# Patient Record
Sex: MALE | Race: WHITE | NOT HISPANIC OR LATINO | Employment: OTHER | URBAN - NONMETROPOLITAN AREA
[De-identification: names, ages, dates, MRNs, and addresses within clinical notes are randomized per-mention and may not be internally consistent; named-entity substitution may affect disease eponyms.]

---

## 2019-09-12 ENCOUNTER — OFFICE VISIT (OUTPATIENT)
Dept: ENDOCRINOLOGY | Facility: CLINIC | Age: 74
End: 2019-09-12

## 2019-09-12 VITALS
HEIGHT: 73 IN | DIASTOLIC BLOOD PRESSURE: 74 MMHG | OXYGEN SATURATION: 95 % | SYSTOLIC BLOOD PRESSURE: 122 MMHG | HEART RATE: 74 BPM | WEIGHT: 244.7 LBS | BODY MASS INDEX: 32.43 KG/M2

## 2019-09-12 DIAGNOSIS — N40.0 PROSTATISM: ICD-10-CM

## 2019-09-12 DIAGNOSIS — G47.33 OBSTRUCTIVE SLEEP APNEA: ICD-10-CM

## 2019-09-12 DIAGNOSIS — E29.1 PRIMARY HYPOGONADISM IN MALE: Primary | ICD-10-CM

## 2019-09-12 DIAGNOSIS — R61 SWEATING PROFUSELY: ICD-10-CM

## 2019-09-12 DIAGNOSIS — E11.9 TYPE 2 DIABETES MELLITUS WITHOUT COMPLICATION, WITHOUT LONG-TERM CURRENT USE OF INSULIN (HCC): ICD-10-CM

## 2019-09-12 DIAGNOSIS — R97.20 ELEVATED PSA: ICD-10-CM

## 2019-09-12 PROBLEM — I10 ESSENTIAL HYPERTENSION: Status: ACTIVE | Noted: 2019-09-12

## 2019-09-12 PROCEDURE — 99204 OFFICE O/P NEW MOD 45 MIN: CPT | Performed by: INTERNAL MEDICINE

## 2019-09-12 RX ORDER — SITAGLIPTIN 100 MG/1
100 TABLET, FILM COATED ORAL DAILY
Refills: 0 | COMMUNITY
Start: 2019-07-23

## 2019-09-12 RX ORDER — GLIPIZIDE 5 MG/1
5 TABLET ORAL DAILY
Refills: 1 | COMMUNITY
Start: 2019-08-06

## 2019-09-12 RX ORDER — RIZATRIPTAN BENZOATE 10 MG/1
TABLET ORAL
Refills: 6 | COMMUNITY
Start: 2019-08-22

## 2019-09-12 RX ORDER — RIVAROXABAN 20 MG/1
20 TABLET, FILM COATED ORAL DAILY
Refills: 0 | COMMUNITY
Start: 2019-06-18

## 2019-09-12 RX ORDER — LISINOPRIL 5 MG/1
5 TABLET ORAL DAILY
Refills: 0 | COMMUNITY
Start: 2019-06-17

## 2019-09-12 RX ORDER — CALCIUM CITRATE/VITAMIN D3 200MG-6.25
1 TABLET ORAL 2 TIMES DAILY
Refills: 3 | COMMUNITY
Start: 2019-08-04

## 2019-09-12 RX ORDER — TRAVOPROST 0.004 %
DROPS OPHTHALMIC (EYE)
Refills: 99 | COMMUNITY
Start: 2019-06-06

## 2019-09-12 RX ORDER — VERAPAMIL HYDROCHLORIDE 240 MG/1
240 CAPSULE, EXTENDED RELEASE ORAL DAILY
Refills: 0 | COMMUNITY
Start: 2019-06-17

## 2019-09-12 RX ORDER — MECLIZINE HYDROCHLORIDE 25 MG/1
25 TABLET ORAL 3 TIMES DAILY PRN
COMMUNITY

## 2019-09-12 RX ORDER — SERTRALINE HYDROCHLORIDE 100 MG/1
100 TABLET, FILM COATED ORAL DAILY
Refills: 0 | COMMUNITY
Start: 2019-06-17

## 2019-09-12 RX ORDER — PANTOPRAZOLE SODIUM 40 MG/1
40 TABLET, DELAYED RELEASE ORAL DAILY
Refills: 0 | COMMUNITY
Start: 2019-07-23

## 2019-09-12 NOTE — PATIENT INSTRUCTIONS
You will be getting vials of testosterone    Each vial hold 200 mg of testosterone in 1 ml     What we suggest is 0.4 ml = 80 mg of testosterone every week.    What I suggest is to draw the testosterone with a 3 ml syringe with 18 G needle out of the vial and pass then content to an insulin syringe    On the insulin syringe you would draw to the 40 unit alcon and give that subcutaneously weekly

## 2019-09-12 NOTE — PROGRESS NOTES
Alex Lobo is a 74 y.o. male who presents for  evaluation of   Chief Complaint   Patient presents with   • low testosterone     checks sugar once daily       Referring provider     Primary Care Provider    Ulisses Junior MD    74-year-old male comes for consultation.    Reason: Male hypogonadism.    Duration, documented on June 18, 2019.    Quality/quantity total testosterone appropriately obtained at 7:12 AM on June 18, 2019 was 79 ng/dL.    Timing, constant, repeat assessment by me is consistent with low testosterone     Aggravating factors, patient has no history of brain trauma/infection.    He recalls that at the time of vasectomy he had testicular swelling that took weeks to resolve     Symptoms, patient had noticed progressive fatigue even persistent upon waking along with reduced libido and erectile dysfunction and depression.    No alleviating factors.    Aggravating factors include obesity and type 2 diabetes with chronic kidney disease stage III.          Past Medical History:   Diagnosis Date   • Essential hypertension 9/12/2019   • JITENDRA (obstructive sleep apnea) 9/12/2019   • Type 2 diabetes mellitus without complication, without long-term current use of insulin (CMS/[facility]) 9/12/2019     Family History   Problem Relation Age of Onset   • Diabetes Mother      Social History     Tobacco Use   • Smoking status: Former Smoker     Types: Pipe   • Smokeless tobacco: Never Used   • Tobacco comment: very seldom previous pipe smoker   Substance Use Topics   • Alcohol use: No     Frequency: Never   • Drug use: No         Current Outpatient Medications:   •  glipiZIDE (GLUCOTROL) 5 MG tablet, Take 5 mg by mouth Daily., Disp: , Rfl: 1  •  JANUVIA 100 MG tablet, Take 100 mg by mouth Daily., Disp: , Rfl: 0  •  lisinopril (PRINIVIL,ZESTRIL) 5 MG tablet, Take 5 mg by mouth Daily., Disp: , Rfl: 0  •  meclizine (ANTIVERT) 25 MG tablet, Take 25 mg by mouth 3 (Three) Times a Day As Needed for dizziness., Disp: , Rfl:  "  •  pantoprazole (PROTONIX) 40 MG EC tablet, Take 40 mg by mouth Daily., Disp: , Rfl: 0  •  rizatriptan (MAXALT) 10 MG tablet, TAKE 1 TABLET BY MOUTH AS NEEDED FOR HEADACHE, Disp: , Rfl: 6  •  sertraline (ZOLOFT) 100 MG tablet, Take 100 mg by mouth Daily., Disp: , Rfl: 0  •  TRAVATAN Z 0.004 % solution ophthalmic solution, INSTILL 1 DROP INTO EACH EYE AT BEDTIME, Disp: , Rfl: 99  •  TRUE METRIX BLOOD GLUCOSE TEST test strip, 1 each by Other route 2 (Two) Times a Day. to check glucose, Disp: , Rfl: 3  •  verapamil ER (VERELAN) 240 MG 24 hr capsule, Take 240 mg by mouth Daily., Disp: , Rfl: 0  •  XARELTO 20 MG tablet, Take 20 mg by mouth Daily., Disp: , Rfl: 0  •  Insulin Syringe 31G X 5/16\" 0.5 ML misc, To use as indicated once weekly, Disp: 50 each, Rfl: 11  •  Needle, Disp, 18G X 1\" misc, To use weekly, Disp: 4 each, Rfl: 11  •  Testosterone Cypionate (DEPOTESTOTERONE CYPIONATE) 200 MG/ML injection, 0.4 ml weekly = 80 mg weekly, Disp: 2 mL, Rfl: 5    Review of Systems    Review of Systems   Constitutional: Positive for fatigue and unexpected weight change. Negative for activity change, appetite change, chills, diaphoresis and fever.   HENT: Negative for congestion, dental problem, drooling, ear discharge, ear pain, facial swelling, mouth sores, postnasal drip, rhinorrhea, sinus pressure, sore throat, tinnitus, trouble swallowing and voice change.    Eyes: Negative for photophobia, pain, discharge, redness, itching and visual disturbance.   Respiratory: Positive for apnea. Negative for cough, choking, chest tightness, shortness of breath, wheezing and stridor.    Cardiovascular: Negative for chest pain, palpitations and leg swelling.   Gastrointestinal: Negative for abdominal distention, abdominal pain, constipation, diarrhea, nausea and vomiting.   Endocrine: Negative for cold intolerance, heat intolerance, polydipsia, polyphagia and polyuria.   Genitourinary: Negative for decreased urine volume, difficulty " "urinating, dysuria, flank pain, frequency, hematuria and urgency.   Musculoskeletal: Positive for arthralgias and myalgias. Negative for back pain, gait problem, joint swelling, neck pain and neck stiffness.   Skin: Negative for color change, pallor, rash and wound.   Allergic/Immunologic: Negative for immunocompromised state.   Neurological: Positive for weakness. Negative for dizziness, tremors, seizures, syncope, facial asymmetry, speech difficulty, light-headedness, numbness and headaches.   Hematological: Negative for adenopathy.   Psychiatric/Behavioral: Negative for agitation, behavioral problems, confusion, decreased concentration, dysphoric mood, hallucinations, self-injury, sleep disturbance and suicidal ideas. The patient is not nervous/anxious and is not hyperactive.         Objective:   /74   Pulse 74   Ht 185.4 cm (73\")   Wt 111 kg (244 lb 11.2 oz)   SpO2 95%   BMI 32.28 kg/m²     Physical Exam   Constitutional: He is oriented to person, place, and time. He appears well-developed and well-nourished. He is cooperative.   HENT:   Head: Normocephalic and atraumatic.   Right Ear: External ear normal.   Left Ear: External ear normal.   Nose: Nose normal.   Mouth/Throat: Oropharynx is clear and moist. No oropharyngeal exudate.   Eyes: Conjunctivae and EOM are normal. Pupils are equal, round, and reactive to light. No scleral icterus. Right eye exhibits normal extraocular motion. Left eye exhibits normal extraocular motion.   Neck: Neck supple. No JVD present. No muscular tenderness present. No tracheal deviation, no edema and no erythema present. No thyromegaly present.   Cardiovascular: Normal rate, regular rhythm, normal heart sounds and intact distal pulses. Exam reveals no gallop and no friction rub.   No murmur heard.  Pulmonary/Chest: Effort normal and breath sounds normal. No stridor. No respiratory distress. He has no decreased breath sounds. He has no wheezes. He has no rhonchi. He has no " rales. He exhibits no tenderness.   Abdominal: Soft. Bowel sounds are normal. He exhibits no distension and no mass. There is no hepatomegaly. There is no tenderness. There is no rebound and no guarding. No hernia.   Genitourinary:   Genitourinary Comments: Testicular size , 10 ml bilaterally    Musculoskeletal: Normal range of motion. He exhibits no edema, tenderness or deformity.   Lymphadenopathy:     He has no cervical adenopathy.   Neurological: He is alert and oriented to person, place, and time. He has normal reflexes. No cranial nerve deficit. He exhibits normal muscle tone. Coordination normal.   Skin: Skin is warm. No rash noted. No erythema. No pallor.   Psychiatric: He has a normal mood and affect. His behavior is normal. Judgment and thought content normal.   Nursing note and vitals reviewed.      Lab Review    Results for orders placed or performed in visit on 09/12/19   Comprehensive Metabolic Panel   Result Value Ref Range    Glucose 122 (H) 65 - 99 mg/dL    BUN 19 8 - 23 mg/dL    Creatinine 1.15 0.76 - 1.27 mg/dL    Sodium 140 136 - 145 mmol/L    Potassium 5.1 3.5 - 5.2 mmol/L    Chloride 100 98 - 107 mmol/L    CO2 28.1 22.0 - 29.0 mmol/L    Calcium 9.6 8.6 - 10.5 mg/dL    Total Protein 7.6 6.0 - 8.5 g/dL    Albumin 5.00 3.50 - 5.20 g/dL    ALT (SGPT) 51 (H) 1 - 41 U/L    AST (SGOT) 42 (H) 1 - 40 U/L    Alkaline Phosphatase 77 39 - 117 U/L    Total Bilirubin 0.4 0.2 - 1.2 mg/dL    eGFR Non African Amer 62 >60 mL/min/1.73    Globulin 2.6 gm/dL    A/G Ratio 1.9 g/dL    BUN/Creatinine Ratio 16.5 7.0 - 25.0    Anion Gap 11.9 5.0 - 15.0 mmol/L   FSH & LH   Result Value Ref Range    FSH 34.10 mIU/mL    LH 23.80 mIU/mL   Iron Profile   Result Value Ref Range    Iron 106 59 - 158 mcg/dL    Iron Saturation 26 20 - 50 %    Transferrin 270 200 - 360 mg/dL    TIBC 402 298 - 536 mcg/dL   Prolactin   Result Value Ref Range    Prolactin 6.85 4.04 - 15.20 ng/mL   PSA Total, Reflex To Free   Result Value Ref Range     PSA 2.0 0.0 - 4.0 ng/mL    Reflex Comment    Sex Horm Binding Globulin   Result Value Ref Range    Sex Hormone Binding Globulin 54.0 19.3 - 76.4 nmol/L   Testosterone   Result Value Ref Range    Testosterone, Total 94.40 (L) 193.00 - 740.00 ng/dL   Insulin-like Growth Factor   Result Value Ref Range    Insulin-Like Growth Factor-1 77 41 - 179 ng/mL   ACTH   Result Value Ref Range    ACTH 21.3 7.2 - 63.3 pg/mL   Cortisol - AM   Result Value Ref Range    Cortisol - AM 8.48 mcg/dL   TSH   Result Value Ref Range    TSH 2.310 0.270 - 4.200 uIU/mL   T4, Free   Result Value Ref Range    Free T4 1.07 0.93 - 1.70 ng/dL   CBC Auto Differential   Result Value Ref Range    WBC 6.28 3.40 - 10.80 10*3/mm3    RBC 4.83 4.14 - 5.80 10*6/mm3    Hemoglobin 15.5 13.0 - 17.7 g/dL    Hematocrit 47.6 37.5 - 51.0 %    MCV 98.6 (H) 79.0 - 97.0 fL    MCH 32.1 26.6 - 33.0 pg    MCHC 32.6 31.5 - 35.7 g/dL    RDW 13.2 12.3 - 15.4 %    RDW-SD 48.6 37.0 - 54.0 fl    MPV 11.2 6.0 - 12.0 fL    Platelets 169 140 - 450 10*3/mm3    Neutrophil % 58.1 42.7 - 76.0 %    Lymphocyte % 29.0 19.6 - 45.3 %    Monocyte % 9.4 5.0 - 12.0 %    Eosinophil % 2.2 0.3 - 6.2 %    Basophil % 0.8 0.0 - 1.5 %    Immature Grans % 0.5 0.0 - 0.5 %    Neutrophils, Absolute 3.65 1.70 - 7.00 10*3/mm3    Lymphocytes, Absolute 1.82 0.70 - 3.10 10*3/mm3    Monocytes, Absolute 0.59 0.10 - 0.90 10*3/mm3    Eosinophils, Absolute 0.14 0.00 - 0.40 10*3/mm3    Basophils, Absolute 0.05 0.00 - 0.20 10*3/mm3    Immature Grans, Absolute 0.03 0.00 - 0.05 10*3/mm3    nRBC 0.0 0.0 - 0.2 /100 WBC         Assessment/Plan       ICD-10-CM ICD-9-CM   1. Primary hypogonadism in male E29.1 257.2   2. Prostatism N40.0 600.90   3. Type 2 diabetes mellitus without complication, without long-term current use of insulin (CMS/[facility]) E11.9 250.00   4. Sweating profusely R61 780.8   5. Obstructive sleep apnea G47.33 327.23         I reviewed and summarized records from Ulisses Junior MD from current year   and I reviewed / ordered labs.   From review of records :    Assessment reveals testicular hypogonadism probably due to trauma at the time of vasectomy     I calculated Free Androgen Index and it is only 6 ( normal is above 30 )      Start  0.4 mL subcutaneously weekly of the 200 mg/mL vial equaling 80 mg/week.    Patient does have obstructive sleep apnea but is not wearing his CPAP at this point.  I will refer back to Dr. Lara who saw him in the past      Orders Placed This Encounter   Procedures   • Comprehensive Metabolic Panel   • FSH & LH   • Iron Profile   • Prolactin   • PSA Total, Reflex To Free   • Sex Horm Binding Globulin   • Testosterone   • Testosterone, Bioavailable (M)   • Insulin-like Growth Factor   • ACTH   • Cortisol - AM   • TSH   • T4, Free   • Metanephrines, Frac. Free, Plasma   • CBC Auto Differential   • Ambulatory Referral to Sleep Medicine     Referral Priority:   Routine     Referral Type:   Consultation     Referral Reason:   Specialty Services Required     Requested Specialty:   Sleep Medicine     Number of Visits Requested:   1   • CBC & Differential     Order Specific Question:   Manual Differential     Answer:   No         A copy of my note was sent to Ulisses Junior MD    Please see my above opinion and suggestions.

## 2019-09-13 ENCOUNTER — APPOINTMENT (OUTPATIENT)
Dept: LAB | Facility: HOSPITAL | Age: 74
End: 2019-09-13

## 2019-09-13 LAB
ALBUMIN SERPL-MCNC: 5 G/DL (ref 3.5–5.2)
ALBUMIN/GLOB SERPL: 1.9 G/DL
ALP SERPL-CCNC: 77 U/L (ref 39–117)
ALT SERPL W P-5'-P-CCNC: 51 U/L (ref 1–41)
ANION GAP SERPL CALCULATED.3IONS-SCNC: 11.9 MMOL/L (ref 5–15)
AST SERPL-CCNC: 42 U/L (ref 1–40)
BASOPHILS # BLD AUTO: 0.05 10*3/MM3 (ref 0–0.2)
BASOPHILS NFR BLD AUTO: 0.8 % (ref 0–1.5)
BILIRUB SERPL-MCNC: 0.4 MG/DL (ref 0.2–1.2)
BUN BLD-MCNC: 19 MG/DL (ref 8–23)
BUN/CREAT SERPL: 16.5 (ref 7–25)
CALCIUM SPEC-SCNC: 9.6 MG/DL (ref 8.6–10.5)
CHLORIDE SERPL-SCNC: 100 MMOL/L (ref 98–107)
CO2 SERPL-SCNC: 28.1 MMOL/L (ref 22–29)
CORTIS AM PEAK SERPL-MCNC: 8.48 MCG/DL
CREAT BLD-MCNC: 1.15 MG/DL (ref 0.76–1.27)
DEPRECATED RDW RBC AUTO: 48.6 FL (ref 37–54)
EOSINOPHIL # BLD AUTO: 0.14 10*3/MM3 (ref 0–0.4)
EOSINOPHIL NFR BLD AUTO: 2.2 % (ref 0.3–6.2)
ERYTHROCYTE [DISTWIDTH] IN BLOOD BY AUTOMATED COUNT: 13.2 % (ref 12.3–15.4)
FSH SERPL-ACNC: 34.1 MIU/ML
GFR SERPL CREATININE-BSD FRML MDRD: 62 ML/MIN/1.73
GLOBULIN UR ELPH-MCNC: 2.6 GM/DL
GLUCOSE BLD-MCNC: 122 MG/DL (ref 65–99)
HCT VFR BLD AUTO: 47.6 % (ref 37.5–51)
HGB BLD-MCNC: 15.5 G/DL (ref 13–17.7)
IMM GRANULOCYTES # BLD AUTO: 0.03 10*3/MM3 (ref 0–0.05)
IMM GRANULOCYTES NFR BLD AUTO: 0.5 % (ref 0–0.5)
IRON 24H UR-MRATE: 106 MCG/DL (ref 59–158)
IRON SATN MFR SERPL: 26 % (ref 20–50)
LH SERPL-ACNC: 23.8 MIU/ML
LYMPHOCYTES # BLD AUTO: 1.82 10*3/MM3 (ref 0.7–3.1)
LYMPHOCYTES NFR BLD AUTO: 29 % (ref 19.6–45.3)
MCH RBC QN AUTO: 32.1 PG (ref 26.6–33)
MCHC RBC AUTO-ENTMCNC: 32.6 G/DL (ref 31.5–35.7)
MCV RBC AUTO: 98.6 FL (ref 79–97)
MONOCYTES # BLD AUTO: 0.59 10*3/MM3 (ref 0.1–0.9)
MONOCYTES NFR BLD AUTO: 9.4 % (ref 5–12)
NEUTROPHILS # BLD AUTO: 3.65 10*3/MM3 (ref 1.7–7)
NEUTROPHILS NFR BLD AUTO: 58.1 % (ref 42.7–76)
NRBC BLD AUTO-RTO: 0 /100 WBC (ref 0–0.2)
PLATELET # BLD AUTO: 169 10*3/MM3 (ref 140–450)
PMV BLD AUTO: 11.2 FL (ref 6–12)
POTASSIUM BLD-SCNC: 5.1 MMOL/L (ref 3.5–5.2)
PROLACTIN SERPL-MCNC: 6.85 NG/ML (ref 4.04–15.2)
PROT SERPL-MCNC: 7.6 G/DL (ref 6–8.5)
RBC # BLD AUTO: 4.83 10*6/MM3 (ref 4.14–5.8)
SODIUM BLD-SCNC: 140 MMOL/L (ref 136–145)
T4 FREE SERPL-MCNC: 1.07 NG/DL (ref 0.93–1.7)
TESTOST SERPL-MCNC: 94.4 NG/DL (ref 193–740)
TIBC SERPL-MCNC: 402 MCG/DL (ref 298–536)
TRANSFERRIN SERPL-MCNC: 270 MG/DL (ref 200–360)
TSH SERPL DL<=0.05 MIU/L-ACNC: 2.31 UIU/ML (ref 0.27–4.2)
WBC NRBC COR # BLD: 6.28 10*3/MM3 (ref 3.4–10.8)

## 2019-09-13 PROCEDURE — 84270 ASSAY OF SEX HORMONE GLOBUL: CPT | Performed by: INTERNAL MEDICINE

## 2019-09-13 PROCEDURE — 84410 TESTOSTERONE BIOAVAILABLE: CPT | Performed by: INTERNAL MEDICINE

## 2019-09-13 PROCEDURE — 83002 ASSAY OF GONADOTROPIN (LH): CPT | Performed by: INTERNAL MEDICINE

## 2019-09-13 PROCEDURE — 80053 COMPREHEN METABOLIC PANEL: CPT | Performed by: INTERNAL MEDICINE

## 2019-09-13 PROCEDURE — 84466 ASSAY OF TRANSFERRIN: CPT | Performed by: INTERNAL MEDICINE

## 2019-09-13 PROCEDURE — 84403 ASSAY OF TOTAL TESTOSTERONE: CPT | Performed by: INTERNAL MEDICINE

## 2019-09-13 PROCEDURE — 84146 ASSAY OF PROLACTIN: CPT | Performed by: INTERNAL MEDICINE

## 2019-09-13 PROCEDURE — 84305 ASSAY OF SOMATOMEDIN: CPT | Performed by: INTERNAL MEDICINE

## 2019-09-13 PROCEDURE — 85025 COMPLETE CBC W/AUTO DIFF WBC: CPT | Performed by: INTERNAL MEDICINE

## 2019-09-13 PROCEDURE — 84439 ASSAY OF FREE THYROXINE: CPT | Performed by: INTERNAL MEDICINE

## 2019-09-13 PROCEDURE — 84443 ASSAY THYROID STIM HORMONE: CPT | Performed by: INTERNAL MEDICINE

## 2019-09-13 PROCEDURE — 82533 TOTAL CORTISOL: CPT | Performed by: INTERNAL MEDICINE

## 2019-09-13 PROCEDURE — 83540 ASSAY OF IRON: CPT | Performed by: INTERNAL MEDICINE

## 2019-09-13 PROCEDURE — 84153 ASSAY OF PSA TOTAL: CPT | Performed by: INTERNAL MEDICINE

## 2019-09-13 PROCEDURE — 83001 ASSAY OF GONADOTROPIN (FSH): CPT | Performed by: INTERNAL MEDICINE

## 2019-09-13 PROCEDURE — 83835 ASSAY OF METANEPHRINES: CPT | Performed by: INTERNAL MEDICINE

## 2019-09-13 PROCEDURE — 82024 ASSAY OF ACTH: CPT | Performed by: INTERNAL MEDICINE

## 2019-09-14 LAB
PSA SERPL-MCNC: 2 NG/ML (ref 0–4)
REFLEX: NORMAL
SHBG SERPL-SCNC: 54 NMOL/L (ref 19.3–76.4)

## 2019-09-16 LAB
ACTH PLAS-MCNC: 21.3 PG/ML (ref 7.2–63.3)
IGF-I SERPL-MCNC: 77 NG/ML (ref 41–179)

## 2019-09-17 RX ORDER — TESTOSTERONE CYPIONATE 200 MG/ML
INJECTION, SOLUTION INTRAMUSCULAR
Qty: 2 ML | Refills: 5 | Status: SHIPPED | OUTPATIENT
Start: 2019-09-17

## 2019-09-17 RX ORDER — NAPROXEN SODIUM 220 MG
TABLET ORAL
Qty: 50 EACH | Refills: 11 | Status: SHIPPED | OUTPATIENT
Start: 2019-09-17

## 2019-09-19 LAB
BIOAVAILABLE TESTOSTERONE, %: 24.5 %
BIOAVAILABLE TESTOSTERONE, S: 32 NG/DL
TESTOST SERPL-MCNC: 129 NG/DL

## 2019-09-22 LAB
METANEPHRINE, PL: NORMAL PG/ML (ref 0–62)
NORMETANEPHRINE, PL: 12 PG/ML (ref 0–145)

## 2019-12-11 ENCOUNTER — APPOINTMENT (OUTPATIENT)
Dept: LAB | Facility: HOSPITAL | Age: 74
End: 2019-12-11

## 2019-12-11 LAB
ALBUMIN SERPL-MCNC: 4.3 G/DL (ref 3.5–5.2)
ALBUMIN/GLOB SERPL: 1.2 G/DL
ALP SERPL-CCNC: 67 U/L (ref 39–117)
ALT SERPL W P-5'-P-CCNC: 34 U/L (ref 1–41)
ANION GAP SERPL CALCULATED.3IONS-SCNC: 13.4 MMOL/L (ref 5–15)
AST SERPL-CCNC: 30 U/L (ref 1–40)
BILIRUB SERPL-MCNC: 0.5 MG/DL (ref 0.2–1.2)
BUN BLD-MCNC: 17 MG/DL (ref 8–23)
BUN/CREAT SERPL: 14.3 (ref 7–25)
CALCIUM SPEC-SCNC: 9.4 MG/DL (ref 8.6–10.5)
CHLORIDE SERPL-SCNC: 102 MMOL/L (ref 98–107)
CO2 SERPL-SCNC: 25.6 MMOL/L (ref 22–29)
CREAT BLD-MCNC: 1.19 MG/DL (ref 0.76–1.27)
GFR SERPL CREATININE-BSD FRML MDRD: 60 ML/MIN/1.73
GLOBULIN UR ELPH-MCNC: 3.7 GM/DL
GLUCOSE BLD-MCNC: 137 MG/DL (ref 65–99)
HCT VFR BLD AUTO: 46.4 % (ref 37.5–51)
POTASSIUM BLD-SCNC: 5 MMOL/L (ref 3.5–5.2)
PROT SERPL-MCNC: 8 G/DL (ref 6–8.5)
SODIUM BLD-SCNC: 141 MMOL/L (ref 136–145)
TESTOST SERPL-MCNC: 492 NG/DL (ref 193–740)

## 2019-12-11 PROCEDURE — 84154 ASSAY OF PSA FREE: CPT | Performed by: INTERNAL MEDICINE

## 2019-12-11 PROCEDURE — 84403 ASSAY OF TOTAL TESTOSTERONE: CPT | Performed by: INTERNAL MEDICINE

## 2019-12-11 PROCEDURE — 84153 ASSAY OF PSA TOTAL: CPT | Performed by: INTERNAL MEDICINE

## 2019-12-11 PROCEDURE — 85014 HEMATOCRIT: CPT | Performed by: INTERNAL MEDICINE

## 2019-12-11 PROCEDURE — 80053 COMPREHEN METABOLIC PANEL: CPT | Performed by: INTERNAL MEDICINE

## 2019-12-12 LAB
PSA FREE MFR SERPL: 18.2 %
PSA FREE SERPL-MCNC: 1 NG/ML
PSA SERPL-MCNC: 5.5 NG/ML (ref 0–4)
REFLEX: ABNORMAL

## 2019-12-12 RX ORDER — NAPROXEN SODIUM 220 MG
TABLET ORAL
Qty: 50 EACH | Refills: 11 | OUTPATIENT
Start: 2019-12-12

## 2019-12-12 RX ORDER — TESTOSTERONE CYPIONATE 200 MG/ML
INJECTION, SOLUTION INTRAMUSCULAR
Qty: 2 ML | Refills: 5 | OUTPATIENT
Start: 2019-12-12

## 2019-12-16 ENCOUNTER — TELEPHONE (OUTPATIENT)
Dept: FAMILY MEDICINE CLINIC | Facility: CLINIC | Age: 74
End: 2019-12-16

## 2019-12-16 NOTE — TELEPHONE ENCOUNTER
Pt spoke with Dr. Brooks on Friday and he understood him to say he did not need his 3 mo follow up on 12/18/2019 with [facility]. They are wanting a call back to see if they need to still come in or not. Thanks!    On 12/13/19 he told them to schedule an appointment 6 weeks from 12/13/19 so January end. Thanks  I snet a [employer] message this morning with this same info.   Thanks, just call them and change the appointment from 12/18/19 to 6 weeks from 12/13/19

## 2019-12-16 NOTE — TELEPHONE ENCOUNTER
Pt spoke with Dr. Brooks on Friday and he understood him to say he did not need his 3 mo follow up on 12/18/2019 with Ilir. They are wanting a call back to see if they need to still come in or not. Thanks!

## 2019-12-18 ENCOUNTER — OFFICE VISIT (OUTPATIENT)
Dept: ENDOCRINOLOGY | Facility: CLINIC | Age: 74
End: 2019-12-18

## 2019-12-18 VITALS
SYSTOLIC BLOOD PRESSURE: 142 MMHG | OXYGEN SATURATION: 98 % | BODY MASS INDEX: 33.11 KG/M2 | WEIGHT: 249.8 LBS | DIASTOLIC BLOOD PRESSURE: 70 MMHG | HEIGHT: 73 IN | HEART RATE: 72 BPM

## 2019-12-18 DIAGNOSIS — R97.20 ELEVATED PSA: ICD-10-CM

## 2019-12-18 DIAGNOSIS — E29.1 MALE HYPOGONADISM: Primary | ICD-10-CM

## 2019-12-18 PROCEDURE — 99214 OFFICE O/P EST MOD 30 MIN: CPT | Performed by: NURSE PRACTITIONER

## 2019-12-18 NOTE — PROGRESS NOTES
Subjective    Alex Lobo is a 74 y.o. male. he is here today for follow-up.    History of Present Illness       Primary Care Provider     Ulisses Junior MD     74-year-old male comes for follow up      Reason: Male hypogonadism.     Duration, documented on June 18, 2019.     Quality/quantity total testosterone appropriately obtained at 7:12 AM on June 18, 2019 was 79 ng/dL.     Timing, constant, repeat assessment by me is consistent with low testosterone      Aggravating factors, patient has no history of brain trauma/infection.     He recalls that at the time of vasectomy he had testicular swelling that took weeks to resolve      Symptoms,now resolved states has energy       alleviating factors.--testosterone      Aggravating factors include obesity and type 2 diabetes with chronic kidney disease stage III.    PSA elevated since starting testosterone               Component      Latest Ref Rng & Units 9/13/2019 9/13/2019           8:12 AM  8:12 AM   Testosterone, Total      193.00 - 740.00 ng/dL 94.40 (L) 129 (L)   Testosterone, Bioavailable      ng/dL  32 (L)   Testosterone, % Bioavailable      %  24.5   PSA      0.0 - 4.0 ng/mL 2.0    Reflex       Comment    Sex Hormone Binding Globulin      19.3 - 76.4 nmol/L 54.0        Component      Latest Ref Rng & Units 12/11/2019   PSA      0.0 - 4.0 ng/mL 5.5 (H)   Reflex       Comment   PSA, Free      N/A ng/mL 1.00   PSA, Free %      % 18.2   Testosterone, Total      193.00 - 740.00 ng/dL 492.00       The following portions of the patient's history were reviewed and updated as appropriate:   Past Medical History:   Diagnosis Date   • Essential hypertension 9/12/2019   • JITENDRA (obstructive sleep apnea) 9/12/2019   • Type 2 diabetes mellitus without complication, without long-term current use of insulin (CMS/[facility]) 9/12/2019     No past surgical history on file.  Family History   Problem Relation Age of Onset   • Diabetes Mother        Current Outpatient Medications  "  Medication Sig Dispense Refill   • glipiZIDE (GLUCOTROL) 5 MG tablet Take 5 mg by mouth Daily.  1   • Insulin Syringe 31G X 5/16\" 0.5 ML misc To use as indicated once weekly 50 each 11   • JANUVIA 100 MG tablet Take 100 mg by mouth Daily.  0   • lisinopril (PRINIVIL,ZESTRIL) 5 MG tablet Take 5 mg by mouth Daily.  0   • meclizine (ANTIVERT) 25 MG tablet Take 25 mg by mouth 3 (Three) Times a Day As Needed for dizziness.     • Needle, Disp, 18G X 1\" misc To use weekly 4 each 11   • pantoprazole (PROTONIX) 40 MG EC tablet Take 40 mg by mouth Daily.  0   • rizatriptan (MAXALT) 10 MG tablet TAKE 1 TABLET BY MOUTH AS NEEDED FOR HEADACHE  6   • sertraline (ZOLOFT) 100 MG tablet Take 100 mg by mouth Daily.  0   • Testosterone Cypionate (DEPOTESTOTERONE CYPIONATE) 200 MG/ML injection 0.4 ml weekly = 80 mg weekly 2 mL 5   • TRAVATAN Z 0.004 % solution ophthalmic solution INSTILL 1 DROP INTO EACH EYE AT BEDTIME  99   • TRUE METRIX BLOOD GLUCOSE TEST test strip 1 each by Other route 2 (Two) Times a Day. to check glucose  3   • verapamil ER (VERELAN) 240 MG 24 hr capsule Take 240 mg by mouth Daily.  0   • XARELTO 20 MG tablet Take 20 mg by mouth Daily.  0     No current facility-administered medications for this visit.      Allergies   Allergen Reactions   • Ticlid [Ticlopidine] Hives     swelling     Social History     Socioeconomic History   • Marital status:      Spouse name: Not on file   • Number of children: Not on file   • Years of education: Not on file   • Highest education level: Not on file   Tobacco Use   • Smoking status: Former Smoker     Types: Pipe   • Smokeless tobacco: Never Used   • Tobacco comment: very seldom previous pipe smoker   Substance and Sexual Activity   • Alcohol use: No     Frequency: Never   • Drug use: No   • Sexual activity: Defer       Review of Systems  Review of Systems   Constitutional: Negative for activity change, appetite change, diaphoresis and fatigue.   HENT: Negative for " "facial swelling, sneezing, sore throat, tinnitus, trouble swallowing and voice change.    Eyes: Negative for photophobia, pain, discharge, redness, itching and visual disturbance.   Respiratory: Negative for apnea, cough, choking, chest tightness and shortness of breath.    Cardiovascular: Negative for chest pain, palpitations and leg swelling.   Gastrointestinal: Negative for abdominal distention, abdominal pain, constipation, diarrhea, nausea and vomiting.   Endocrine: Negative for cold intolerance, heat intolerance, polydipsia, polyphagia and polyuria.   Genitourinary: Negative for difficulty urinating, dysuria, frequency, hematuria and urgency.   Musculoskeletal: Negative for arthralgias, back pain, gait problem, joint swelling, myalgias, neck pain and neck stiffness.   Skin: Negative for color change, pallor, rash and wound.   Neurological: Negative for dizziness, tremors, weakness, light-headedness, numbness and headaches.   Hematological: Negative for adenopathy. Does not bruise/bleed easily.   Psychiatric/Behavioral: Negative for behavioral problems, confusion and sleep disturbance.        Objective    /70   Pulse 72   Ht 185.4 cm (73\")   Wt 113 kg (249 lb 12.8 oz)   SpO2 98%   BMI 32.96 kg/m²   Physical Exam   Constitutional: He is oriented to person, place, and time. He appears well-developed and well-nourished. No distress.   HENT:   Head: Normocephalic and atraumatic.   Right Ear: External ear normal.   Left Ear: External ear normal.   Nose: Nose normal.   Eyes: Pupils are equal, round, and reactive to light. Conjunctivae and EOM are normal.   Neck: Normal range of motion. Neck supple. No tracheal deviation present. No thyromegaly present.   Cardiovascular: Normal rate, regular rhythm and normal heart sounds.   No murmur heard.  Pulmonary/Chest: Effort normal and breath sounds normal. No respiratory distress. He has no wheezes.   Abdominal: Soft. Bowel sounds are normal. There is no " "tenderness. There is no rebound and no guarding.   Musculoskeletal: Normal range of motion. He exhibits no edema, tenderness or deformity.   Neurological: He is alert and oriented to person, place, and time. No cranial nerve deficit.   Skin: Skin is warm and dry. No rash noted.   Psychiatric: He has a normal mood and affect. His behavior is normal. Judgment and thought content normal.       Lab Review  Glucose (mg/dL)   Date Value   12/11/2019 137 (H)   09/13/2019 122 (H)   07/02/2014 115 (H)     Sodium (mmol/L)   Date Value   12/11/2019 141   09/13/2019 140   07/02/2014 143     Potassium (mmol/L)   Date Value   12/11/2019 5.0   09/13/2019 5.1   07/02/2014 4.6     Chloride (mmol/L)   Date Value   12/11/2019 102   09/13/2019 100   07/02/2014 99     CO2 (mmol/L)   Date Value   12/11/2019 25.6   09/13/2019 28.1   07/02/2014 28     BUN (mg/dL)   Date Value   12/11/2019 17   09/13/2019 19   07/02/2014 22 (H)     Creatinine (mg/dL)   Date Value   12/11/2019 1.19   09/13/2019 1.15   07/02/2014 1.08       Assessment/Plan      1. Male hypogonadism    2. Elevated PSA    .    Medications prescribed:  Outpatient Encounter Medications as of 12/18/2019   Medication Sig Dispense Refill   • glipiZIDE (GLUCOTROL) 5 MG tablet Take 5 mg by mouth Daily.  1   • Insulin Syringe 31G X 5/16\" 0.5 ML misc To use as indicated once weekly 50 each 11   • JANUVIA 100 MG tablet Take 100 mg by mouth Daily.  0   • lisinopril (PRINIVIL,ZESTRIL) 5 MG tablet Take 5 mg by mouth Daily.  0   • meclizine (ANTIVERT) 25 MG tablet Take 25 mg by mouth 3 (Three) Times a Day As Needed for dizziness.     • Needle, Disp, 18G X 1\" misc To use weekly 4 each 11   • pantoprazole (PROTONIX) 40 MG EC tablet Take 40 mg by mouth Daily.  0   • rizatriptan (MAXALT) 10 MG tablet TAKE 1 TABLET BY MOUTH AS NEEDED FOR HEADACHE  6   • sertraline (ZOLOFT) 100 MG tablet Take 100 mg by mouth Daily.  0   • Testosterone Cypionate (DEPOTESTOTERONE CYPIONATE) 200 MG/ML injection 0.4 " ml weekly = 80 mg weekly 2 mL 5   • TRAVATAN Z 0.004 % solution ophthalmic solution INSTILL 1 DROP INTO EACH EYE AT BEDTIME  99   • TRUE METRIX BLOOD GLUCOSE TEST test strip 1 each by Other route 2 (Two) Times a Day. to check glucose  3   • verapamil ER (VERELAN) 240 MG 24 hr capsule Take 240 mg by mouth Daily.  0   • XARELTO 20 MG tablet Take 20 mg by mouth Daily.  0     No facility-administered encounter medications on file as of 12/18/2019.        Orders placed during this encounter include:  No orders of the defined types were placed in this encounter.       Assessment reveals testicular hypogonadism probably due to trauma at the time of vasectomy      I calculated Free Androgen Index and it is only 6 ( normal is above 30 )        Start  0.4 mL subcutaneously weekly of the 200 mg/mL vial equaling 80 mg/week.     Patient does have obstructive sleep apnea but is not wearing his CPAP at this point.  I will refer back to Dr. Lara who saw him in the past     Component      Latest Ref Rng & Units 9/13/2019 9/13/2019           8:12 AM  8:12 AM   Testosterone, Total      193.00 - 740.00 ng/dL 94.40 (L) 129 (L)   Testosterone, Bioavailable      ng/dL  32 (L)   Testosterone, % Bioavailable      %  24.5   PSA      0.0 - 4.0 ng/mL 2.0    Reflex       Comment    Sex Hormone Binding Globulin      19.3 - 76.4 nmol/L 54.0                -----------------------------------------------------------------------------------------        Component      Latest Ref Rng & Units 12/11/2019   PSA      0.0 - 4.0 ng/mL 5.5 (H)   Reflex       Comment   Testosterone, Total      193.00 - 740.00 ng/dL 492.00         Patient has an appointment with urology on Friday 12-       with Dr.Franke in [place]       Stop the testosterone until after seeing urologist     4. Follow-up: Return in about 6 weeks (around 1/29/2020) for Recheck.

## 2019-12-20 ENCOUNTER — OFFICE VISIT (OUTPATIENT)
Dept: UROLOGY | Age: 74
End: 2019-12-20
Payer: MEDICARE

## 2019-12-20 VITALS
HEART RATE: 75 BPM | WEIGHT: 249 LBS | HEIGHT: 73 IN | DIASTOLIC BLOOD PRESSURE: 65 MMHG | BODY MASS INDEX: 33 KG/M2 | SYSTOLIC BLOOD PRESSURE: 136 MMHG | TEMPERATURE: 97 F

## 2019-12-20 DIAGNOSIS — R39.89 ABNORMAL PROSTATE EXAM: ICD-10-CM

## 2019-12-20 DIAGNOSIS — R97.20 ELEVATED PSA: Primary | ICD-10-CM

## 2019-12-20 LAB
BACTERIA URINE, POC: NORMAL
BILIRUBIN URINE: 0 MG/DL
BLOOD, URINE: POSITIVE
CASTS URINE, POC: NORMAL
CLARITY: CLEAR
COLOR: YELLOW
CRYSTALS URINE, POC: NORMAL
EPI CELLS URINE, POC: NORMAL
GLUCOSE URINE: NORMAL
KETONES, URINE: NEGATIVE
LEUKOCYTE EST, POC: NORMAL
NITRITE, URINE: NEGATIVE
PH UA: 5.5 (ref 4.5–8)
PROTEIN UA: NEGATIVE
RBC URINE, POC: NORMAL
SPECIFIC GRAVITY UA: 1.03 (ref 1–1.03)
UROBILINOGEN, URINE: NORMAL
WBC URINE, POC: NORMAL
YEAST URINE, POC: NORMAL

## 2019-12-20 PROCEDURE — 3017F COLORECTAL CA SCREEN DOC REV: CPT | Performed by: NURSE PRACTITIONER

## 2019-12-20 PROCEDURE — G8484 FLU IMMUNIZE NO ADMIN: HCPCS | Performed by: NURSE PRACTITIONER

## 2019-12-20 PROCEDURE — G8427 DOCREV CUR MEDS BY ELIG CLIN: HCPCS | Performed by: NURSE PRACTITIONER

## 2019-12-20 PROCEDURE — 1036F TOBACCO NON-USER: CPT | Performed by: NURSE PRACTITIONER

## 2019-12-20 PROCEDURE — 81001 URINALYSIS AUTO W/SCOPE: CPT | Performed by: NURSE PRACTITIONER

## 2019-12-20 PROCEDURE — 4040F PNEUMOC VAC/ADMIN/RCVD: CPT | Performed by: NURSE PRACTITIONER

## 2019-12-20 PROCEDURE — 1123F ACP DISCUSS/DSCN MKR DOCD: CPT | Performed by: NURSE PRACTITIONER

## 2019-12-20 PROCEDURE — 99204 OFFICE O/P NEW MOD 45 MIN: CPT | Performed by: NURSE PRACTITIONER

## 2019-12-20 PROCEDURE — G8417 CALC BMI ABV UP PARAM F/U: HCPCS | Performed by: NURSE PRACTITIONER

## 2019-12-20 RX ORDER — BENZONATATE 100 MG/1
100 CAPSULE ORAL 3 TIMES DAILY PRN
COMMUNITY
End: 2020-02-07

## 2019-12-20 RX ORDER — GLIPIZIDE 5 MG/1
5 TABLET ORAL DAILY
COMMUNITY
Start: 2019-08-06

## 2019-12-20 RX ORDER — MECLIZINE HYDROCHLORIDE 25 MG/1
25 TABLET ORAL 3 TIMES DAILY PRN
COMMUNITY

## 2019-12-20 RX ORDER — LISINOPRIL 5 MG/1
TABLET ORAL DAILY
COMMUNITY
Start: 2019-06-17 | End: 2022-05-12 | Stop reason: ALTCHOICE

## 2019-12-20 RX ORDER — VERAPAMIL HYDROCHLORIDE 240 MG/1
CAPSULE, EXTENDED RELEASE ORAL DAILY
COMMUNITY
Start: 2019-06-17 | End: 2022-06-24

## 2019-12-20 RX ORDER — RIZATRIPTAN BENZOATE 10 MG/1
TABLET ORAL PRN
COMMUNITY
Start: 2019-08-22

## 2019-12-20 RX ORDER — DUTASTERIDE 0.5 MG/1
0.5 CAPSULE, LIQUID FILLED ORAL DAILY
Qty: 30 CAPSULE | Refills: 3 | Status: SHIPPED | OUTPATIENT
Start: 2019-12-20 | End: 2021-03-17

## 2019-12-20 RX ORDER — SERTRALINE HYDROCHLORIDE 100 MG/1
TABLET, FILM COATED ORAL DAILY
COMMUNITY
Start: 2019-06-17

## 2019-12-20 RX ORDER — PANTOPRAZOLE SODIUM 40 MG/1
40 GRANULE, DELAYED RELEASE ORAL
COMMUNITY
End: 2020-09-11

## 2019-12-20 RX ORDER — TESTOSTERONE CYPIONATE 200 MG/ML
INJECTION INTRAMUSCULAR
COMMUNITY
Start: 2019-09-17 | End: 2020-04-13

## 2019-12-20 RX ORDER — CIPROFLOXACIN 500 MG/1
500 TABLET, FILM COATED ORAL 2 TIMES DAILY
Qty: 8 TABLET | Refills: 0 | Status: SHIPPED | OUTPATIENT
Start: 2020-01-19 | End: 2020-01-23

## 2019-12-20 RX ORDER — CETIRIZINE HYDROCHLORIDE 10 MG/1
TABLET ORAL
COMMUNITY

## 2019-12-27 ENCOUNTER — TELEPHONE (OUTPATIENT)
Dept: FAMILY MEDICINE CLINIC | Facility: CLINIC | Age: 74
End: 2019-12-27

## 2020-01-20 ENCOUNTER — PROCEDURE VISIT (OUTPATIENT)
Dept: UROLOGY | Age: 75
End: 2020-01-20
Payer: MEDICARE

## 2020-01-20 VITALS — BODY MASS INDEX: 32.85 KG/M2 | WEIGHT: 249 LBS | TEMPERATURE: 97.3 F

## 2020-01-20 PROCEDURE — 96372 THER/PROPH/DIAG INJ SC/IM: CPT | Performed by: UROLOGY

## 2020-01-20 PROCEDURE — 76872 US TRANSRECTAL: CPT | Performed by: UROLOGY

## 2020-01-20 PROCEDURE — 55700 PR BIOPSY OF PROSTATE,NEEDLE/PUNCH: CPT | Performed by: UROLOGY

## 2020-01-20 RX ORDER — GENTAMICIN SULFATE 40 MG/ML
80 INJECTION, SOLUTION INTRAMUSCULAR; INTRAVENOUS ONCE
Status: COMPLETED | OUTPATIENT
Start: 2020-01-20 | End: 2020-01-20

## 2020-01-20 RX ADMIN — GENTAMICIN SULFATE 80 MG: 40 INJECTION, SOLUTION INTRAMUSCULAR; INTRAVENOUS at 08:04

## 2020-01-20 NOTE — LETTER
[id number] [facility]  [place] [address]  [address] [place] [id number]  Phone: [phone]  Fax: [phone]    Chelsi Iraheta MD        January 20, 2020     Daniel Sanchez  [facility] [address] [address] [facility]      Patient: Ramu Marsh   MR Number: [id number]   YOB: 1945   Date of Visit: 1/20/2020       Dear Provider: Thank you for referring Leonor Lauren to me for evaluation. Below are the relevant portions of my assessment and plan of care. If you have questions, please do not hesitate to call me. I look forward to following Brenden Carroll along with you.     Sincerely,        Chelsi Iraheta MD

## 2020-01-24 ENCOUNTER — TELEPHONE (OUTPATIENT)
Dept: UROLOGY | Age: 75
End: 2020-01-24

## 2020-01-29 ENCOUNTER — HOSPITAL ENCOUNTER (OUTPATIENT)
Dept: NUCLEAR MEDICINE | Age: 75
Discharge: HOME | End: 2020-01-31
Payer: MEDICARE

## 2020-01-29 ENCOUNTER — HOSPITAL ENCOUNTER (OUTPATIENT)
Dept: GENERAL RADIOLOGY | Age: 75
Discharge: HOME | End: 2020-01-29
Payer: MEDICARE

## 2020-01-29 ENCOUNTER — TELEPHONE (OUTPATIENT)
Dept: UROLOGY | Age: 75
End: 2020-01-29

## 2020-01-29 ENCOUNTER — HOSPITAL ENCOUNTER (OUTPATIENT)
Dept: CT IMAGING | Age: 75
Discharge: HOME | End: 2020-01-29
Payer: MEDICARE

## 2020-01-29 ENCOUNTER — HOSPITAL ENCOUNTER (OUTPATIENT)
Dept: LAB | Age: 75
Discharge: HOME | End: 2020-01-29
Payer: MEDICARE

## 2020-01-29 DIAGNOSIS — C61 PROSTATE CANCER (HCC): ICD-10-CM

## 2020-01-29 LAB
ALBUMIN SERPL-MCNC: 4.4 G/DL (ref 3.5–5.2)
ALP BLD-CCNC: 74 U/L (ref 40–130)
ALT SERPL-CCNC: 25 U/L (ref 5–41)
ANION GAP SERPL CALCULATED.3IONS-SCNC: 12 MMOL/L (ref 7–19)
AST SERPL-CCNC: 25 U/L (ref 5–40)
BILIRUB SERPL-MCNC: 0.3 MG/DL (ref 0.2–1.2)
BUN BLDV-MCNC: 19 MG/DL (ref 8–23)
CALCIUM SERPL-MCNC: 9.1 MG/DL (ref 8.8–10.2)
CHLORIDE BLD-SCNC: 103 MMOL/L (ref 98–111)
CO2: 27 MMOL/L (ref 22–29)
CREAT SERPL-MCNC: 1.1 MG/DL (ref 0.5–1.2)
GFR NON-AFRICAN AMERICAN: 50
GFR NON-AFRICAN AMERICAN: >60
GLUCOSE BLD-MCNC: 117 MG/DL (ref 74–109)
PERFORMED ON: ABNORMAL
POC CREATININE: 1.4 MG/DL (ref 0.3–1.3)
POC SAMPLE TYPE: ABNORMAL
POTASSIUM SERPL-SCNC: 4.9 MMOL/L (ref 3.5–5)
SODIUM BLD-SCNC: 142 MMOL/L (ref 136–145)
TOTAL PROTEIN: 7.8 G/DL (ref 6.6–8.7)

## 2020-01-29 PROCEDURE — A9561 TC99M OXIDRONATE: HCPCS | Performed by: UROLOGY

## 2020-01-29 PROCEDURE — 6360000004 HC RX CONTRAST MEDICATION: Performed by: UROLOGY

## 2020-01-29 PROCEDURE — 71046 X-RAY EXAM CHEST 2 VIEWS: CPT

## 2020-01-29 PROCEDURE — 3430000000 HC RX DIAGNOSTIC RADIOPHARMACEUTICAL: Performed by: UROLOGY

## 2020-01-29 PROCEDURE — 74177 CT ABD & PELVIS W/CONTRAST: CPT

## 2020-01-29 PROCEDURE — 78306 BONE IMAGING WHOLE BODY: CPT

## 2020-01-29 PROCEDURE — 3430000000 HC RX DIAGNOSTIC RADIOPHARMACEUTICAL

## 2020-01-29 PROCEDURE — A9561 TC99M OXIDRONATE: HCPCS

## 2020-01-29 PROCEDURE — 82565 ASSAY OF CREATININE: CPT

## 2020-01-29 RX ADMIN — IOPAMIDOL 90 ML: 755 INJECTION, SOLUTION INTRAVENOUS at 09:29

## 2020-01-29 RX ADMIN — TECHNETIUM TC 99M OXIDRONATE 19.8 MILLICURIE: 3.15 INJECTION, POWDER, LYOPHILIZED, FOR SOLUTION INTRAVENOUS at 12:43

## 2020-01-29 NOTE — TELEPHONE ENCOUNTER
Yessica Rodriguez requests that office return their call. The best time to reach him is Anytime. Patient has a cancer consult that he is wanting to get a different time scheduled.

## 2020-01-31 NOTE — TELEPHONE ENCOUNTER
I spoke with patient and let them know that Fridays were the only day that he did cancer consultations, he stated that he could make his scheduled day work.

## 2020-02-07 ENCOUNTER — OFFICE VISIT (OUTPATIENT)
Dept: UROLOGY | Age: 75
End: 2020-02-07
Payer: MEDICARE

## 2020-02-07 VITALS — TEMPERATURE: 97.5 F | HEIGHT: 73 IN | BODY MASS INDEX: 31.81 KG/M2 | WEIGHT: 240 LBS

## 2020-02-07 DIAGNOSIS — R59.0 RETROPERITONEAL LYMPHADENOPATHY: ICD-10-CM

## 2020-02-07 LAB — LACTATE DEHYDROGENASE: 200 U/L (ref 91–215)

## 2020-02-07 PROCEDURE — 4040F PNEUMOC VAC/ADMIN/RCVD: CPT | Performed by: UROLOGY

## 2020-02-07 PROCEDURE — 99215 OFFICE O/P EST HI 40 MIN: CPT | Performed by: UROLOGY

## 2020-02-07 PROCEDURE — 1123F ACP DISCUSS/DSCN MKR DOCD: CPT | Performed by: UROLOGY

## 2020-02-07 PROCEDURE — G8484 FLU IMMUNIZE NO ADMIN: HCPCS | Performed by: UROLOGY

## 2020-02-07 PROCEDURE — G8427 DOCREV CUR MEDS BY ELIG CLIN: HCPCS | Performed by: UROLOGY

## 2020-02-07 PROCEDURE — 3017F COLORECTAL CA SCREEN DOC REV: CPT | Performed by: UROLOGY

## 2020-02-07 PROCEDURE — G8417 CALC BMI ABV UP PARAM F/U: HCPCS | Performed by: UROLOGY

## 2020-02-07 PROCEDURE — 1036F TOBACCO NON-USER: CPT | Performed by: UROLOGY

## 2020-02-07 ASSESSMENT — ENCOUNTER SYMPTOMS
WHEEZING: 0
CONSTIPATION: 0
EYE REDNESS: 0
ABDOMINAL PAIN: 0
DIARRHEA: 0
COUGH: 0
BACK PAIN: 0
EYE DISCHARGE: 0
SHORTNESS OF BREATH: 0

## 2020-02-07 NOTE — PROGRESS NOTES
Jaziel Wetzel is a 69 y.o. male who presents today   Chief Complaint   Patient presents with    Prostate Cancer     I am here for my prostate cancer consult            Prostate Cancer:  Patient is here today for prostate cancer which was first diagnosed on 1/20/2020. His last several PSA values are as follows:  Lab Results   Component Value Date    PSA 0.73 06/22/2014     His prostate volume was 32.7 grams. PSAD 0.17  He had a prostate biopsy consisting of a total of 12 cores with 4 positive cores. All on the left side  TRUS Findings consisted of hypoechoic area noted in the left mid gland to the apex and this was heterogeneous involving the transition and peripheral zone  He has left sided disease with a Argyle score of 3+4 = 7. Location of the the Cancer: Left lateral mid, left lateral apex, left base, left apex  His clinical TNM stage was: T2b  His pathological TNM stage was: T2b  The patient has a staging CT and bone scan. Previous treatment of prostate cancer: none  Patient has Normal erectile function no            Prostate Cancer Treatment Options  I have discussed in great detail with the patient the natural history, diagnosis and treatment of prostate cancer. I explained the [facility] grading system, Staging system, correlation of disease with PSA levels, and  as well as the various treatments available for prostate cancer. I discussed the following options:  1. Watchful waiting / Active surveillance. I told that this approach was appropriate for older patients, patients with a life expectancy of 10 years or less and patients with low grade, low volume disease. This approach will require serial HERNANDO's, PSA's and possibly repeat prostate biopsy to check for grade or stage progression. 2.  Hormonal Therapy. I discussed the role of hormonal therapy in the form of LHRH agonists or antagonists such as Lupron, etc. Or surgical castration in the form of bilateral orchiectomy.   The patient was told therapy, and in most cases this is done after failure of radiation therapy and would require referral to an outside facility who does Cryrotherapy. Past Medical History:   Diagnosis Date    Diabetes mellitus ([address]     Elevated PSA     Hypertension     Kidney stone        Past Surgical History:   Procedure Laterality Date    APPENDECTOMY      JOINT REPLACEMENT  2014    SHOULDER SURGERY  2016    VASECTOMY  1979       Current Outpatient Medications   Medication Sig Dispense Refill    verapamil (VERELAN) 240 MG extended release capsule verapamil  mg 24 hr capsule,extended release      rivaroxaban (XARELTO) 20 MG TABS tablet Take 20 mg by mouth      glipiZIDE (GLUCOTROL) 5 MG tablet Take 5 mg by mouth      meclizine (ANTIVERT) 25 MG tablet Take 25 mg by mouth      pantoprazole sodium (PROTONIX) 40 MG PACK packet Take 40 mg by mouth every morning (before breakfast)      SITagliptin (JANUVIA) 100 MG tablet Take 100 mg by mouth      sertraline (ZOLOFT) 100 MG tablet sertraline 100 mg tablet      lisinopril (PRINIVIL;ZESTRIL) 5 MG tablet lisinopril 5 mg tablet      cetirizine (ZYRTEC ALLERGY) 10 MG tablet Zyrtec 10 mg tablet   Take 1 tablet every day by oral route.  rizatriptan (MAXALT) 10 MG tablet rizatriptan 10 mg tablet      testosterone cypionate (DEPOTESTOTERONE CYPIONATE) 200 MG/ML injection 0.4 ml weekly = 80 mg weekly      dutasteride (AVODART) 0.5 MG capsule Take 1 capsule by mouth daily (Patient not taking: Reported on 2/7/2020) 30 capsule 3     No current facility-administered medications for this visit.         Allergies   Allergen Reactions    Ticlopidine Hives     swelling         Social History     Socioeconomic History    Marital status:      Spouse name: None    Number of children: None    Years of education: None    Highest education level: None   Occupational History    None   Social Needs    Financial resource strain: None    Food insecurity:     Worry: None     Inability: None    Transportation needs:     Medical: None     Non-medical: None   Tobacco Use    Smoking status: Never Smoker    Smokeless tobacco: Never Used   Substance and Sexual Activity    Alcohol use: None    Drug use: None    Sexual activity: None   Lifestyle    Physical activity:     Days per week: None     Minutes per session: None    Stress: None   Relationships    Social connections:     Talks on phone: None     Gets together: None     Attends Ep[employer] service: None     Active member of club or organization: None     Attends meetings of clubs or organizations: None     Relationship status: None    Intimate partner violence:     Fear of current or ex partner: None     Emotionally abused: None     Physically abused: None     Forced sexual activity: None   Other Topics Concern    None   Social History Narrative    None       Family History   Problem Relation Age of Onset    Heart Attack Father     Kidney Disease Mother     Cancer Sister        REVIEW OF SYSTEMS:  Review of Systems   Constitutional: Negative for chills and fever. HENT: Negative for congestion and hearing loss. Eyes: Negative for discharge and redness. Respiratory: Negative for cough, shortness of breath and wheezing. Cardiovascular: Negative for leg swelling. Gastrointestinal: Negative for abdominal pain, constipation and diarrhea. Endocrine: Negative for cold intolerance and heat intolerance. Genitourinary: Negative for decreased urine volume, difficulty urinating, dysuria, enuresis, flank pain, frequency, genital sores, hematuria and urgency. Musculoskeletal: Negative for back pain and gait problem. Skin: Negative for rash. Allergic/Immunologic: Negative for environmental allergies. Neurological: Negative for dizziness, weakness and headaches. Hematological: Does not bruise/bleed easily. Psychiatric/Behavioral: Negative for behavioral problems, confusion and sleep disturbance. PHYSICAL EXAM:  Temp 97.5 °F (36.4 °C) (Temporal)   Ht 6' 1\" (1.854 m)   Wt 240 lb (108.9 kg)   BMI 31.66 kg/m²   Physical Exam  Constitutional:       General: He is not in acute distress. Appearance: Normal appearance. He is well-developed. HENT:      Head: Normocephalic and atraumatic. Nose: Nose normal.   Eyes:      General: No scleral icterus. Conjunctiva/sclera: Conjunctivae normal.      Pupils: Pupils are equal, round, and reactive to light. Neck:      Musculoskeletal: Normal range of motion and neck supple. Trachea: No tracheal deviation. Cardiovascular:      Rate and Rhythm: Normal rate and regular rhythm. Pulmonary:      Effort: Pulmonary effort is normal. No respiratory distress. Breath sounds: No stridor. Abdominal:      General: There is no distension. Palpations: Abdomen is soft. There is no mass. Tenderness: There is no abdominal tenderness. Musculoskeletal: Normal range of motion. General: No tenderness. Lymphadenopathy:      Cervical: No cervical adenopathy. Skin:     General: Skin is warm and dry. Findings: No erythema. Neurological:      Mental Status: He is alert and oriented to person, place, and time. Psychiatric:         Behavior: Behavior normal.         Judgment: Judgment normal.             DATA:  CMP:    Lab Results   Component Value Date     01/29/2020    K 4.9 01/29/2020     01/29/2020    CO2 27 01/29/2020    BUN 19 01/29/2020    CREATININE 1.4 01/29/2020    CREATININE 1.1 01/29/2020    LABGLOM 50 01/29/2020    GLUCOSE 117 01/29/2020    PROT 7.8 01/29/2020    LABALBU 4.4 01/29/2020    CALCIUM 9.1 01/29/2020    BILITOT 0.3 01/29/2020    ALKPHOS 74 01/29/2020    AST 25 01/29/2020    ALT 25 01/29/2020     No results found for this visit on 02/07/20.   Lab Results   Component Value Date    PSA 0.73 06/22/2014     PSA from outside lab done 12/11/2019 was 5.5 ng/ML      Hospital                                       [address] [place] [address]  Department of Pathology  FINAL SURGICAL PATHOLOGY REPORT  Patient Name: Erin Vargas         Accession No:  [id number]   Age Sex:   1945    74 Y M        Pt Type: R     KMURO                                             Location:  Account #     [de-identified]                 Collected:     2020  Med Rec #      [id number]                    Received:        Attend Phys:   CHERRY RODAS             Completed:     2020  Perform Phys: Waqas Carnes    FINAL DIAGNOSIS:    A: Prostate, right lateral base, needle biopsy:  Benign prostatic glands and stroma with atrophy    B: Prostate, right lateral mid, needle biopsy:  Benign prostatic glands and stroma with atrophy    C: Prostate, right lateral apex, needle biopsy:  Benign prostatic glands and stroma with mild chronic inflammation    D: Prostate, right base, needle biopsy:  Benign prostatic glands and stroma with atrophy    E: Prostate, right mid, needle biopsy:  Benign prostatic glands and stroma with mild chronic inflammation  Atrophy and corpora amylacea    F: Prostate, right apex, needle biopsy:  Benign prostatic glands and stroma with atrophy    G: Prostate, left lateral base, needle biopsy:  Benign prostatic glands and stroma with atrophy    H: Prostate, left lateral mid, needle biopsy:  Prostatic adenocarcinoma, Blue Eye score 7 (3+4)  Grade group 2  Tumor encompasses 30% of the needle core biopsy    I: Prostate, left lateral apex, needle biopsy:  Prostatic adenocarcinoma, Blue Eye's score 7 (3+4)  Grade group 2  Tumor encompasses 99% of the needle core biopsy    J: Prostate, left base, needle biopsy:  Prostatic adenocarcinoma, Blue Eye's 7 (3+4)  Grade group 2  Tumor encompasses 5% of the needle core biopsy    K: Prostate, left mid, needle biopsy:  Benign prostatic glands and stroma with mild chronic inflammation    L: Prostate, left some time discussing management options to include surgery versus radiation versus active surveillance. Once we get the results of medical oncology consultation and MRI results final recommendations will be decided at his next visit. - MRI THORACIC SPINE W WO CONTRAST; Future  - CT Chest W Contrast; Future    2. Retroperitoneal lymphadenopathy  Referral to medical oncology Dr. Tommie Alvarez  - Lactate Dehydrogenase; Future  - Beta 2 Microglobulin, Serum; Future      Orders Placed This Encounter   Procedures    MRI THORACIC SPINE W WO CONTRAST     Standing Status:   Future     Number of Occurrences:   1     Standing Expiration Date:   2/7/2021     Order Specific Question:   Reason for exam:     Answer:   Newly diagnosed prostate cancer. Bone scan with uptake mid thoracic spine and posterior right seventh rib    CT Chest W Contrast     Standing Status:   Future     Number of Occurrences:   1     Standing Expiration Date:   2/7/2021     Order Specific Question:   Reason for exam:     Answer:   Patient with prostate cancer also CT of the abdomen with retroperitoneal and mesenteric adenopathy.  Lactate Dehydrogenase     Standing Status:   Future     Number of Occurrences:   1     Standing Expiration Date:   2/7/2021    Beta 2 Microglobulin, Serum     Standing Status:   Future     Number of Occurrences:   1     Standing Expiration Date:   2/7/2021        Return in about 1 month (around 3/7/2020) for office visit after xray study. All information inputted into the note by the MA to include chief complaint, past medical history, past surgical history, medications, allergies, social and family history and review of systems has been reviewed and updated as needed by me. EMR Dragon/transcription disclaimer: Much of this documentt is electronic  transcription/translation of spoken language to printed text.  The  electronic translation of spoken language may be erroneous, or at times,  nonsensical words or phrases may be inadvertently transcribed.  Although I  have reviewed the document for such errors, some may still exist.

## 2020-02-09 LAB — BETA-2 MICROGLOBULIN: 3.3 MG/L (ref 1.1–2.4)

## 2020-02-11 ENCOUNTER — TELEPHONE (OUTPATIENT)
Dept: UROLOGY | Age: 75
End: 2020-02-11

## 2020-02-13 ENCOUNTER — HOSPITAL ENCOUNTER (OUTPATIENT)
Dept: MRI IMAGING | Age: 75
Discharge: HOME | End: 2020-02-13
Payer: MEDICARE

## 2020-02-13 ENCOUNTER — HOSPITAL ENCOUNTER (OUTPATIENT)
Dept: CT IMAGING | Age: 75
Discharge: HOME | End: 2020-02-13
Payer: MEDICARE

## 2020-02-13 PROCEDURE — A9577 INJ MULTIHANCE: HCPCS | Performed by: UROLOGY

## 2020-02-13 PROCEDURE — 6360000004 HC RX CONTRAST MEDICATION: Performed by: UROLOGY

## 2020-02-13 PROCEDURE — 71260 CT THORAX DX C+: CPT

## 2020-02-13 PROCEDURE — 72157 MRI CHEST SPINE W/O & W/DYE: CPT

## 2020-02-13 RX ADMIN — GADOBENATE DIMEGLUMINE 20 ML: 529 INJECTION, SOLUTION INTRAVENOUS at 15:46

## 2020-02-13 RX ADMIN — IOPAMIDOL 90 ML: 755 INJECTION, SOLUTION INTRAVENOUS at 14:32

## 2020-02-20 NOTE — PROGRESS NOTES
Linda Bennie   [birth year]  2/21/2020     Chief Complaint   Patient presents with    Prostate Cancer        INTERVAL HISTORY/HISTORY OF PRESENT ILLNESS:    Diagnosis  · Prostate cancer, February 2020  · [id number]  · [facility] 3+4=7  · 4 of 12 cores positive (left prostatic lobe)  · PSA @ diagnosis- 5.5  · Retroperitoneal adenopathy of unknown etiology    Mr. Jenny Boo was first seen by me on 2/21/2020 referred by Dr. Priscilla Calixto for further evaluation and recommendations regarding findings of retroperitoneal mass/adenopathy. The patient has recently been diagnosed with prostate cancer with 4 out of 12 positive cores from the left prostatic lobe. PSA at diagnosis 5.5. Pathology showed prostatic adenocarcinoma grade group 2 in the Duke score 3+4 = 7. Clinical T2b. Imaging studies with CT chest abdomen pelvis showed evidence of retroperitoneal mass. · 1/29/2020- CT of the abdomen with contrast showed scattered borderline sized mesenteric lymph nodes. Indeterminate 3 cm retroperitoneal soft tissue nodule within the upper left abdomen adjacent to the left renal vein. The mass measures 34 x 30 x 22 mm. · 1/29/2020- bone scan showed focal increased activity in the mid thoracic spine in the right seventh posterior medial rib which may represent chronic degenerative changes/large osteophytes. No other areas suggesting bony metastatic disease. · 2/13/2020- MRI thoracic spine showed no evidence of metastatic disease identified in the thoracic spine. In particular, there are no enhancing and/or infiltrative lesions identified in the thoracic spine or posterior ribs to correspond with the radiotracer activities from recent bone scan. It is possible that the bone scan findings are secondary to vertebral\" the vertebral degenerative change. · 2/13/2020- CT chest showed no evidence of metastatic disease in the chest.  Nodule in the retroperitoneum of the upper abdomen presumably a lymph node.   Additional borderline lymph nodes seen within the central mesenteric root. · 2/21/2020- we discussed the findings of the images above. Recommend a PET scan to rule out metastatic disease.     PAST MEDICAL HISTORY:   Past Medical History:   Diagnosis Date    Diabetes mellitus ([facility] [address]     Elevated PSA     Hypertension     Kidney stone     Prostate cancer ([facility]      PAST SURGICAL HISTORY:  Past Surgical History:   Procedure Laterality Date    APPENDECTOMY      JOINT REPLACEMENT  2014    SHOULDER SURGERY  2016    VASECTOMY  1979        SOCIAL HISTORY:  Social History     Socioeconomic History    Marital status:      Spouse name: Not on file    Number of children: Not on file    Years of education: Not on file    Highest education level: Not on file   Occupational History    Not on file   Social Needs    Financial resource strain: Not on file    Food insecurity:     Worry: Not on file     Inability: Not on file    Transportation needs:     Medical: Not on file     Non-medical: Not on file   Tobacco Use    Smoking status: Never Smoker    Smokeless tobacco: Never Used   Substance and Sexual Activity    Alcohol use: Not on file    Drug use: Not on file    Sexual activity: Not on file   Lifestyle    Physical activity:     Days per week: Not on file     Minutes per session: Not on file    Stress: Not on file   Relationships    Social connections:     Talks on phone: Not on file     Gets together: Not on file     Attends Jainism service: Not on file     Active member of club or organization: Not on file     Attends meetings of clubs or organizations: Not on file     Relationship status: Not on file    Intimate partner violence:     Fear of current or ex partner: Not on file     Emotionally abused: Not on file     Physically abused: Not on file     Forced sexual activity: Not on file   Other Topics Concern    Not on file   Social History Narrative    Not on file        FAMILY HISTORY:  Family History Problem Relation Age of Onset    Heart Attack Father 72        cause of death    Kidney Disease Mother     Other Mother         renal failure    Cancer Sister [de-identified]        Colon CA- cause of death     Heart Attack Sister     Colon Cancer Sister 68        Current Outpatient Medications   Medication Sig Dispense Refill    verapamil (VERELAN) 240 MG extended release capsule verapamil  mg 24 hr capsule,extended release      rivaroxaban (XARELTO) 20 MG TABS tablet Take 20 mg by mouth      glipiZIDE (GLUCOTROL) 5 MG tablet Take 5 mg by mouth      meclizine (ANTIVERT) 25 MG tablet Take 25 mg by mouth      pantoprazole sodium (PROTONIX) 40 MG PACK packet Take 40 mg by mouth every morning (before breakfast)      SITagliptin (JANUVIA) 100 MG tablet Take 100 mg by mouth      sertraline (ZOLOFT) 100 MG tablet sertraline 100 mg tablet      lisinopril (PRINIVIL;ZESTRIL) 5 MG tablet lisinopril 5 mg tablet      cetirizine (ZYRTEC ALLERGY) 10 MG tablet Zyrtec 10 mg tablet   Take 1 tablet every day by oral route.  rizatriptan (MAXALT) 10 MG tablet rizatriptan 10 mg tablet      testosterone cypionate (DEPOTESTOTERONE CYPIONATE) 200 MG/ML injection 0.4 ml weekly = 80 mg weekly      dutasteride (AVODART) 0.5 MG capsule Take 1 capsule by mouth daily 30 capsule 3     No current facility-administered medications for this visit.          REVIEW OF SYSTEMS:    Constitutional: no fever, no night sweats, no fatigue;   HEENT: no blurring of vision, no double vision, no hearing difficulty, no tinnitus,no ulceration, no dysphagia  Lungs: no cough, no shortness of breath, no wheeze;   CVS: no palpitation, no chest pain, no shortness of breath;  GI: no abdominal pain, no nausea , no vomiting, no constipation;   NIKHIL: no dysuria, frequency and urgency, no hematuria, no kidney stones;   Musculoskeletal: no joint pain, swelling , stiffness;   Endocrine: no polyuria, polydypsia, no cold or heat intolerence; recent CT abdomen and pelvis report from 1/29/2020 for details. Signed by Dr Jose Magaña on 2/13/2020 2:41 PM    Mri Thoracic Spine W Wo Contrast    Result Date: 2/14/2020  Impression: 1. No metastatic disease identified in the thoracic spine. In particular, there are no enhancing and/or infiltrative lesions identified in the thoracic spine or posterior ribs to correspond with the radiotracer abnormalities from recent bone scan. It is possible that the bone scan findings are secondary to vertebral and costovertebral degenerative change. Signed by Dr David Ba on 2/14/2020 8:49 AM    Ct Abdomen Pelvis W Iv Contrast Additional Contrast? Oral    Result Date: 1/29/2020  1. Scattered borderline size mesenteric lymph nodes. 2. Indeterminate 3 cm retroperitoneal soft tissue nodule within the upper left abdomen adjacent to the left renal vein. Signed by Dr Chris Brunner on 1/29/2020 9:49 AM    Nm Bone Scan Whole Body    Result Date: 1/29/2020  Focal increased activity in the mid thoracic spine and the right seventh posterior medial rib may represent chronic degenerative changes/large osteophytes. The possibility of a coincidental neoplastic process/metastatic lesion is not excluded. No other areas suggesting bony metastatic disease. Right knee arthroplasty.  Signed by Dr Layla Warren on 1/29/2020 1:11 PM         ASSESSMENT    Orders Placed This Encounter   Procedures    PET CT Skull Base To Mid Thigh     Standing Status:   Future     Standing Expiration Date:   2/21/2021     Order Specific Question:   Reason for exam:     Answer:   Prostate cancer    Comprehensive Metabolic Panel     Standing Status:   Future     Number of Occurrences:   1     Standing Expiration Date:   2/21/2021    Lactate Dehydrogenase     Standing Status:   Future     Number of Occurrences:   1     Standing Expiration Date:   2/21/2021    Beta 2 Microglobulin, Serum     Standing Status:   Future     Number of Occurrences:   1 Standing Expiration Date:   2/21/2021      Cory Mallory was seen today for prostate cancer. Diagnoses and all orders for this visit:    Care plan discussed with patient    Prostate cancer [facility])  -     PET CT Skull Base To Mid Thigh; Future  -     Comprehensive Metabolic Panel; Future  -     Lactate Dehydrogenase; Future  -     Beta 2 Microglobulin, Serum; Future    Retroperitoneal lymphadenopathy      Prostate cancer [id number]  The patient was counseled today about diagnosis, staging, prognosis, diagnostic tests, medications, side effects and disease management. The method of counseling included verbal explanation. The patient verbalized understanding. Essentially, clinical T2b lesion with suspicious retroperitoneal adenopathy. Recommend a PET scan to rule out metastatic disease. If PET scan shows increased SUV uptake in the retrosternal nodule then will discuss biopsy in a tertiary center. If PET scan is negative then will repeat the scans and 3-4-month to assess stability. PLAN:  · PET scan  · RTC 2 weeks  · Follow-up results of CMP, LDH and beta-2 microglobulin from today      Follow Up:     Return in about 2 weeks (around 3/6/2020) for follow-up w/Dr. Irina Vera. PET     Kala JONAS am scribing for Tasia Bird MD. Electronically signed by Josh Gimenez LPN on 1/84/9652 at 7:54 PM.     I, Dr Miguel Angel Luis, personally performed the services described in this documentation as scribed by Josh Gimenez LPN in my presence and is both accurate and complete. Over 50% of the total visit time of 60 minutes in face to face encounter with the patient, out of which more than 50% of the time was spent in counseling patient or family and coordination of care. Counseling included but was not limited to time spent reviewing labs, imaging studies/ treatment plan and answering questions.

## 2020-02-21 ENCOUNTER — HOSPITAL ENCOUNTER (OUTPATIENT)
Dept: INFUSION THERAPY | Age: 75
Discharge: HOME | End: 2020-02-21
Payer: MEDICARE

## 2020-02-21 ENCOUNTER — OFFICE VISIT (OUTPATIENT)
Dept: HEMATOLOGY | Age: 75
End: 2020-02-21
Payer: MEDICARE

## 2020-02-21 VITALS
OXYGEN SATURATION: 94 % | SYSTOLIC BLOOD PRESSURE: 126 MMHG | HEIGHT: 73 IN | DIASTOLIC BLOOD PRESSURE: 66 MMHG | BODY MASS INDEX: 31.66 KG/M2 | HEART RATE: 70 BPM

## 2020-02-21 DIAGNOSIS — C61 PROSTATE CANCER (HCC): ICD-10-CM

## 2020-02-21 PROCEDURE — G8427 DOCREV CUR MEDS BY ELIG CLIN: HCPCS | Performed by: INTERNAL MEDICINE

## 2020-02-21 PROCEDURE — 1036F TOBACCO NON-USER: CPT | Performed by: INTERNAL MEDICINE

## 2020-02-21 PROCEDURE — 83615 LACTATE (LD) (LDH) ENZYME: CPT

## 2020-02-21 PROCEDURE — G8484 FLU IMMUNIZE NO ADMIN: HCPCS | Performed by: INTERNAL MEDICINE

## 2020-02-21 PROCEDURE — 85025 COMPLETE CBC W/AUTO DIFF WBC: CPT

## 2020-02-21 PROCEDURE — 80053 COMPREHEN METABOLIC PANEL: CPT

## 2020-02-21 PROCEDURE — 1123F ACP DISCUSS/DSCN MKR DOCD: CPT | Performed by: INTERNAL MEDICINE

## 2020-02-21 PROCEDURE — 4040F PNEUMOC VAC/ADMIN/RCVD: CPT | Performed by: INTERNAL MEDICINE

## 2020-02-21 PROCEDURE — 99201 HC NEW PT, E/M LEVEL 1: CPT

## 2020-02-21 PROCEDURE — G8417 CALC BMI ABV UP PARAM F/U: HCPCS | Performed by: INTERNAL MEDICINE

## 2020-02-21 PROCEDURE — 3017F COLORECTAL CA SCREEN DOC REV: CPT | Performed by: INTERNAL MEDICINE

## 2020-02-21 PROCEDURE — 99205 OFFICE O/P NEW HI 60 MIN: CPT | Performed by: INTERNAL MEDICINE

## 2020-03-04 ENCOUNTER — HOSPITAL ENCOUNTER (OUTPATIENT)
Dept: NUCLEAR MEDICINE | Age: 75
Discharge: HOME | End: 2020-03-06
Payer: MEDICARE

## 2020-03-04 LAB
GLUCOSE BLD-MCNC: 93 MG/DL (ref 70–99)
PERFORMED ON: NORMAL

## 2020-03-04 PROCEDURE — A9552 F18 FDG: HCPCS | Performed by: INTERNAL MEDICINE

## 2020-03-04 PROCEDURE — 78815 PET IMAGE W/CT SKULL-THIGH: CPT

## 2020-03-04 PROCEDURE — 3430000000 HC RX DIAGNOSTIC RADIOPHARMACEUTICAL: Performed by: INTERNAL MEDICINE

## 2020-03-04 PROCEDURE — 82947 ASSAY GLUCOSE BLOOD QUANT: CPT

## 2020-03-04 RX ORDER — FLUDEOXYGLUCOSE F 18 200 MCI/ML
10 INJECTION, SOLUTION INTRAVENOUS
Status: COMPLETED | OUTPATIENT
Start: 2020-03-04 | End: 2020-03-04

## 2020-03-04 RX ADMIN — FLUDEOXYGLUCOSE F 18 10 MILLICURIE: 200 INJECTION, SOLUTION INTRAVENOUS at 15:15

## 2020-03-06 NOTE — PROGRESS NOTES
2.6    Relevant Imaging studies/reviewed by me:  Pet Ct Skull Base To Mid Thigh  Result Date: 3/4/2020  1. Isolated neoplastic focus within the upper abdomen left-sided retroperitoneum. Neoplastic lymphadenopathy is favored. This is not in a location which can be sampled by percutaneous biopsy. Otherwise, no bone lesion or chest abnormality is seen. 3. Other mildly prominent mesenteric lymphadenopathy shows low level FDG uptake and reactive nodes are favored over neoplastic mesenteric nodes. ASSESSMENT:    No orders of the defined types were placed in this encounter. Kellee Rodriguez was seen today for follow-up. Diagnoses and all orders for this visit:    Prostate cancer [facility])    Care plan discussed with patient    Coordination of complex care    Prostate cancer [id number]. The patient was counseled today about diagnosis, staging, prognosis, diagnostic tests, medications, side effects and disease management. The method of counseling included verbal explanation. Essentially, clinical T2b lesion with suspicious retroperitoneal adenopathy. PEt scan 03/04/2020: Isolated neoplastic focus within the upper abdomen left-sided retroperitoneum. Neoplastic lymphadenopathy is favored. I discussed this with Dr. Seng Castorena from [facility]. He is going to see the patient for consideration of a retroperitoneal node biopsy. PLAN:  · Referral to [facility] for consideration of biopsy. · RTC 3 weeks      Follow Up:     No follow-ups on file. Data Unavailable        3/9/2020   I, Dr Felice Rondon, personally performed the services described in this documentation as scribed by Kirill Dupree MA in my presence and is both accurate and complete. Over 50% of the total visit time of 25 minutes in face to face encounter with the patient, out of which more than 50% of the time was spent in counseling patient or family and coordination of care.  Counseling included but was not limited to time spent reviewing labs, imaging studies/ treatment plan and answering questions.

## 2020-03-09 ENCOUNTER — HOSPITAL ENCOUNTER (OUTPATIENT)
Dept: INFUSION THERAPY | Age: 75
Discharge: HOME | End: 2020-03-09
Payer: MEDICARE

## 2020-03-09 ENCOUNTER — OFFICE VISIT (OUTPATIENT)
Dept: HEMATOLOGY | Age: 75
End: 2020-03-09
Payer: MEDICARE

## 2020-03-09 VITALS
HEIGHT: 73 IN | BODY MASS INDEX: 31.95 KG/M2 | SYSTOLIC BLOOD PRESSURE: 118 MMHG | HEART RATE: 61 BPM | OXYGEN SATURATION: 96 % | WEIGHT: 241.1 LBS | DIASTOLIC BLOOD PRESSURE: 70 MMHG

## 2020-03-09 DIAGNOSIS — C61 PROSTATE CANCER (HCC): ICD-10-CM

## 2020-03-09 PROCEDURE — 4040F PNEUMOC VAC/ADMIN/RCVD: CPT | Performed by: INTERNAL MEDICINE

## 2020-03-09 PROCEDURE — 1123F ACP DISCUSS/DSCN MKR DOCD: CPT | Performed by: INTERNAL MEDICINE

## 2020-03-09 PROCEDURE — 3017F COLORECTAL CA SCREEN DOC REV: CPT | Performed by: INTERNAL MEDICINE

## 2020-03-09 PROCEDURE — G8417 CALC BMI ABV UP PARAM F/U: HCPCS | Performed by: INTERNAL MEDICINE

## 2020-03-09 PROCEDURE — 99213 OFFICE O/P EST LOW 20 MIN: CPT

## 2020-03-09 PROCEDURE — 85025 COMPLETE CBC W/AUTO DIFF WBC: CPT

## 2020-03-09 PROCEDURE — 1036F TOBACCO NON-USER: CPT | Performed by: INTERNAL MEDICINE

## 2020-03-09 PROCEDURE — G8427 DOCREV CUR MEDS BY ELIG CLIN: HCPCS | Performed by: INTERNAL MEDICINE

## 2020-03-09 PROCEDURE — 99214 OFFICE O/P EST MOD 30 MIN: CPT | Performed by: INTERNAL MEDICINE

## 2020-03-09 PROCEDURE — G8484 FLU IMMUNIZE NO ADMIN: HCPCS | Performed by: INTERNAL MEDICINE

## 2020-03-19 ENCOUNTER — TELEPHONE (OUTPATIENT)
Dept: INFUSION THERAPY | Age: 75
End: 2020-03-19

## 2020-04-10 NOTE — PROGRESS NOTES
Angelina Rowley   [birth year]  4/13/2020     Chief Complaint   Patient presents with    Other     Prostate cancer ([facility] [address]        INTERVAL HISTORY/HISTORY OF PRESENT ILLNESS:    Diagnosis  · Prostate cancer, February 2020  · [id number]??  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 Treatment summary   · 03/24/2020- Retroperitoneal ressection  · Anticipated RT retroperitoneal    The patient underwent a retroperitoneal resection. Path consistent with low-grade lymphoma. The patient is healing well from surgery. Surgical incision is well-healed. He is curious to know about his prostate cancer treatment. Cancer History:  Mr. Shawn Alvarez was first seen by me on 2/21/2020 referred by Dr. James Harris for further evaluation and recommendations regarding findings of retroperitoneal mass/adenopathy. The patient has recently been diagnosed with prostate cancer with 4 out of 12 positive cores from the left prostatic lobe. PSA at diagnosis 5.5. Pathology showed prostatic adenocarcinoma grade group 2 in the [facility] score 3+4 = 7. Clinical T2b. Imaging studies with CT chest abdomen pelvis showed evidence of retroperitoneal mass. · 1/29/2020- CT of the abdomen with contrast showed scattered borderline sized mesenteric lymph nodes. Indeterminate 3 cm retroperitoneal soft tissue nodule within the upper left abdomen adjacent to the left renal vein. The mass measures 34 x 30 x 22 mm. · 1/29/2020- bone scan showed focal increased activity in the mid thoracic spine in the right seventh posterior medial rib which may represent chronic degenerative changes/large osteophytes. No other areas suggesting bony metastatic disease. · 2/13/2020- MRI thoracic spine showed no evidence of metastatic disease identified in the thoracic spine.   In particular, there are no enhancing and/or infiltrative lesions Note: Bone Marrow Aspirate and Biopsy    Indication: Follicular lymphoma    Informed consent was signed by Marisol Carter. he  was placed in the left lateral decubitus position. The patient was prepped and draped in sterile fashion. Lidocaine 1% was used for local anesthetic of the skin and periosteum. A bone marrow aspirate and biopsy was obtained from the [facility] and sent for surgical pathology review,  flow cytometry, and chromosome analysis. The total blood loss was less than 3 mL. The skin was cleaned and a sterile bandage was placed on the entrance site. Marisol Carter tolerated the procedure without complication. Gloria Vasquez MD    04/13/20  2:57 PM    ASSESSMENT:    Orders Placed This Encounter   Procedures    Comprehensive Metabolic Panel     Standing Status:   Future     Standing Expiration Date:   4/13/2021    Lactate Dehydrogenase     Standing Status:   Future     Standing Expiration Date:   4/13/2021    Beta 2 Microglobulin, Serum     Standing Status:   Future     Standing Expiration Date:   4/13/2021    Hepatitis Panel, Acute     Standing Status:   Future     Standing Expiration Date:   4/13/2021      Alfredo Cruz was seen today for other. Diagnoses and all orders for this visit:    Care plan discussed with patient    Follicular lymphoma grade I of intra-abdominal lymph nodes ([facility] [address]  -     Comprehensive Metabolic Panel; Future  -     Lactate Dehydrogenase; Future  -     Beta 2 Microglobulin, Serum; Future  -     Hepatitis Panel, Acute; Future    Prostate cancer [facility])  -     Comprehensive Metabolic Panel; Future  -     Lactate Dehydrogenase; Future  -     Beta 2 Microglobulin, Serum; Future  -     Hepatitis Panel, Acute; Future    Coordination of complex care    Nonspecific elevation of levels of transaminase and lactic acid dehydrogenase (ldh)   -     Hepatitis Panel, Acute; Future    Hot flash in male    Other orders  -     oxybutynin (DITROPAN) 5 MG tablet;  Take 1 tablet by

## 2020-04-13 ENCOUNTER — OFFICE VISIT (OUTPATIENT)
Dept: HEMATOLOGY | Age: 75
End: 2020-04-13
Payer: MEDICARE

## 2020-04-13 ENCOUNTER — HOSPITAL ENCOUNTER (OUTPATIENT)
Dept: INFUSION THERAPY | Age: 75
Discharge: HOME | End: 2020-04-13
Payer: MEDICARE

## 2020-04-13 VITALS
HEIGHT: 73 IN | WEIGHT: 239 LBS | DIASTOLIC BLOOD PRESSURE: 70 MMHG | TEMPERATURE: 97.5 F | BODY MASS INDEX: 31.68 KG/M2 | HEART RATE: 106 BPM | SYSTOLIC BLOOD PRESSURE: 118 MMHG | OXYGEN SATURATION: 95 %

## 2020-04-13 PROCEDURE — 99214 OFFICE O/P EST MOD 30 MIN: CPT | Performed by: INTERNAL MEDICINE

## 2020-04-13 PROCEDURE — 1123F ACP DISCUSS/DSCN MKR DOCD: CPT | Performed by: INTERNAL MEDICINE

## 2020-04-13 PROCEDURE — 1036F TOBACCO NON-USER: CPT | Performed by: INTERNAL MEDICINE

## 2020-04-13 PROCEDURE — 4040F PNEUMOC VAC/ADMIN/RCVD: CPT | Performed by: INTERNAL MEDICINE

## 2020-04-13 PROCEDURE — G8417 CALC BMI ABV UP PARAM F/U: HCPCS | Performed by: INTERNAL MEDICINE

## 2020-04-13 PROCEDURE — 85025 COMPLETE CBC W/AUTO DIFF WBC: CPT

## 2020-04-13 PROCEDURE — G8427 DOCREV CUR MEDS BY ELIG CLIN: HCPCS | Performed by: INTERNAL MEDICINE

## 2020-04-13 PROCEDURE — 38222 DX BONE MARROW BX & ASPIR: CPT | Performed by: INTERNAL MEDICINE

## 2020-04-13 PROCEDURE — 3017F COLORECTAL CA SCREEN DOC REV: CPT | Performed by: INTERNAL MEDICINE

## 2020-04-13 RX ORDER — OXYBUTYNIN CHLORIDE 5 MG/1
5 TABLET ORAL 2 TIMES DAILY
Qty: 90 TABLET | Refills: 3 | Status: SHIPPED | OUTPATIENT
Start: 2020-04-13 | End: 2020-09-11 | Stop reason: SDUPTHER

## 2020-04-13 RX ORDER — LIDOCAINE HYDROCHLORIDE 20 MG/ML
10 INJECTION, SOLUTION EPIDURAL; INFILTRATION; INTRACAUDAL; PERINEURAL ONCE
Status: DISCONTINUED | OUTPATIENT
Start: 2020-04-13 | End: 2020-04-15 | Stop reason: HOSPADM

## 2020-04-16 ENCOUNTER — TELEPHONE (OUTPATIENT)
Dept: UROLOGY | Age: 75
End: 2020-04-16

## 2020-04-23 NOTE — PROGRESS NOTES
Berto Noman   [birth year]  4/24/2020     Chief Complaint   Patient presents with    Follow-up     Prostate cancer         INTERVAL HISTORY/HISTORY OF PRESENT ILLNESS:    Diagnosis  · Prostate cancer, February 2020  · [id number]??  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 Stage I A    Treatment summary   · 03/24/2020- Retroperitoneal ressection at [place]  · Anticipated IFRT retroperitoneal  · Anticipated treatment decision by urology for his prostate cancer      The patient is a pleasant 69years old male who has been diagnosed with prostate cancer. The patient was found to have a retroperitoneal mass. He underwent resection of the mass consistent with follicular lymphoma grade 1-2. The patient also had a PET scan for further staging and bone marrow biopsy that showed no other evidence of lymphomatous involvement. Therefore stage I disease. He presents today to discuss the results of his bone marrow biopsy for treatment recommendations. He has an appointment with urology next Monday to discuss further treatment plan regarding his prostate cancer. Cancer History:  Mr. Virginia Singh was first seen by me on 2/21/2020 referred by Dr. Berny Schneider for further evaluation and recommendations regarding findings of retroperitoneal mass/adenopathy. The patient has recently been diagnosed with prostate cancer with 4 out of 12 positive cores from the left prostatic lobe. PSA at diagnosis 5.5. Pathology showed prostatic adenocarcinoma grade group 2 in the Bartow score 3+4 = 7. Clinical T2b. Imaging studies with CT chest abdomen pelvis showed evidence of retroperitoneal mass. · 1/29/2020- CT of the abdomen with contrast showed scattered borderline sized mesenteric lymph nodes.   Indeterminate 3 cm retroperitoneal soft tissue nodule within the upper left abdomen adjacent to the left background T cells in interfollicular areas. · 4/13/2020- recommend bone marrow to complete staging for his lymphoma. Recommended to follow-up with urology for discussion of treatment for his prostate cancer. If bone marrow shows no evidence of lymphoma involvement then will recommend involved field radiation therapy to the retroperitoneal lymph nodes. · 4/13/2020-bone marrow biopsy/aspirate was unremarkable. No evidence of lymphomatous involvement. · 4/24/2020- I discussed with the patient the findings of bone marrow PET scan. Therefore, stage I A. Recommended involved field RT to the retroperitoneal nodes.       PAST MEDICAL HISTORY:   Past Medical History:   Diagnosis Date    Diabetes mellitus ([facility] [address]     Elevated PSA     Hypertension     Kidney stone     Prostate cancer ([facility] [address]      PAST SURGICAL HISTORY:  Past Surgical History:   Procedure Laterality Date    APPENDECTOMY      JOINT REPLACEMENT  2014    SHOULDER SURGERY  2016    VASECTOMY  1979        SOCIAL HISTORY:  Social History     Socioeconomic History    Marital status:      Spouse name: Not on file    Number of children: Not on file    Years of education: Not on file    Highest education level: Not on file   Occupational History    Not on file   Social Needs    Financial resource strain: Not on file    Food insecurity     Worry: Not on file     Inability: Not on file    Transportation needs     Medical: Not on file     Non-medical: Not on file   Tobacco Use    Smoking status: Never Smoker    Smokeless tobacco: Never Used   Substance and Sexual Activity    Alcohol use: Not on file    Drug use: Not on file    Sexual activity: Not on file   Lifestyle    Physical activity     Days per week: Not on file     Minutes per session: Not on file    Stress: Not on file   Relationships    Social connections     Talks on phone: Not on file     Gets together: Not on file     Attends Protestant service: Not on file     Active member of tinnitus,no ulceration, no dysphagia  Lungs: no cough, no shortness of breath, no wheeze;   CVS: no palpitation, no chest pain, no shortness of breath;  GI: no abdominal pain, no nausea , no vomiting, no constipation;   NIKHIL: no dysuria, frequency and urgency, no hematuria, no kidney stones;   Musculoskeletal: no joint pain, swelling , stiffness;   Endocrine: no polyuria, polydypsia, no cold or heat intolerence; hot flashes improving  Hematology/lymphatic: no easy brusing or bleeding, no hx of clotting disorder; no peripheral adenopathy. Dermatology: no skin rash, no eczema, no pruritis;   Psychiatry: no depression, no anxiety,no panic attacks, no suicide ideation; Neurology: no syncope, no seizures, no numbness or tingling of hands, no numbness or tingling of feet, no paresis;     PHYSICAL EXAM:    Vitals signs:  /62   Pulse 77   Temp 98.2 °F (36.8 °C)   Ht 6' 1\" (1.854 m)   Wt 238 lb 11.2 oz (108.3 kg)   SpO2 94%   BMI 31.49 kg/m²    Pain scale:  Pain Score:   0 - No pain     CONSTITUTIONAL: Alert, appropriate, no acute distress,   EYES: Non icteric, EOM intact, pupils equal round and reactive to light and accommodation. ENT: Oral mucus membranes moist, no oral pharyngeal lesions. External inspecti/on of ears and nose are normal.   NECK: Supple, no masses. No palpable thyroid mass    CHEST/LUNGS: CTA bilaterally, normal respiratory effort   CARDIOVASCULAR: RRR, no murmurs. No lower extremity edema   ABDOMEN: soft non-tender, active bowel sounds, no hepatosplenomegaly. No palpable masses. EXTREMITIES: warm, Full ROM of all fours extremities. No focal weakness. SKIN: warm, dry with no rashes or lesions  LYMPH: No cervical, clavicular, axillary, or inguinal lymphadenopathy  NEUROLOGIC: follows commands, non focal.   PSYCH: mood and affect appropriate. Alert and oriented to time and place and person.     Relevant Lab findings/reviewed by me:  SVR:2/82/6777  WBC-6.73  HGB-13.6  PLT-150,000  Neut-3.82    4/13/2020  Hepatitis Panel-Negative  B2M-2.7  LDH-168      Relevant Imaging studies/reviewed by me:  No results found. ASSESSMENT:    Orders Placed This Encounter   Procedures    External Referral To Radiation Oncology     Referral Priority:   Routine     Referral Type:   Eval and Treat     Referral Reason:   Specialty Services Required     Requested Specialty:   Radiation Oncology     Number of Visits Requested:   1      Milagros Bradford was seen today for follow-up. Diagnoses and all orders for this visit:    Care plan discussed with patient    Follicular lymphoma grade I of intra-abdominal lymph nodes ([facility] [address]  -     External Referral To Radiation Oncology    Prostate cancer [facility])    Coordination of complex care    Prostate cancer [id number]. Recommend to follow-up with Dr. Elsi Braun next Monday for further discussion of treatment. His prostate cancer should be managed independently of his new diagnosis of follicular lymphoma. Follicular lymphoma grade 1-2, Ki-67 10%, Stage I  The patient was counseled today about diagnosis, staging, prognosis, diagnostic tests, medications, side effects and disease management. The method of counseling included verbal explanation. The patient verbalized understanding. Essentially, retroperitoneal adenopathy consistent with follicular lymphoma grade 1-2 with low Ki-67. The patient had a significant debulking surgery. Bone marrow biopsy showed no evidence of lymphomatous involvement. PET scan showed no other evidence of metastatic disease. Therefore, recommended involved field RT as per NCCN guidelines. Hot flashes- prescribed oxybutynin 5 mg p.o. twice daily with symptomatic relief.     PLAN:  · Follow-up with Dr. Elsi Braun  · Follow-up with me in August  · Continue oxybutynin 5 mg p.o. twice daily  · Refer to radiation oncology in [place], [place] (discussed with radiation oncologist, Amber Millard Isabela Jama)      Follow Up:     Return in about 4 months (around 8/24/2020) for an appointment with Dr. Jennifer Henriquez. Referral to Dr. Sayra Umaña for Radiation        4/25/2020   I, Dr Risa Hill, personally performed the services described in this documentation as scribed by Candace Hwang MA in my presence and is both accurate and complete. Over 50% of the total visit time of 25 minutes in face to face encounter with the patient, out of which more than 50% of the time was spent in counseling patient or family and coordination of care. Counseling included but was not limited to time spent reviewing labs, imaging studies/ treatment plan and answering questions.

## 2020-04-24 ENCOUNTER — OFFICE VISIT (OUTPATIENT)
Dept: HEMATOLOGY | Age: 75
End: 2020-04-24
Payer: MEDICARE

## 2020-04-24 ENCOUNTER — HOSPITAL ENCOUNTER (OUTPATIENT)
Dept: INFUSION THERAPY | Age: 75
Discharge: HOME | End: 2020-04-24
Payer: MEDICARE

## 2020-04-24 VITALS
HEART RATE: 77 BPM | DIASTOLIC BLOOD PRESSURE: 62 MMHG | OXYGEN SATURATION: 94 % | WEIGHT: 238.7 LBS | BODY MASS INDEX: 31.63 KG/M2 | HEIGHT: 73 IN | TEMPERATURE: 98.2 F | SYSTOLIC BLOOD PRESSURE: 122 MMHG

## 2020-04-24 PROBLEM — C82.03 FOLLICULAR LYMPHOMA GRADE I OF INTRA-ABDOMINAL LYMPH NODES (HCC): Status: ACTIVE | Noted: 2020-04-24

## 2020-04-24 PROCEDURE — 99214 OFFICE O/P EST MOD 30 MIN: CPT | Performed by: INTERNAL MEDICINE

## 2020-04-24 PROCEDURE — 85025 COMPLETE CBC W/AUTO DIFF WBC: CPT

## 2020-04-24 PROCEDURE — 1123F ACP DISCUSS/DSCN MKR DOCD: CPT | Performed by: INTERNAL MEDICINE

## 2020-04-24 PROCEDURE — 4040F PNEUMOC VAC/ADMIN/RCVD: CPT | Performed by: INTERNAL MEDICINE

## 2020-04-24 PROCEDURE — 3017F COLORECTAL CA SCREEN DOC REV: CPT | Performed by: INTERNAL MEDICINE

## 2020-04-24 PROCEDURE — 99211 OFF/OP EST MAY X REQ PHY/QHP: CPT

## 2020-04-24 PROCEDURE — G8417 CALC BMI ABV UP PARAM F/U: HCPCS | Performed by: INTERNAL MEDICINE

## 2020-04-24 PROCEDURE — G8427 DOCREV CUR MEDS BY ELIG CLIN: HCPCS | Performed by: INTERNAL MEDICINE

## 2020-04-24 PROCEDURE — 1036F TOBACCO NON-USER: CPT | Performed by: INTERNAL MEDICINE

## 2020-04-27 ENCOUNTER — OFFICE VISIT (OUTPATIENT)
Dept: UROLOGY | Age: 75
End: 2020-04-27
Payer: MEDICARE

## 2020-04-27 VITALS — HEIGHT: 73 IN | BODY MASS INDEX: 31.54 KG/M2 | WEIGHT: 238 LBS | TEMPERATURE: 97.9 F

## 2020-04-27 LAB
APPEARANCE FLUID: CLEAR
BILIRUBIN, POC: NORMAL
BLOOD URINE, POC: NORMAL
CLARITY, POC: CLEAR
COLOR, POC: YELLOW
GLUCOSE URINE, POC: NORMAL
KETONES, POC: NORMAL
LEUKOCYTE EST, POC: NORMAL
NITRITE, POC: NORMAL
PH, POC: 5.5
PROTEIN, POC: NORMAL
SPECIFIC GRAVITY, POC: 1.03
UROBILINOGEN, POC: 0.2

## 2020-04-27 PROCEDURE — 1036F TOBACCO NON-USER: CPT | Performed by: UROLOGY

## 2020-04-27 PROCEDURE — G8417 CALC BMI ABV UP PARAM F/U: HCPCS | Performed by: UROLOGY

## 2020-04-27 PROCEDURE — 1123F ACP DISCUSS/DSCN MKR DOCD: CPT | Performed by: UROLOGY

## 2020-04-27 PROCEDURE — 81002 URINALYSIS NONAUTO W/O SCOPE: CPT | Performed by: UROLOGY

## 2020-04-27 PROCEDURE — G8427 DOCREV CUR MEDS BY ELIG CLIN: HCPCS | Performed by: UROLOGY

## 2020-04-27 PROCEDURE — 96402 CHEMO HORMON ANTINEOPL SQ/IM: CPT | Performed by: UROLOGY

## 2020-04-27 PROCEDURE — 4040F PNEUMOC VAC/ADMIN/RCVD: CPT | Performed by: UROLOGY

## 2020-04-27 PROCEDURE — 99214 OFFICE O/P EST MOD 30 MIN: CPT | Performed by: UROLOGY

## 2020-04-27 PROCEDURE — 3017F COLORECTAL CA SCREEN DOC REV: CPT | Performed by: UROLOGY

## 2020-04-27 ASSESSMENT — ENCOUNTER SYMPTOMS
BACK PAIN: 1
SORE THROAT: 0
SHORTNESS OF BREATH: 0
DIARRHEA: 0
ABDOMINAL PAIN: 0
EYE REDNESS: 0
COLOR CHANGE: 0
FACIAL SWELLING: 0
EYE DISCHARGE: 0
COUGH: 0
ABDOMINAL DISTENTION: 0
CONSTIPATION: 0
NAUSEA: 0
RHINORRHEA: 0
WHEEZING: 0
EYE PAIN: 0
BLOOD IN STOOL: 0
SINUS PRESSURE: 0
VOMITING: 0

## 2020-04-27 NOTE — PROGRESS NOTES
lymphoma. Past Medical History:   Diagnosis Date    Diabetes mellitus ([facility] [address]     Elevated PSA     Hypertension     Kidney stone     Prostate cancer [facility])        Past Surgical History:   Procedure Laterality Date    APPENDECTOMY      JOINT REPLACEMENT  2014    SHOULDER SURGERY  2016    VASECTOMY  1979       Current Outpatient Medications   Medication Sig Dispense Refill    oxybutynin (DITROPAN) 5 MG tablet Take 1 tablet by mouth 2 times daily 90 tablet 3    verapamil (VERELAN) 240 MG extended release capsule verapamil  mg 24 hr capsule,extended release      rivaroxaban (XARELTO) 20 MG TABS tablet Take 20 mg by mouth      glipiZIDE (GLUCOTROL) 5 MG tablet Take 5 mg by mouth      meclizine (ANTIVERT) 25 MG tablet Take 25 mg by mouth      pantoprazole sodium (PROTONIX) 40 MG PACK packet Take 40 mg by mouth every morning (before breakfast)      SITagliptin (JANUVIA) 100 MG tablet Take 100 mg by mouth      sertraline (ZOLOFT) 100 MG tablet sertraline 100 mg tablet      lisinopril (PRINIVIL;ZESTRIL) 5 MG tablet lisinopril 5 mg tablet      cetirizine (ZYRTEC ALLERGY) 10 MG tablet Zyrtec 10 mg tablet   Take 1 tablet every day by oral route.  rizatriptan (MAXALT) 10 MG tablet rizatriptan 10 mg tablet      dutasteride (AVODART) 0.5 MG capsule Take 1 capsule by mouth daily 30 capsule 3     No current facility-administered medications for this visit.         Allergies   Allergen Reactions    Ticlopidine Hives     swelling         Social History     Socioeconomic History    Marital status:      Spouse name: None    Number of children: None    Years of education: None    Highest education level: None   Occupational History    None   Social Needs    Financial resource strain: None    Food insecurity     Worry: None     Inability: None    Transportation needs     Medical: None     Non-medical: None   Tobacco Use    Smoking status: Never Smoker    Smokeless tobacco: Never Used Substance and Sexual Activity    Alcohol use: None    Drug use: None    Sexual activity: None   Lifestyle    Physical activity     Days per week: None     Minutes per session: None    Stress: None   Relationships    Social connections     Talks on phone: None     Gets together: None     Attends Confucianist service: None     Active member of club or organization: None     Attends meetings of clubs or organizations: None     Relationship status: None    Intimate partner violence     Fear of current or ex partner: None     Emotionally abused: None     Physically abused: None     Forced sexual activity: None   Other Topics Concern    None   Social History Narrative    None       Family History   Problem Relation Age of Onset    Heart Attack Father 72        cause of death    Kidney Disease Mother     Other Mother         renal failure    Cancer Sister [de-identified]        Colon CA- cause of death     Heart Attack Sister     Colon Cancer Sister 68       REVIEW OF SYSTEMS:  Review of Systems   Constitutional: Negative for activity change, chills, fatigue and fever. HENT: Negative for congestion, ear discharge, ear pain, facial swelling, mouth sores, rhinorrhea, sinus pressure and sore throat. Eyes: Negative for pain, discharge and redness. Respiratory: Negative for cough, shortness of breath and wheezing. Cardiovascular: Negative for chest pain, palpitations and leg swelling. Gastrointestinal: Negative for abdominal distention, abdominal pain, blood in stool, constipation, diarrhea, nausea and vomiting. Endocrine: Negative for polydipsia, polyphagia and polyuria. Genitourinary: Negative for decreased urine volume, difficulty urinating, dysuria, enuresis, flank pain, frequency, genital sores, hematuria and urgency. Musculoskeletal: Positive for back pain. Negative for gait problem, joint swelling, neck pain and neck stiffness. Skin: Negative for color change, rash and wound.    Allergic/Immunologic:

## 2020-04-27 NOTE — PROGRESS NOTES
Per Dr Aldo Ruiz orders a 6-month Lupron (45mg), was given in the left hip without difficulty. Patient is only to receive one injection.  Patient will follow up 6 weeks after completing radiation therapy with PSA drawn

## 2020-06-02 ENCOUNTER — TELEPHONE (OUTPATIENT)
Dept: ENDOCRINOLOGY | Facility: CLINIC | Age: 75
End: 2020-06-02

## 2020-06-02 NOTE — TELEPHONE ENCOUNTER
annabel with Dr Junior office needs latest labs hemoglobin A1 C  Fax to   [phone].    Thank you

## 2020-09-10 NOTE — PROGRESS NOTES
Rose Mary Liang   [birth year]  9/11/2020     Chief Complaint   Patient presents with    Follow-up     Prostate cancer        INTERVAL HISTORY/HISTORY OF PRESENT ILLNESS:    Diagnosis  · Prostate cancer, February 2020  · [id number]  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 Parrish Hodges  · 6/24/20-8/21/20- 78 cGy in 42 fractions for prostate cancer -[facility]/Dr. Parrish Hodges    The patient is a pleasant 77years old male who has been diagnosed with a synchronous prostate cancer and follicular lymphoma. The patient was found to have a retroperitoneal mass. He underwent resection of the mass at [address] [place] [address] consistent with follicular lymphoma grade 1-2. The patient also had a PET scan for further staging and bone marrow biopsy that showed no other evidence of lymphomatous involvement. Therefore stage I disease. He was recommended radiation treatment for his prostate and IFRT. Cancer History:  Mr. Fernando Mitchell was first seen by me on 2/21/2020 referred by Dr. Jam Mendosa for further evaluation and recommendations regarding findings of retroperitoneal mass/adenopathy. The patient has recently been diagnosed with prostate cancer with 4 out of 12 positive cores from the left prostatic lobe. PSA at diagnosis 5.5. Pathology showed prostatic adenocarcinoma grade group 2 in the Duke score 3+4 = 7. Clinical T2b. Imaging studies with CT chest abdomen pelvis showed evidence of retroperitoneal mass. · 1/29/2020- CT of the abdomen with contrast showed scattered borderline sized mesenteric lymph nodes.   Indeterminate 3 cm retroperitoneal soft tissue nodule within the upper left abdomen adjacent to the left renal vein. The mass measures 34 x 30 x 22 mm. · 1/29/2020- bone scan showed focal increased activity in the mid thoracic spine in the right seventh posterior medial rib which may represent chronic degenerative changes/large osteophytes. No other areas suggesting bony metastatic disease. · 2/13/2020- MRI thoracic spine showed no evidence of metastatic disease identified in the thoracic spine. In particular, there are no enhancing and/or infiltrative lesions identified in the thoracic spine or posterior ribs to correspond with the radiotracer activities from recent bone scan. It is possible that the bone scan findings are secondary to vertebral\" the vertebral degenerative change. · 2/13/2020- CT chest showed no evidence of metastatic disease in the chest.  Nodule in the retroperitoneum of the upper abdomen presumably a lymph node. Additional borderline lymph nodes seen within the central mesenteric root. · 2/21/2020- we discussed the findings of the images above. Recommend a PET scan to rule out metastatic disease. · 03/04/200- Pet Ct- Isolated neoplastic focus within the upper abdomen left-sided retroperitoneum SUV 11. Neoplastic lymphadenopathy is favored. This is not in a location which can be sampled by percutaneous biopsy. Otherwise no bone lesion or chest abnormality is seen. 3. Other mildly prominent mesenteric lymphadenopathy shows low level FDG uptake and reactive nodes are favored over neoplastic mesenteric nodes. · 3/24/2020- retroperitoneal mass resection3 cm mass off of the overlying the left renal vein at [facility] by Dr. Jonas Solorzano, surgical oncology. Path compatible with follicular lymphoma, grade 1-2 out of 3. CD20-positive B cells that are positive for PAX-5, CD10, BCL6, and BCL-2 and negative for Cyclin D1. A CD21 stain highlights follicular dendritic meshworks associated with the B cell nodules.  The Ki-67 proliferative index is approximately 10% within the activity     Days per week: Not on file     Minutes per session: Not on file    Stress: Not on file   Relationships    Social connections     Talks on phone: Not on file     Gets together: Not on file     Attends Baptism service: Not on file     Active member of club or organization: Not on file     Attends meetings of clubs or organizations: Not on file     Relationship status: Not on file    Intimate partner violence     Fear of current or ex partner: Not on file     Emotionally abused: Not on file     Physically abused: Not on file     Forced sexual activity: Not on file   Other Topics Concern    Not on file   Social History Narrative    Not on file        FAMILY HISTORY:  Family History   Problem Relation Age of Onset    Heart Attack Father 72        cause of death    Kidney Disease Mother     Other Mother         renal failure    Cancer Sister [de-identified]        Colon CA- cause of death     Heart Attack Sister     Colon Cancer Sister 68        Current Outpatient Medications   Medication Sig Dispense Refill    cefdinir (OMNICEF) 300 MG capsule TAKE 1 CAPSULE BY MOUTH EVERY 12 HOURS      gabapentin (NEURONTIN) 100 MG capsule gabapentin 100 mg capsule      TRUE METRIX BLOOD GLUCOSE TEST strip USE 1 STRIP TO CHECK GLUCOSE TWICE DAILY      glyBURIDE (DIABETA) 5 MG tablet glyburide 5 mg tablet   Take 2 tablets twice a day by oral route.       nitrofurantoin (MACRODANTIN) 100 MG capsule nitrofurantoin macrocrystal 100 mg capsule      ondansetron (ZOFRAN) 4 MG tablet ondansetron HCl 4 mg tablet      tamsulosin (FLOMAX) 0.4 MG capsule       Travoprost, JET Free, (TRAVATAN Z) 0.004 % SOLN ophthalmic solution Travatan Z 0.004 % eye drops   INSTILL 1 DROP INTO EACH EYE AT BEDTIME      zoster recombinant adjuvanted vaccine (SHINGRIX) 50 MCG/0.5ML SUSR injection Shingrix (PF) 50 mcg/0.5 mL intramuscular suspension, kit      pantoprazole (PROTONIX) 40 MG tablet TAKE 1 TABLET BY MOUTH ONCE DAILY      oxybutynin (DITROPAN) 5 MG tablet Take 1 tablet by mouth 2 times daily 60 tablet 5    verapamil (VERELAN) 240 MG extended release capsule verapamil  mg 24 hr capsule,extended release      rivaroxaban (XARELTO) 20 MG TABS tablet Take 20 mg by mouth      glipiZIDE (GLUCOTROL) 5 MG tablet Take 5 mg by mouth      meclizine (ANTIVERT) 25 MG tablet Take 25 mg by mouth      SITagliptin (JANUVIA) 100 MG tablet Take 100 mg by mouth      sertraline (ZOLOFT) 100 MG tablet sertraline 100 mg tablet      lisinopril (PRINIVIL;ZESTRIL) 5 MG tablet lisinopril 5 mg tablet      cetirizine (ZYRTEC ALLERGY) 10 MG tablet Zyrtec 10 mg tablet   Take 1 tablet every day by oral route.  rizatriptan (MAXALT) 10 MG tablet rizatriptan 10 mg tablet      dutasteride (AVODART) 0.5 MG capsule Take 1 capsule by mouth daily 30 capsule 3     No current facility-administered medications for this visit. REVIEW OF SYSTEMS:    Constitutional: no fever, no night sweats, no fatigue  HEENT: no blurring of vision, no double vision, no hearing difficulty, no tinnitus,no ulceration, no dysphagia  Lungs: no cough, no shortness of breath, no wheeze;   CVS: no palpitation, no chest pain, no shortness of breath;  GI: no abdominal pain, no nausea , no vomiting, no constipation;   NIKHIL: no dysuria, frequency and urgency, no hematuria, no kidney stones;   Musculoskeletal: no joint pain, swelling , stiffness;   Endocrine: no polyuria, polydypsia, no cold or heat intolerence; hot flashes improving  Hematology/lymphatic: no easy brusing or bleeding, no hx of clotting disorder; no peripheral adenopathy. Dermatology: no skin rash, no eczema, no pruritis;   Psychiatry: no depression, no anxiety,no panic attacks, no suicide ideation;    Neurology: no syncope, no seizures, no numbness or tingling of hands, no numbness or tingling of feet, no paresis;     PHYSICAL EXAM:    Vitals signs:  BP (!) 142/68   Pulse 74   Temp 97.1 °F (36.2 °C) (Temporal)   Ht 6' 1\" cancer    Comprehensive Metabolic Panel     Standing Status:   Future     Standing Expiration Date:   9/11/2021    Lactate Dehydrogenase     Standing Status:   Future     Standing Expiration Date:   9/11/2021      Andres Reyes was seen today for follow-up. Diagnoses and all orders for this visit:    Prostate cancer ([facility] [address]  -     oxybutynin (DITROPAN) 5 MG tablet; Take 1 tablet by mouth 2 times daily  -     CT ABDOMEN PELVIS W IV CONTRAST Additional Contrast? Oral; Future  -     CT CHEST W CONTRAST; Future    Follicular lymphoma grade I of intra-abdominal lymph nodes (HCC)  -     CT ABDOMEN PELVIS W IV CONTRAST Additional Contrast? Oral; Future  -     CT CHEST W CONTRAST; Future  -     Comprehensive Metabolic Panel; Future  -     Lactate Dehydrogenase; Future    Hot flash in male  -     oxybutynin (DITROPAN) 5 MG tablet; Take 1 tablet by mouth 2 times daily  -     CT ABDOMEN PELVIS W IV CONTRAST Additional Contrast? Oral; Future    Care plan discussed with patient    Healthcare maintenance    Prostate cancer [id number]. Follow-up with urology/radiation oncology for surveillance. Follicular lymphoma grade 1-2, Ki-67 10%, Stage I  Essentially, retroperitoneal adenopathy consistent with follicular lymphoma grade 1-2 with low Ki-67. The patient had a significant debulking surgery at [address] [place] [address]. Bone marrow biopsy showed no evidence of lymphomatous involvement. PET scan showed no other evidence of metastatic disease. Therefore stage I disease. He received involved field RT as per NCCN guidelines completed July 2020. Recommended surveillance NCCN recommendation  CT scans chest abdomen pelvis every 6 months x2 years through July 2022    Hot flashes- prescribed oxybutynin 5 mg p.o. twice daily with symptomatic relief. Health Maintenance:  The patient is encouraged to follow-up with primary care regularly for further recommendations regarding age appropriated screening for cancer, well-being visit (preventative measures), follow-up and treatment of other medical comorbidities. PLAN:  · Follow-up with Dr. George Costa/radiation oncology for prostate cancer surveillance  · Follow-up with me by virtual visit after CT scans  · Continue oxybutynin 5 mg p.o. twice daily  · CT scans in 4 months then every 6 months for 2 years  · PSA to be done by Dr. Kendrick and Dr. Mai Lira recommendations      Follow Up:     Return for an appointment with Dr. Naranjo Officer by virtual visit [phone]. CT scans & labs in 4 months  RTC w/Dr. POLLARD after CT scans by virtual visit        9/12/2020   I, Dr Shonda Pollard, personally performed the services described in this documentation as scribed by Micah Omer MA in my presence and is both accurate and complete. Over 50% of the total visit time of 15 minutes in face to face encounter with the patient, out of which more than 50% of the time was spent in counseling patient or family and coordination of care. Counseling included but was not limited to time spent reviewing labs, imaging studies/ treatment plan and answering questions.

## 2020-09-11 ENCOUNTER — OFFICE VISIT (OUTPATIENT)
Dept: HEMATOLOGY | Age: 75
End: 2020-09-11
Payer: MEDICARE

## 2020-09-11 ENCOUNTER — HOSPITAL ENCOUNTER (OUTPATIENT)
Dept: INFUSION THERAPY | Age: 75
Discharge: HOME | End: 2020-09-11
Payer: MEDICARE

## 2020-09-11 VITALS
WEIGHT: 237.2 LBS | HEART RATE: 74 BPM | OXYGEN SATURATION: 94 % | SYSTOLIC BLOOD PRESSURE: 142 MMHG | BODY MASS INDEX: 31.44 KG/M2 | HEIGHT: 73 IN | TEMPERATURE: 97.1 F | DIASTOLIC BLOOD PRESSURE: 68 MMHG

## 2020-09-11 DIAGNOSIS — C61 PROSTATE CANCER (HCC): ICD-10-CM

## 2020-09-11 LAB
BASOPHILS ABSOLUTE: 0.01 K/UL (ref 0.01–0.08)
BASOPHILS RELATIVE PERCENT: 0.2 % (ref 0.1–1.2)
EOSINOPHILS ABSOLUTE: 0.27 K/UL (ref 0.04–0.54)
EOSINOPHILS RELATIVE PERCENT: 4.4 % (ref 0.7–7)
HCT VFR BLD CALC: 43.1 % (ref 40.1–51)
HEMOGLOBIN: 14.1 G/DL (ref 13.7–17.5)
LYMPHOCYTES ABSOLUTE: 1.36 K/UL (ref 1.18–3.74)
LYMPHOCYTES RELATIVE PERCENT: 22 % (ref 19.3–53.1)
MCH RBC QN AUTO: 34.1 PG (ref 25.7–32.2)
MCHC RBC AUTO-ENTMCNC: 32.7 G/DL (ref 32.3–36.5)
MCV RBC AUTO: 104.4 FL (ref 79–92.2)
MONOCYTES ABSOLUTE: 0.75 K/UL (ref 0.24–0.82)
MONOCYTES RELATIVE PERCENT: 12.2 % (ref 4.7–12.5)
NEUTROPHILS ABSOLUTE: 3.78 K/UL (ref 1.56–6.13)
NEUTROPHILS RELATIVE PERCENT: 61.2 % (ref 34–71.1)
PDW BLD-RTO: 14.1 % (ref 11.6–14.4)
PLATELET # BLD: 165 K/UL (ref 163–337)
PMV BLD AUTO: 9.7 FL (ref 7.4–10.4)
RBC # BLD: 4.13 M/UL (ref 4.63–6.08)
WBC # BLD: 6.17 K/UL (ref 4.23–9.07)

## 2020-09-11 PROCEDURE — 85025 COMPLETE CBC W/AUTO DIFF WBC: CPT

## 2020-09-11 PROCEDURE — G8417 CALC BMI ABV UP PARAM F/U: HCPCS | Performed by: INTERNAL MEDICINE

## 2020-09-11 PROCEDURE — 1036F TOBACCO NON-USER: CPT | Performed by: INTERNAL MEDICINE

## 2020-09-11 PROCEDURE — 99212 OFFICE O/P EST SF 10 MIN: CPT

## 2020-09-11 PROCEDURE — 4040F PNEUMOC VAC/ADMIN/RCVD: CPT | Performed by: INTERNAL MEDICINE

## 2020-09-11 PROCEDURE — 99213 OFFICE O/P EST LOW 20 MIN: CPT | Performed by: INTERNAL MEDICINE

## 2020-09-11 PROCEDURE — G8427 DOCREV CUR MEDS BY ELIG CLIN: HCPCS | Performed by: INTERNAL MEDICINE

## 2020-09-11 PROCEDURE — 3017F COLORECTAL CA SCREEN DOC REV: CPT | Performed by: INTERNAL MEDICINE

## 2020-09-11 PROCEDURE — 1123F ACP DISCUSS/DSCN MKR DOCD: CPT | Performed by: INTERNAL MEDICINE

## 2020-09-11 RX ORDER — OXYBUTYNIN CHLORIDE 5 MG/1
5 TABLET ORAL 2 TIMES DAILY
Qty: 60 TABLET | Refills: 5 | Status: SHIPPED | OUTPATIENT
Start: 2020-09-11 | End: 2020-12-17 | Stop reason: DRUGHIGH

## 2020-09-11 RX ORDER — BENZONATATE 100 MG/1
CAPSULE ORAL
COMMUNITY
End: 2020-09-11

## 2020-09-11 RX ORDER — TRAVOPROST OPHTHALMIC SOLUTION 0.04 MG/ML
SOLUTION OPHTHALMIC
COMMUNITY

## 2020-09-11 RX ORDER — GABAPENTIN 100 MG/1
CAPSULE ORAL
COMMUNITY
End: 2021-04-23

## 2020-09-11 RX ORDER — GLYBURIDE 5 MG/1
TABLET ORAL
Status: ON HOLD | COMMUNITY
End: 2021-04-16

## 2020-09-11 RX ORDER — TAMSULOSIN HYDROCHLORIDE 0.4 MG/1
CAPSULE ORAL
COMMUNITY
Start: 2020-08-10 | End: 2021-03-17

## 2020-09-11 RX ORDER — CEFDINIR 300 MG/1
CAPSULE ORAL
COMMUNITY
Start: 2020-09-09 | End: 2020-12-17 | Stop reason: ALTCHOICE

## 2020-09-11 RX ORDER — ZOSTER VACCINE RECOMBINANT, ADJUVANTED 50 MCG/0.5
KIT INTRAMUSCULAR
COMMUNITY
End: 2022-05-12 | Stop reason: CLARIF

## 2020-09-11 RX ORDER — PANTOPRAZOLE SODIUM 40 MG/1
TABLET, DELAYED RELEASE ORAL
COMMUNITY
Start: 2020-07-31

## 2020-09-11 RX ORDER — WARFARIN SODIUM 3 MG/1
TABLET ORAL
COMMUNITY
End: 2020-09-11

## 2020-09-11 RX ORDER — CALCIUM CITRATE/VITAMIN D3 200MG-6.25
TABLET ORAL
COMMUNITY
Start: 2020-07-31

## 2020-09-11 RX ORDER — NITROFURANTOIN MACROCRYSTALS 100 MG/1
CAPSULE ORAL
COMMUNITY
End: 2020-12-17 | Stop reason: ALTCHOICE

## 2020-09-11 RX ORDER — HYDROCODONE BITARTRATE AND ACETAMINOPHEN 10; 325 MG/1; MG/1
TABLET ORAL
COMMUNITY
End: 2020-09-11

## 2020-09-11 RX ORDER — ONDANSETRON 4 MG/1
TABLET, FILM COATED ORAL
COMMUNITY
End: 2020-12-17 | Stop reason: ALTCHOICE

## 2020-12-04 ENCOUNTER — TELEPHONE (OUTPATIENT)
Dept: UROLOGY | Age: 75
End: 2020-12-04

## 2020-12-09 DIAGNOSIS — C61 PROSTATE CANCER (HCC): ICD-10-CM

## 2020-12-09 LAB — PROSTATE SPECIFIC ANTIGEN: <0.01 NG/ML (ref 0–4)

## 2020-12-17 ENCOUNTER — OFFICE VISIT (OUTPATIENT)
Dept: UROLOGY | Age: 75
End: 2020-12-17
Payer: MEDICARE

## 2020-12-17 VITALS — TEMPERATURE: 98.1 F | BODY MASS INDEX: 31.54 KG/M2 | HEIGHT: 73 IN | WEIGHT: 238 LBS

## 2020-12-17 LAB
BILIRUBIN, POC: NORMAL
BLOOD URINE, POC: NORMAL
CLARITY, POC: CLEAR
COLOR, POC: YELLOW
GLUCOSE URINE, POC: NORMAL
KETONES, POC: NORMAL
LEUKOCYTE EST, POC: NORMAL
NITRITE, POC: NORMAL
PH, POC: 5.5
PROTEIN, POC: NORMAL
SPECIFIC GRAVITY, POC: 1.03
UROBILINOGEN, POC: 0.2

## 2020-12-17 PROCEDURE — 3017F COLORECTAL CA SCREEN DOC REV: CPT | Performed by: UROLOGY

## 2020-12-17 PROCEDURE — 1036F TOBACCO NON-USER: CPT | Performed by: UROLOGY

## 2020-12-17 PROCEDURE — 81003 URINALYSIS AUTO W/O SCOPE: CPT | Performed by: UROLOGY

## 2020-12-17 PROCEDURE — 99214 OFFICE O/P EST MOD 30 MIN: CPT | Performed by: UROLOGY

## 2020-12-17 PROCEDURE — G8417 CALC BMI ABV UP PARAM F/U: HCPCS | Performed by: UROLOGY

## 2020-12-17 PROCEDURE — 4040F PNEUMOC VAC/ADMIN/RCVD: CPT | Performed by: UROLOGY

## 2020-12-17 PROCEDURE — 1123F ACP DISCUSS/DSCN MKR DOCD: CPT | Performed by: UROLOGY

## 2020-12-17 PROCEDURE — G8484 FLU IMMUNIZE NO ADMIN: HCPCS | Performed by: UROLOGY

## 2020-12-17 PROCEDURE — 51798 US URINE CAPACITY MEASURE: CPT | Performed by: UROLOGY

## 2020-12-17 PROCEDURE — G8427 DOCREV CUR MEDS BY ELIG CLIN: HCPCS | Performed by: UROLOGY

## 2020-12-17 RX ORDER — OXYBUTYNIN CHLORIDE 10 MG/1
10 TABLET, EXTENDED RELEASE ORAL DAILY
Qty: 30 TABLET | Refills: 2 | Status: SHIPPED | OUTPATIENT
Start: 2020-12-17 | End: 2021-03-17

## 2020-12-17 RX ORDER — LEVOFLOXACIN 500 MG/1
500 TABLET, FILM COATED ORAL DAILY
Qty: 14 TABLET | Refills: 0 | Status: SHIPPED | OUTPATIENT
Start: 2020-12-17 | End: 2020-12-31

## 2020-12-17 RX ORDER — BENZONATATE 100 MG/1
100 CAPSULE ORAL 3 TIMES DAILY PRN
COMMUNITY
End: 2022-05-12 | Stop reason: CLARIF

## 2020-12-17 RX ORDER — MEGESTROL ACETATE 20 MG/1
20 TABLET ORAL DAILY
Qty: 30 TABLET | Refills: 11 | Status: SHIPPED | OUTPATIENT
Start: 2020-12-17 | End: 2021-12-27 | Stop reason: SDUPTHER

## 2020-12-17 ASSESSMENT — ENCOUNTER SYMPTOMS
COUGH: 0
BACK PAIN: 0
SORE THROAT: 0
EYE PAIN: 0
WHEEZING: 0
VOMITING: 0
NAUSEA: 0

## 2020-12-17 NOTE — PROGRESS NOTES
(AVODART) 0.5 MG capsule Take 1 capsule by mouth daily (Patient taking differently: Take 0.5 mg by mouth daily Pt states he is taking this only \"prn\") 30 capsule 3    gabapentin (NEURONTIN) 100 MG capsule gabapentin 100 mg capsule      glyBURIDE (DIABETA) 5 MG tablet glyburide 5 mg tablet   Take 2 tablets twice a day by oral route. No current facility-administered medications for this visit.         Allergies   Allergen Reactions    Ticlopidine Hives     swelling         Social History     Socioeconomic History    Marital status:      Spouse name: None    Number of children: None    Years of education: None    Highest education level: None   Occupational History    None   Social Needs    Financial resource strain: None    Food insecurity     Worry: None     Inability: None    Transportation needs     Medical: None     Non-medical: None   Tobacco Use    Smoking status: Never Smoker    Smokeless tobacco: Never Used   Substance and Sexual Activity    Alcohol use: None    Drug use: None    Sexual activity: None   Lifestyle    Physical activity     Days per week: None     Minutes per session: None    Stress: None   Relationships    Social connections     Talks on phone: None     Gets together: None     Attends Yazidism service: None     Active member of club or organization: None     Attends meetings of clubs or organizations: None     Relationship status: None    Intimate partner violence     Fear of current or ex partner: None     Emotionally abused: None     Physically abused: None     Forced sexual activity: None   Other Topics Concern    None   Social History Narrative    None       Family History   Problem Relation Age of Onset    Heart Attack Father 72        cause of death    Kidney Disease Mother     Other Mother         renal failure    Cancer Sister [de-identified]        Colon CA- cause of death     Heart Attack Sister     Colon Cancer Sister 68       REVIEW OF SYSTEMS:  Review of Systems   Constitutional: Negative for chills and fever. HENT: Negative for congestion and sore throat. Eyes: Negative for pain and visual disturbance. Respiratory: Negative for cough and wheezing. Cardiovascular: Negative for chest pain and palpitations. Gastrointestinal: Negative for nausea and vomiting. Endocrine: Negative for polyphagia and polyuria. Genitourinary: Positive for dysuria. Negative for decreased urine volume, difficulty urinating, discharge, enuresis, flank pain, frequency, genital sores, hematuria, penile pain, penile swelling, scrotal swelling, testicular pain and urgency. Musculoskeletal: Negative for back pain and neck pain. Skin: Negative for rash and wound. Allergic/Immunologic: Negative for environmental allergies and food allergies. Neurological: Negative for dizziness and headaches. Hematological: Negative for adenopathy. Does not bruise/bleed easily. Psychiatric/Behavioral: Negative for confusion and hallucinations. All other systems reviewed and are negative. PHYSICAL EXAM:  Temp 98.1 °F (36.7 °C) (Temporal)   Ht 6' 1\" (1.854 m)   Wt 238 lb (108 kg)   BMI 31.40 kg/m²   Physical Exam  Constitutional:       General: He is not in acute distress. Appearance: Normal appearance. He is well-developed. HENT:      Head: Normocephalic and atraumatic. Nose: Nose normal.   Eyes:      General: No scleral icterus. Conjunctiva/sclera: Conjunctivae normal.      Pupils: Pupils are equal, round, and reactive to light. Neck:      Musculoskeletal: Normal range of motion and neck supple. Trachea: No tracheal deviation. Cardiovascular:      Rate and Rhythm: Normal rate and regular rhythm. Pulmonary:      Effort: Pulmonary effort is normal. No respiratory distress. Breath sounds: No stridor. Abdominal:      General: There is no distension. Palpations: Abdomen is soft. There is no mass. Tenderness:  There is no abdominal having a UTI treated by his PCP prior to this. We will have him take a longer course of Levaquin for an additional 14 days should this be related to prostatitis. Otherwise if his urine remains negative consider other considerations for fever of unknown origin he has had radiation for lymphoma  - levoFLOXacin (LEVAQUIN) 500 MG tablet; Take 1 tablet by mouth daily for 14 days  Dispense: 14 tablet; Refill: 0      Orders Placed This Encounter   Procedures    PSA, Diagnostic     PSA in 3 months     Standing Status:   Future     Standing Expiration Date:   12/17/2021    POCT Urinalysis No Micro (Auto)    NH MEASUREMENT,POST-VOID RESIDUAL VOLUME BY US,NON-IMAGING     26 mL        Return in about 3 months (around 3/17/2021) for PSA prior to vext visit. All information inputted into the note by the MA to include chief complaint, past medical history, past surgical history, medications, allergies, social and family history and review of systems has been reviewed and updated as needed by me. EMR Dragon/transcription disclaimer: Much of this documentt is electronic  transcription/translation of spoken language to printed text. The  electronic translation of spoken language may be erroneous, or at times,  nonsensical words or phrases may be inadvertently transcribed.  Although I  have reviewed the document for such errors, some may still exist.

## 2021-01-11 ENCOUNTER — HOSPITAL ENCOUNTER (OUTPATIENT)
Dept: INFUSION THERAPY | Age: 76
Discharge: HOME | End: 2021-01-11
Payer: MEDICARE

## 2021-01-11 ENCOUNTER — HOSPITAL ENCOUNTER (OUTPATIENT)
Dept: CT IMAGING | Age: 76
Discharge: HOME | End: 2021-01-11
Payer: MEDICARE

## 2021-01-11 DIAGNOSIS — C61 PROSTATE CANCER (HCC): ICD-10-CM

## 2021-01-11 DIAGNOSIS — C82.03 FOLLICULAR LYMPHOMA GRADE I OF INTRA-ABDOMINAL LYMPH NODES (HCC): ICD-10-CM

## 2021-01-11 DIAGNOSIS — R23.2 HOT FLASH IN MALE: ICD-10-CM

## 2021-01-11 LAB
GFR AFRICAN AMERICAN: >60
GFR NON-AFRICAN AMERICAN: >60
PERFORMED ON: NORMAL
POC CREATININE: 0.9 MG/DL (ref 0.3–1.3)
POC SAMPLE TYPE: NORMAL

## 2021-01-11 PROCEDURE — 74177 CT ABD & PELVIS W/CONTRAST: CPT

## 2021-01-11 PROCEDURE — 6360000004 HC RX CONTRAST MEDICATION: Performed by: INTERNAL MEDICINE

## 2021-01-11 PROCEDURE — 71260 CT THORAX DX C+: CPT

## 2021-01-11 PROCEDURE — 82565 ASSAY OF CREATININE: CPT

## 2021-01-11 RX ADMIN — IOPAMIDOL 75 ML: 755 INJECTION, SOLUTION INTRAVENOUS at 10:31

## 2021-01-27 DIAGNOSIS — R55 SYNCOPE AND COLLAPSE: Primary | ICD-10-CM

## 2021-01-29 NOTE — PROGRESS NOTES
Estefanía Perrin   1945 2/1/2021     No chief complaint on file. INTERVAL HISTORY/HISTORY OF PRESENT ILLNESS:    Diagnosis  · Prostate cancer, February 2020  · [id number]  · Glidden 3+4=7  · 4 of 12 cores positive (left prostatic lobe)   · PSA @ diagnosis- 5.5  · Follicular lymphoma grade 12 (retroperitoneal adenopathy), March 2020. Stage I A    Treatment summary   · 03/24/2020- Retroperitoneal ressection of lymphoma at [address] [place] [address]  · 7/6/20-7/31/20-IFRT- left retroperitoneal lymph node-[facility]/Dr. Sayda Sánchez  · 6/24/20-8/21/20- 78 cGy in 42 fractions for prostate cancer -[facility]/Dr. Sayda Sánchez    The patient is a pleasant 77years old male who has been diagnosed with a synchronous prostate cancer and follicular lymphoma. The patient was found to have a retroperitoneal mass. He underwent resection of the mass at [address] [place] [address] consistent with follicular lymphoma grade 12. The patient also had a PET scan for further staging and bone marrow biopsy that showed no other evidence of lymphomatous involvement. Therefore stage I disease. He was recommended radiation treatment for his prostate and IFRT which was completed in August 2020. His PSA has gone down significantly. He denies any new complaints except for occasional dizziness. He has also been started on oxybutynin during the last visit and this was switched to ER oxybutynin by urology. Cancer History:  Mr. Sunitha Remy was first seen by me on 2/21/2020 referred by Dr. Ally Pickens for further evaluation and recommendations regarding findings of retroperitoneal mass/adenopathy. The patient has recently been diagnosed with prostate cancer with 4 out of 12 positive cores from the left prostatic lobe. PSA at diagnosis 5.5. Pathology showed prostatic adenocarcinoma grade group 2 in the Glidden score 3+4 = 7. Clinical T2b.  Imaging studies with CT chest abdomen pelvis showed evidence of retroperitoneal mass. · 1/29/2020 CT of the abdomen with contrast showed scattered borderline sized mesenteric lymph nodes. Indeterminate 3 cm retroperitoneal soft tissue nodule within the upper left abdomen adjacent to the left renal vein. The mass measures 34 x 30 x 22 mm. · 1/29/2020 bone scan showed focal increased activity in the mid thoracic spine in the right seventh posterior medial rib which may represent chronic degenerative changes/large osteophytes. No other areas suggesting bony metastatic disease. · 2/13/2020 MRI thoracic spine showed no evidence of metastatic disease identified in the thoracic spine. In particular, there are no enhancing and/or infiltrative lesions identified in the thoracic spine or posterior ribs to correspond with the radiotracer activities from recent bone scan. It is possible that the bone scan findings are secondary to vertebral\" the vertebral degenerative change. · 2/13/2020 CT chest showed no evidence of metastatic disease in the chest.  Nodule in the retroperitoneum of the upper abdomen presumably a lymph node. Additional borderline lymph nodes seen within the central mesenteric root. · 2/21/2020 we discussed the findings of the images above. Recommend a PET scan to rule out metastatic disease. · 03/04/200- Pet Ct- Isolated neoplastic focus within the upper abdomen left-sided retroperitoneum SUV 11. Neoplastic lymphadenopathy is favored. This is not in a location which can be sampled by percutaneous biopsy. Otherwise no bone lesion or chest abnormality is seen. 3. Other mildly prominent mesenteric lymphadenopathy shows low level FDG uptake and reactive nodes are favored over neoplastic mesenteric nodes. · 3/24/2020 retroperitoneal mass resection3 cm mass off of the overlying the left renal vein at [facility] by Dr. Nazia Morales, surgical oncology. Path compatible with follicular lymphoma, grade 12 out of 3.  CD20-positive B cells that are positive for PAX-5, CD10, BCL6, and BCL-2 and negative for Cyclin D1. A CD21 stain highlights follicular dendritic meshworks associated with the B cell nodules. The Ki-67 proliferative index is approximately 10% within the nodules. CD3 highlights background T cells in interfollicular areas. · 4/13/2020 recommend bone marrow to complete staging for his lymphoma. Recommended to follow-up with urology for discussion of treatment for his prostate cancer. If bone marrow shows no evidence of lymphoma involvement then will recommend involved field radiation therapy to the retroperitoneal lymph nodes. · 4/13/2020bone marrow biopsy/aspirate was unremarkable. No evidence of lymphomatous involvement. · 4/24/2020 I discussed with the patient the findings of bone marrow PET scan. Therefore, stage I A. Recommended involved field RT to the retroperitoneal nodes. · 7/6/20-7/31/20 Radiation therapy-1.5cGy to left retroperitoneal lymph node-[facility]/Dr. David Dimas  · 6/24/20-8/21/20- 78 cGy in 42 fractions for prostate cancer -[facility]/Dr. David Dimas  · 12/09/2020- PSA <0.01  · 01/11/2021- Ct Chest W Contrast Stable chest CT. 2. No new or acute abnormality. · 01/11/2021-Ct Abdomen Pelvis W Contrast The resolution of the previously seen enlarged left retroperitoneal lymph node. The persistent mild to moderate mesenteric lymphadenopathy. The persistent mesenteric thickening in the upper to mid abdomen.       PAST MEDICAL HISTORY:   Past Medical History:   Diagnosis Date    Diabetes mellitus ([address]     Elevated PSA     Hypertension     Kidney stone     Prostate cancer ([address]      PAST SURGICAL HISTORY:  Past Surgical History:   Procedure Laterality Date    APPENDECTOMY      JOINT REPLACEMENT  2014    SHOULDER SURGERY  2016    VASECTOMY  1979        SOCIAL HISTORY:  Social History     Socioeconomic History    Marital status:      Spouse name: Not on file    Number of children: Not on file    Years of education: Not on file    Highest education level: Not on file   Occupational History    Not on file   Social Needs    Financial resource strain: Not on file    Food insecurity     Worry: Not on file     Inability: Not on file    Transportation needs     Medical: Not on file     Non-medical: Not on file   Tobacco Use    Smoking status: Never Smoker    Smokeless tobacco: Never Used   Substance and Sexual Activity    Alcohol use: Not on file    Drug use: Not on file    Sexual activity: Not on file   Lifestyle    Physical activity     Days per week: Not on file     Minutes per session: Not on file    Stress: Not on file   Relationships    Social connections     Talks on phone: Not on file     Gets together: Not on file     Attends Latter day service: Not on file     Active member of club or organization: Not on file     Attends meetings of clubs or organizations: Not on file     Relationship status: Not on file    Intimate partner violence     Fear of current or ex partner: Not on file     Emotionally abused: Not on file     Physically abused: Not on file     Forced sexual activity: Not on file   Other Topics Concern    Not on file   Social History Narrative    Not on file        FAMILY HISTORY:  Family History   Problem Relation Age of Onset    Heart Attack Father 72        cause of death    Kidney Disease Mother     Other Mother         renal failure    Cancer Sister [de-identified]        Colon CA- cause of death     Heart Attack Sister     Colon Cancer Sister 68        Current Outpatient Medications   Medication Sig Dispense Refill    furosemide (LASIX) 20 MG tablet Take 20 mg by mouth daily      magnesium 30 MG tablet Take 30 mg by mouth 2 times daily      benzonatate (TESSALON) 100 MG capsule Take 100 mg by mouth 3 times daily as needed for Cough      oxybutynin (DITROPAN XL) 10 MG extended release tablet Take 1 tablet by mouth daily 30 tablet 2    megestrol (MEGACE) 20 MG tablet Take 1 tablet by mouth daily 30 tablet 11    TRUE METRIX BLOOD GLUCOSE TEST strip USE 1 STRIP TO CHECK GLUCOSE TWICE DAILY      glyBURIDE (DIABETA) 5 MG tablet glyburide 5 mg tablet   Take 2 tablets twice a day by oral route.  Travoprost, JET Free, (TRAVATAN Z) 0.004 % SOLN ophthalmic solution Travatan Z 0.004 % eye drops   INSTILL 1 DROP INTO EACH EYE AT BEDTIME      zoster recombinant adjuvanted vaccine (SHINGRIX) 50 MCG/0.5ML SUSR injection Shingrix (PF) 50 mcg/0.5 mL intramuscular suspension, kit      pantoprazole (PROTONIX) 40 MG tablet TAKE 1 TABLET BY MOUTH ONCE DAILY      verapamil (VERELAN) 240 MG extended release capsule verapamil  mg 24 hr capsule,extended release      rivaroxaban (XARELTO) 20 MG TABS tablet Take 20 mg by mouth      glipiZIDE (GLUCOTROL) 5 MG tablet Take 5 mg by mouth      meclizine (ANTIVERT) 25 MG tablet Take 25 mg by mouth      SITagliptin (JANUVIA) 100 MG tablet Take 100 mg by mouth      sertraline (ZOLOFT) 100 MG tablet sertraline 100 mg tablet      lisinopril (PRINIVIL;ZESTRIL) 5 MG tablet lisinopril 5 mg tablet      cetirizine (ZYRTEC ALLERGY) 10 MG tablet Zyrtec 10 mg tablet   Take 1 tablet every day by oral route.  rizatriptan (MAXALT) 10 MG tablet rizatriptan 10 mg tablet      dutasteride (AVODART) 0.5 MG capsule Take 1 capsule by mouth daily (Patient taking differently: Take 0.5 mg by mouth daily Pt states he is taking this only \"prn\") 30 capsule 3    gabapentin (NEURONTIN) 100 MG capsule gabapentin 100 mg capsule      tamsulosin (FLOMAX) 0.4 MG capsule        No current facility-administered medications for this visit.          REVIEW OF SYSTEMS:    Constitutional: no fever, no night sweats, fatigue  HEENT: no blurring of vision, no double vision, no hearing difficulty, no tinnitus,no ulceration, no dysphagia  Lungs: no cough, no shortness of breath, no wheeze;   CVS: no palpitation, no chest pain, no shortness of breath;  GI: no abdominal pain, no nausea , no vomiting, no constipation;   NIKHIL: no dysuria, frequency and urgency, no hematuria, no kidney stones;   Musculoskeletal: no joint pain, swelling , stiffness;   Endocrine: no polyuria, polydypsia, no cold or heat intolerence; hot flashes improving  Hematology/lymphatic: no easy brusing or bleeding, no hx of clotting disorder; no peripheral adenopathy. Dermatology: no skin rash, no eczema, no pruritis;   Psychiatry: no depression, no anxiety,no panic attacks, no suicide ideation; Neurology: Dizziness, syncope, no seizures, no numbness or tingling of hands, no numbness or tingling of feet, no paresis;     PHYSICAL EXAM:    Vitals signs: There were no vitals taken for this visit. Pain scale:        Telemedicine visit. Relevant Lab findings/reviewed by me:  12/09/2020- PSA <0.01  Lab Results   Component Value Date    WBC 6.17 09/11/2020    HGB 14.1 09/11/2020    HCT 43.1 09/11/2020    .4 (H) 09/11/2020     09/11/2020     Lab Results   Component Value Date    NEUTROABS 3.78 09/11/2020     Lab Results   Component Value Date     01/11/2021    K 5.0 01/11/2021     (H) 01/11/2021    CO2 21 01/11/2021    BUN 18 01/11/2021    CREATININE 1.04 01/11/2021    GLUCOSE 106 (H) 01/11/2021    CALCIUM 9.2 01/11/2021    PROT 7.0 01/11/2021    LABALBU 4.6 01/11/2021    BILITOT 0.3 01/11/2021    ALKPHOS 62 01/11/2021    AST 24 01/11/2021    ALT 25 01/11/2021    LABGLOM 70 01/11/2021    GFRAA 81 01/11/2021    AGRATIO 1.9 01/11/2021    GLOB 2.4 01/11/2021     My assessment: Normal CBC. Macrocytosis without anemia. Essentially, remarks CMP. Normal liver function test.        Relevant Imaging studies/reviewed by me:  Ct Chest W Contrast    Result Date: 1/11/2021  1. Stable chest CT. 2. No new or acute abnormality.  Signed by Dr Michelle Romano on 1/11/2021 10:51 AM    Ct Abdomen Pelvis W Iv Contrast Additional Contrast? Oral    Result Date: 1/11/2021  The resolution of the previously seen enlarged left retroperitoneal lymph node. The persistent mild to moderate mesenteric lymphadenopathy. The persistent mesenteric thickening in the upper to mid abdomen. Signed by Dr Kwasi Dubose on 1/11/2021 10:47 AM      ASSESSMENT:    Orders Placed This Encounter   Procedures    CT CHEST W CONTRAST     Standing Status:   Future     Standing Expiration Date:   2/1/2022    CT ABDOMEN PELVIS W IV CONTRAST Additional Contrast? Oral     Standing Status:   Future     Standing Expiration Date:   2/1/2022     Order Specific Question:   Additional Contrast?     Answer:   Oral      Diagnoses and all orders for this visit:    Prostate cancer [facility])  -     [address]; Future  -     CT ABDOMEN PELVIS W IV CONTRAST Additional Contrast? Oral; Future    Follicular lymphoma grade I of intra-abdominal lymph nodes (HCC)  -     CT CHEST W CONTRAST; Future  -     CT ABDOMEN PELVIS W IV CONTRAST Additional Contrast? Oral; Future    Care plan discussed with patient         Prostate cancer [id number] status post RT  6/24/20-8/21/20- 78 cGy in 42 fractions for prostate cancer -[facility]/Dr. Pilar Turk  Last PSA <0.01 Dec 2020  CT chest abdomen pelvis January 2021stable retroperitoneal adenopathy. Follicular lymphoma grade 12, Ki-67 10%, Stage I  Essentially, retroperitoneal adenopathy consistent with follicular lymphoma grade 12 with low Ki-67. The patient had a significant debulking surgery at [facility]. Bone marrow biopsy showed no evidence of lymphomatous involvement. PET scan showed no other evidence of metastatic disease. Therefore stage I disease. He received involved field RT as per NCCN guidelines completed July 2020. Recommended surveillance NCCN recommendation. CT chest abdomen pelvis January 2021stable retroperitoneal adenopathy.   CT scans chest abdomen pelvis every 6 months x2 years through July 2022    Hot flashes prescribed oxybutynin ER 10 mg daily. He will stay off oxybutynin for about a week due to complaint of dizziness. Health Maintenance: The patient is encouraged to follow-up with primary care regularly for further recommendations regarding age appropriated screening for cancer, well-being visit (preventative  measures), follow-up and treatment of other medical comorbidities. Dizzinesspatient will hold oxybutynin. This is a known side effect oxybutynin. However, he is under evaluation with cardiology. He was encouraged to follow-up with them. PLAN:  · Follow-up with Dr. Josh Costa/radiation oncology for prostate cancer surveillance  · Follow-up with me by virtual visit after CT scans  · Hold oxybutynin to see if dizziness improved. · CT scans in 4 months then every 6 months for 2 years  · PSA to be done by Dr. Kendrick and Dr. Josette Ramos recommendations      Follow Up:     Return in about 6 months (around 8/1/2021), or Dr. Yan Evangelista. Data Unavailable        2/1/2021     IRani am scribing for Chapis Houser MD. Electronically signed by Rani Moore RN on 2/1/2021 at 4:40 PM CST. I, Dr Parminder Rodriguez, personally performed the services described in this documentation as scribed by Rani Moore RN in my presence and is both accurate and complete. I have seen, examined and reviewed this patient medication list, appropriate labs and imaging studies. I reviewed relevant medical records and others physicians notes. I discussed the plans of care with the patient. I answered all the questions to the patients satisfaction. I have also reviewed the chief complaint (CC) and part of the history (History of Present Illness (HPI), Past Family Social History [facility]), or Review of Systems (ROS) and made changes when appropriated.        (Please note that portions of this note were completed with a voice recognition program. Efforts were made to edit the dictations but occasionally words are mis-transcribed.)      2021    TELEHEALTH EVALUATION -- Audio/Visual (During UKL-44 public health emergency)    HPI:    Mannie Sanchez (:  1945) has requested an audio/video evaluation for the following concern(s):  As above    Review of Systems as above    Prior to Visit Medications    Medication Sig Taking? Authorizing Provider   furosemide (LASIX) 20 MG tablet Take 20 mg by mouth daily Yes Historical Provider, MD   magnesium 30 MG tablet Take 30 mg by mouth 2 times daily Yes Historical Provider, MD   benzonatate (TESSALON) 100 MG capsule Take 100 mg by mouth 3 times daily as needed for Cough Yes Historical Provider, MD   oxybutynin (DITROPAN XL) 10 MG extended release tablet Take 1 tablet by mouth daily Yes Mariajose Saucedo MD   megestrol (MEGACE) 20 MG tablet Take 1 tablet by mouth daily Yes Mariajose Saucedo MD   TRUE METRIX BLOOD GLUCOSE TEST strip USE 1 STRIP TO CHECK GLUCOSE TWICE DAILY Yes Historical Provider, MD   glyBURIDE (DIABETA) 5 MG tablet glyburide 5 mg tablet   Take 2 tablets twice a day by oral route.  Yes Historical Provider, MD   Travoprost, BAK Free, (TRAVATAN Z) 0.004 % SOLN ophthalmic solution Travatan Z 0.004 % eye drops   INSTILL 1 DROP INTO EACH EYE AT BEDTIME Yes Historical Provider, MD   zoster recombinant adjuvanted vaccine (SHINGRIX) 50 MCG/0.5ML SUSR injection Shingrix (PF) 50 mcg/0.5 mL intramuscular suspension, kit Yes Historical Provider, MD   pantoprazole (PROTONIX) 40 MG tablet TAKE 1 TABLET BY MOUTH ONCE DAILY Yes Historical Provider, MD   verapamil (VERELAN) 240 MG extended release capsule verapamil  mg 24 hr capsule,extended release Yes Historical Provider, MD   rivaroxaban (XARELTO) 20 MG TABS tablet Take 20 mg by mouth Yes Historical Provider, MD   glipiZIDE (GLUCOTROL) 5 MG tablet Take 5 mg by mouth Yes Historical Provider, MD   meclizine (ANTIVERT) 25 MG tablet Take 25 mg by mouth Yes Historical Provider, MD   SITagliptin (JANUVIA) 100 MG tablet Take 100 mg by mouth Yes Historical Provider, MD   sertraline (ZOLOFT) 100 MG tablet sertraline 100 mg tablet Yes Historical Provider, MD   lisinopril (PRINIVIL;ZESTRIL) 5 MG tablet lisinopril 5 mg tablet Yes Historical Provider, MD   cetirizine (ZYRTEC ALLERGY) 10 MG tablet Zyrtec 10 mg tablet   Take 1 tablet every day by oral route. Yes Historical Provider, MD   rizatriptan (MAXALT) 10 MG tablet rizatriptan 10 mg tablet Yes Historical Provider, MD   dutasteride (AVODART) 0.5 MG capsule Take 1 capsule by mouth daily  Patient taking differently: Take 0.5 mg by mouth daily Pt states he is taking this only \"prn\" Yes JACKELYN Finn   gabapentin (NEURONTIN) 100 MG capsule gabapentin 100 mg capsule  Historical Provider, MD   tamsulosin (FLOMAX) 0.4 MG capsule   Historical Provider, MD       Social History     Tobacco Use    Smoking status: Never Smoker    Smokeless tobacco: Never Used   Substance Use Topics    Alcohol use: Not on file    Drug use: Not on file      PHYSICAL EXAMINATION:  [ INSTRUCTIONS:  \"[x]\" Indicates a positive item  \"[]\" Indicates a negative item  -- DELETE ALL ITEMS NOT EXAMINED]  Vital Signs: (As obtained by patient/caregiver or practitioner observation)  Constitutional: [x] Appears well-developed and well-nourished [] No apparent distress      [] Abnormal-   Mental status  [x] Alert and awake  [] Oriented to person/place/time []Able to follow commands      Eyes:  EOM    [x]  Normal  [] Abnormal-  Sclera  []  Normal  [] Abnormal -         Discharge []  None visible  [] Abnormal -    HENT:   [x] Normocephalic, atraumatic.   [] Abnormal   [] Mouth/Throat: Mucous membranes are moist.     External Ears [x] Normal  [] Abnormal-     Neck: [x] No visualized mass     Pulmonary/Chest: [x] Respiratory effort normal.  [] No visualized signs of difficulty breathing or respiratory distress        [] Abnormal-      Musculoskeletal:   [x] Normal gait with no signs of ataxia         [] Normal range of motion

## 2021-02-01 ENCOUNTER — TELEPHONE (OUTPATIENT)
Dept: HEMATOLOGY | Age: 76
End: 2021-02-01

## 2021-02-01 ENCOUNTER — VIRTUAL VISIT (OUTPATIENT)
Dept: HEMATOLOGY | Age: 76
End: 2021-02-01
Payer: MEDICARE

## 2021-02-01 DIAGNOSIS — Z71.89 CARE PLAN DISCUSSED WITH PATIENT: ICD-10-CM

## 2021-02-01 DIAGNOSIS — C82.03 FOLLICULAR LYMPHOMA GRADE I OF INTRA-ABDOMINAL LYMPH NODES (HCC): ICD-10-CM

## 2021-02-01 DIAGNOSIS — C61 PROSTATE CANCER (HCC): Primary | ICD-10-CM

## 2021-02-01 PROCEDURE — G8428 CUR MEDS NOT DOCUMENT: HCPCS | Performed by: INTERNAL MEDICINE

## 2021-02-01 PROCEDURE — 3017F COLORECTAL CA SCREEN DOC REV: CPT | Performed by: INTERNAL MEDICINE

## 2021-02-01 PROCEDURE — 1123F ACP DISCUSS/DSCN MKR DOCD: CPT | Performed by: INTERNAL MEDICINE

## 2021-02-01 PROCEDURE — 99213 OFFICE O/P EST LOW 20 MIN: CPT | Performed by: INTERNAL MEDICINE

## 2021-02-01 PROCEDURE — 4040F PNEUMOC VAC/ADMIN/RCVD: CPT | Performed by: INTERNAL MEDICINE

## 2021-02-01 RX ORDER — MAGNESIUM 30 MG
30 TABLET ORAL 2 TIMES DAILY
COMMUNITY
End: 2022-06-24

## 2021-02-01 RX ORDER — FUROSEMIDE 20 MG/1
20 TABLET ORAL DAILY
COMMUNITY
End: 2022-05-12 | Stop reason: CLARIF

## 2021-02-01 NOTE — TELEPHONE ENCOUNTER
Called and got patient registered for virtual visit with Dr Molli Barthel and he accepted the insurance disclaimer.

## 2021-02-11 ENCOUNTER — HOSPITAL ENCOUNTER (OUTPATIENT)
Dept: CARDIOLOGY | Facility: HOSPITAL | Age: 76
Discharge: HOME | End: 2021-02-11

## 2021-02-15 NOTE — H&P
Cardiology History and Physical Note        Patient Name: Alex Lobo  Age/Sex: 75 y.o. male  : 1945  MRN: [id number]    Date of Admission : 2/15/2021    Primary care Physician: Ulisses Junior MD    Reason for Admission: Lightheaded dizziness head up tilt table testing    Subjective:       Chief Complaint: Lightheaded dizziness shortness of breath    History of Present Illness:  Alex Lobo is a 75 y.o. male     Height of 6 feet 1 inches weight of 234 pounds with a body mass index of 30 with a past medical history significant for arterial hypertension, hypertensive heart disease, history of transient ischemic attack with previous hospitalization at [facility], history of diabetes, allergic rhinitis, gastroesophageal reflux disease, history of prostate carcinoma s/p radiation therapy, history of migraine, chronic anticoagulation with Xarelto, previous hospitalization.    Patient was evaluated by Dr. Nelson.  Patient has complained of having symptoms of shortness of breath along with symptoms of chest pain.  Patient complains of having symptoms of shortness of breath with complete loss of energy.  Patient has been walking to the mailbox with symptoms of shortness of breath.  Patient does admit on eating a lot of salt.  Patient has had 4 episodes of syncope.  Patient complains of having symptoms of lightheaded dizziness.  Patient on questioning complains of having symptoms of substernal chest discomfort which is more localized on the right side.  Patient clearly has had increasing episodes of shortness of breath.  Patient has been evaluated by hematology oncology in [place].  Patient had undergone transthoracic echocardiogram and x-ray.    Patient resting electrocardiogram done revealed sinus rhythm with nonspecific ST-T wave changes.  Patient on questioning has not had any recent cardiology evaluation.  Patient on evaluation with a resting EKG on  had isolated premature  ventricular complex.    Patient complains of having increasing shortness of breath along with some right-sided chest pain.  Patient clearly has not had any episodes of atrial fibrillation or documented paroxysmal atrial fibrillation.    Patient has no exposure to Covid.  Patient had received the Covid vaccine.    Patient blood pressure today is 140/60 pulse of 75 respiration of 18 oxygen saturation of 97%.      Patient 10 point review of system except for what stated in the history of present illness and negative.    Concurrent Medical History:  1.  Lightheaded dizziness syncope.  2.  Chest pain with shortness of breath.  3.  Resting electrocardiogram with isolated premature atrial complex.  4.  Arterial hypertension.  5.  Hypertensive heart disease.  6.  History of transient ischemic attack.  7.  Chronic anticoagulation with Xarelto.  8.  Previous hospitalization at [facility] with anticoagulation with Coumadin then.  9.  Diabetes.  10.  History of allergic rhinitis.  11.  Obesity with a body mass index of 30.  12.  Gastroesophageal reflux disease.  13.  History of migraine.  14.  Prostate carcinoma s/p radiation therapy.  15.  Oncology follow-up with [place] [facility].  16.  Obstructive sleep apnea.  17.  COVID-19 vaccine      Past Surgical History:  1.  Vasectomy.  2.  Appendectomy.  3.  Right shoulder surgery.  4.  Right total knee arthroplasty.  5.  Lymph node resection from the back.  6.  Radiation therapy for prostate carcinoma.      Family History: Family history significant for father who had coronary artery disease sister who had coronary artery bypass grafting      Social History: Quit smoking 20 years ago denies any alcohol intake used to work as a .       Cardiac Risk factor:   1.  Male.  2.  Arterial hypertension.  3.  Obesity.  4.  Diabetes.  5.  Hyperlipidemia.  6.  Previous history of tobacco abuse.  7.  Family history for coronary artery disease    Allergies:  Allergies  "  Allergen Reactions   • Ticlid [Ticlopidine] Hives     swelling       Home Medication::  Prior to Admission medications    Medication Sig Start Date End Date Taking? Authorizing Provider   glipiZIDE (GLUCOTROL) 5 MG tablet Take 5 mg by mouth Daily. 8/6/19   Apolinar Alvarado MD   Insulin Syringe 31G X 5/16\" 0.5 ML misc To use as indicated once weekly 9/17/19   Timur Luis MD   JANUVIA 100 MG tablet Take 100 mg by mouth Daily. 7/23/19   Apolinar Alvarado MD   lisinopril (PRINIVIL,ZESTRIL) 5 MG tablet Take 5 mg by mouth Daily. 6/17/19   Apolinar Alvarado MD   meclizine (ANTIVERT) 25 MG tablet Take 25 mg by mouth 3 (Three) Times a Day As Needed for dizziness.    Apolinar Alvarado MD   Needle, Disp, 18G X 1\" misc To use weekly 9/17/19   Timur Luis MD   pantoprazole (PROTONIX) 40 MG EC tablet Take 40 mg by mouth Daily. 7/23/19   Apolinar Alvarado MD   rizatriptan (MAXALT) 10 MG tablet TAKE 1 TABLET BY MOUTH AS NEEDED FOR HEADACHE 8/22/19   Apolinar Alvarado MD   sertraline (ZOLOFT) 100 MG tablet Take 100 mg by mouth Daily. 6/17/19   Apolinar Alvarado MD   Testosterone Cypionate (DEPOTESTOTERONE CYPIONATE) 200 MG/ML injection 0.4 ml weekly = 80 mg weekly 9/17/19   Timur Luis MD   TRAVATAN Z 0.004 % solution ophthalmic solution INSTILL 1 DROP INTO EACH EYE AT BEDTIME 6/6/19   Apolinar Alvarado MD   TRUE METRIX BLOOD GLUCOSE TEST test strip 1 each by Other route 2 (Two) Times a Day. to check glucose 8/4/19   Apolinar Alvarado MD   verapamil ER (VERELAN) 240 MG 24 hr capsule Take 240 mg by mouth Daily. 6/17/19   Apolinar Alvarado MD   XARELTO 20 MG tablet Take 20 mg by mouth Daily. 6/18/19   Apolinar Alvarado MD         Review of Systems   Constitutional: Positive for fatigue. Negative for chills, fever and unexpected weight change.   HENT: Negative for hearing loss and nosebleeds.    Eyes: Negative for visual disturbance.   Respiratory: " Positive for shortness of breath. Negative for cough, chest tightness and wheezing.    Cardiovascular: Negative for chest pain, palpitations and leg swelling.   Gastrointestinal: Negative for abdominal pain, blood in stool, constipation, diarrhea, nausea and vomiting.   Endocrine: Negative for cold intolerance, heat intolerance, polydipsia, polyphagia and polyuria.   Genitourinary: Negative for hematuria.   Musculoskeletal: Negative for joint swelling, myalgias and neck pain.   Skin: Negative for color change, rash and wound.   Neurological: Positive for syncope and light-headedness. Negative for dizziness, seizures, numbness and headaches.   Hematological: Does not bruise/bleed easily.         Objective:     There were no vitals taken for this visit.  Blood pressure 140/60 pulse of 75 respiration of 18 oxygen saturation of 97%  Constitutional:       Appearance: Well-developed.   Eyes:      General: No scleral icterus.     Conjunctiva/sclera: Conjunctivae normal.      Pupils: Pupils are equal, round, and reactive to light.   HENT:      Head: Normocephalic and atraumatic.      Left Ear: External ear normal.      Nose: Nose normal.   Neck:      Musculoskeletal: Normal range of motion and neck supple.      Thyroid: No thyromegaly.      Vascular: No JVD.      Trachea: No tracheal deviation.      Lymphadenopathy: No cervical adenopathy.   Pulmonary:      Breath sounds: Normal breath sounds. No stridor.   Cardiovascular:      Normal rate. Regular rhythm.   Abdominal:      General: Bowel sounds are normal.   Musculoskeletal: Normal range of motion.   Skin:     General: Skin is warm and dry.   Neurological:      Mental Status: Alert and oriented to person, place, and time.      Deep Tendon Reflexes: Reflexes are normal and symmetric.   Psychiatric:         Behavior: Behavior normal.         Thought Content: Thought content normal.         Judgment: Judgment normal.         Lab Review:           Invalid input(s): LABALBU,  PROT                                    EKG:   ECG/EMG Results (last 24 hours)     ** No results found for the last 24 hours. **          Imaging:  Imaging Results (Last 24 Hours)     ** No results found for the last 24 hours. **          I personally viewed and interpreted the patient's EKG/Telemetry data.    Assessment:   1.  Lightheaded dizziness presyncope/syncope.  2.  Chest pain.  3.  Arterial hypertension.  4.  Hypertensive heart disease.  5.  Chronic anticoagulation with Xarelto.  6.  Hyperlipidemia.  7.  Obesity.  8.  History of migraine.  9.  History of prostate carcinoma.          Plan:   1.  Lightheaded dizziness presyncope/syncope.  Patient has had symptoms of lightheaded dizziness presyncopal symptoms.  Patient clearly has resting heart rate of 63 bpm currently on verapamil.  Patient has had episodes of low oxygen saturation.  Patient has had evaluation in [place].  Patient clearly has symptoms of lightheaded dizziness with syncopal episode.  Patient at the present time has been recommended to undergo a transthoracic echocardiogram to evaluate the left ventricular systolic function.  Patient has been recommended to undergo a head up tilt table testing.  Risk-benefit treatment option for the head up tilt table testing were discussed with the patient and an informed consent was obtained.    2.  Atypical chest pain.  Patient does have risk factors for atherosclerotic coronary artery disease.  Patient has been recommended to undergo a Lexiscan Cardiolite stress test.  Risk-benefit treatment option for the Lexiscan Cardiolite stress test were discussed with the patient and an informed consent was obtained.  Patient would undergo the head up tilt table testing first and then subsequently undergo the Lexiscan Cardiolite stress test.  Patient has been recommended to start isosorbide 30 mg daily.    3.  Arterial hypertension.  Patient blood pressure is 140/60.  Patient at the present time has been  recommended to continue with the present dose of the verapamil and the lisinopril.  Patient has been counseled to decrease her salt intake.    4.  Hypertensive heart disease with symptoms of shortness of breath.  Patient has been recommended to decrease her salt intake.  Patient would be started on Lasix 20 mg daily.  Patient has been recommended to undergo a transthoracic echocardiogram to evaluate the left ventricular systolic function and to rule out regional segmental wall motion abnormality.    5.  Hyperlipidemia.  Patient has been counseled on low-fat low-cholesterol diet and to undergo a lipid profile check.  If the patient has elevated LDL patient would be started on statin.    6.  Non-insulin-dependent diabetes.  Patient is on Januvia and glipizide and has been followed by Dr. Junior.    7.  Obesity with a body mass index of 30.  Patient has been counseled on weight reduction lifestyle modification and dietary restriction.    8.  History of gastroesophageal reflux disease.  Patient is currently on Protonix.    9.  History of migrainous headache.  Patient is on Maxalt.    10.  History of prostate carcinoma s/p radiation therapy as noted.    11.  Anxiety and depression.  Patient is on sertraline 100 mg daily.    12.  Patient had received a COVID-19 vaccine.  Patient has not had any exposure to Covid.    The above plan of management were discussed with the patient                Time: time spent in face-to-face evaluation of greater than 55 minutes and interacting and formulating examining and discussing the plan with the patient with 50% of greater time spent in face-to-face interaction.    Electronically signed by Chase King MD, 02/15/21, 7:18 AM CST.      Dictated utilizing Dragon dictation.

## 2021-02-19 ENCOUNTER — HOSPITAL ENCOUNTER (OUTPATIENT)
Dept: CARDIOLOGY | Facility: HOSPITAL | Age: 76
Discharge: HOME | End: 2021-02-19

## 2021-03-09 DIAGNOSIS — C61 PROSTATE CANCER (HCC): ICD-10-CM

## 2021-03-09 LAB — PROSTATE SPECIFIC ANTIGEN: <0.01 NG/ML (ref 0–4)

## 2021-03-15 ENCOUNTER — HOSPITAL ENCOUNTER (OUTPATIENT)
Dept: CARDIOLOGY | Facility: HOSPITAL | Age: 76
End: 2021-03-15

## 2021-03-17 ENCOUNTER — OFFICE VISIT (OUTPATIENT)
Dept: UROLOGY | Age: 76
End: 2021-03-17
Payer: MEDICARE

## 2021-03-17 DIAGNOSIS — C61 PROSTATE CANCER (HCC): Primary | ICD-10-CM

## 2021-03-17 DIAGNOSIS — R39.14 FEELING OF INCOMPLETE BLADDER EMPTYING: ICD-10-CM

## 2021-03-17 LAB
APPEARANCE FLUID: CLEAR
BILIRUBIN, POC: NORMAL
BLOOD URINE, POC: NORMAL
CLARITY, POC: CLEAR
COLOR, POC: YELLOW
GLUCOSE URINE, POC: NORMAL
KETONES, POC: NORMAL
LEUKOCYTE EST, POC: NORMAL
NITRITE, POC: NORMAL
PH, POC: 6
PROTEIN, POC: NORMAL
SPECIFIC GRAVITY, POC: 1.02
UROBILINOGEN, POC: 1

## 2021-03-17 PROCEDURE — 4040F PNEUMOC VAC/ADMIN/RCVD: CPT | Performed by: UROLOGY

## 2021-03-17 PROCEDURE — G8417 CALC BMI ABV UP PARAM F/U: HCPCS | Performed by: UROLOGY

## 2021-03-17 PROCEDURE — 1123F ACP DISCUSS/DSCN MKR DOCD: CPT | Performed by: UROLOGY

## 2021-03-17 PROCEDURE — G8484 FLU IMMUNIZE NO ADMIN: HCPCS | Performed by: UROLOGY

## 2021-03-17 PROCEDURE — 3017F COLORECTAL CA SCREEN DOC REV: CPT | Performed by: UROLOGY

## 2021-03-17 PROCEDURE — 99214 OFFICE O/P EST MOD 30 MIN: CPT | Performed by: UROLOGY

## 2021-03-17 PROCEDURE — 1036F TOBACCO NON-USER: CPT | Performed by: UROLOGY

## 2021-03-17 PROCEDURE — 51798 US URINE CAPACITY MEASURE: CPT | Performed by: UROLOGY

## 2021-03-17 PROCEDURE — 81002 URINALYSIS NONAUTO W/O SCOPE: CPT | Performed by: UROLOGY

## 2021-03-17 PROCEDURE — G8428 CUR MEDS NOT DOCUMENT: HCPCS | Performed by: UROLOGY

## 2021-03-17 ASSESSMENT — ENCOUNTER SYMPTOMS
SORE THROAT: 0
BACK PAIN: 0
VOMITING: 0
WHEEZING: 0
COUGH: 0
EYE PAIN: 0
NAUSEA: 0

## 2021-03-17 NOTE — PROGRESS NOTES
Isabelle Smith is a 77 y.o. male who presents today   Chief Complaint   Patient presents with    Follow-up     I am here for my 3 month follow up for prostate cancer. I had my PSA done prior to this visit. Prostate Cancer  Patient is here today for prostate cancer which was first diagnosed 14 months ago. 1/20/2020  His prostate cancer can be characterized as clinical stage T2b [facility] 3+4 = 7. Pretreatment PSA was 5.5. His last several PSA values are as follows:  Lab Results   Component Value Date    PSA <0.01 03/09/2021    PSA <0.01 12/09/2020    PSA 0.73 06/22/2014     Previous treatment of prostate cancer: External beam radiation therapy Ashley completed 9/10/2020. He did receive 6-month neoadjuvant hormonal therapy given 4/27/2020  Lower urinary tract symptoms: urgency, frequency and decreased force of stream, straining feeling of incomplete emptying intermittency. He rates his symptoms as severe his AUA symptom score is 22. He had been treated in the past for a urinary tract infection. He appeared to be treated at [facility] with Levaquin and Flagyl as well as oxybutynin. He was previously on tamsulosin and Avodart but I think he is no longer taking these.         Past Medical History:   Diagnosis Date    Diabetes mellitus ([address]     Elevated PSA     Hypertension     Kidney stone     Prostate cancer [facility])        Past Surgical History:   Procedure Laterality Date    APPENDECTOMY      JOINT REPLACEMENT  2014    SHOULDER SURGERY  2016    VASECTOMY  1979       Current Outpatient Medications   Medication Sig Dispense Refill    furosemide (LASIX) 20 MG tablet Take 20 mg by mouth daily      benzonatate (TESSALON) 100 MG capsule Take 100 mg by mouth 3 times daily as needed for Cough      megestrol (MEGACE) 20 MG tablet Take 1 tablet by mouth daily 30 tablet 11    TRUE METRIX BLOOD GLUCOSE TEST strip USE 1 STRIP TO CHECK GLUCOSE TWICE DAILY      Travoprost, BAK Free, (TRAVATAN Z) 0.004 % SOLN ophthalmic solution Travatan Z 0.004 % eye drops   INSTILL 1 DROP INTO EACH EYE AT BEDTIME      verapamil (VERELAN) 240 MG extended release capsule verapamil  mg 24 hr capsule,extended release      rivaroxaban (XARELTO) 20 MG TABS tablet Take 20 mg by mouth      glipiZIDE (GLUCOTROL) 5 MG tablet Take 5 mg by mouth      meclizine (ANTIVERT) 25 MG tablet Take 25 mg by mouth      SITagliptin (JANUVIA) 100 MG tablet Take 100 mg by mouth      sertraline (ZOLOFT) 100 MG tablet sertraline 100 mg tablet      cetirizine (ZYRTEC ALLERGY) 10 MG tablet Zyrtec 10 mg tablet   Take 1 tablet every day by oral route.  rizatriptan (MAXALT) 10 MG tablet rizatriptan 10 mg tablet      magnesium 30 MG tablet Take 30 mg by mouth 2 times daily      gabapentin (NEURONTIN) 100 MG capsule gabapentin 100 mg capsule      glyBURIDE (DIABETA) 5 MG tablet glyburide 5 mg tablet   Take 2 tablets twice a day by oral route.  zoster recombinant adjuvanted vaccine (SHINGRIX) 50 MCG/0.5ML SUSR injection Shingrix (PF) 50 mcg/0.5 mL intramuscular suspension, kit      pantoprazole (PROTONIX) 40 MG tablet TAKE 1 TABLET BY MOUTH ONCE DAILY      lisinopril (PRINIVIL;ZESTRIL) 5 MG tablet lisinopril 5 mg tablet       No current facility-administered medications for this visit.         Allergies   Allergen Reactions    Ticlopidine Hives and Swelling           Levofloxacin Other (See Comments)     weakness       Social History     Socioeconomic History    Marital status:      Spouse name: Not on file    Number of children: Not on file    Years of education: Not on file    Highest education level: Not on file   Occupational History    Not on file   Social Needs    Financial resource strain: Not on file    Food insecurity     Worry: Not on file     Inability: Not on file    Transportation needs     Medical: Not on file     Non-medical: Not on file   Tobacco Use    Smoking status: Never Smoker    Smokeless tobacco: Never Used   Substance and Sexual Activity    Alcohol use: Not on file    Drug use: Not on file    Sexual activity: Not on file   Lifestyle    Physical activity     Days per week: Not on file     Minutes per session: Not on file    Stress: Not on file   Relationships    Social connections     Talks on phone: Not on file     Gets together: Not on file     Attends Worship service: Not on file     Active member of club or organization: Not on file     Attends meetings of clubs or organizations: Not on file     Relationship status: Not on file    Intimate partner violence     Fear of current or ex partner: Not on file     Emotionally abused: Not on file     Physically abused: Not on file     Forced sexual activity: Not on file   Other Topics Concern    Not on file   Social History Narrative    Not on file       Family History   Problem Relation Age of Onset    Heart Attack Father 72        cause of death    Kidney Disease Mother     Other Mother         renal failure    Cancer Sister [de-identified]        Colon CA- cause of death     Heart Attack Sister     Colon Cancer Sister 68       REVIEW OF SYSTEMS:  Review of Systems   Constitutional: Negative for chills and fever. HENT: Negative for congestion and sore throat. Eyes: Negative for pain and visual disturbance. Respiratory: Negative for cough and wheezing. Cardiovascular: Negative for chest pain and palpitations. Gastrointestinal: Negative for nausea and vomiting. Endocrine: Negative for polyphagia and polyuria. Genitourinary: Negative for decreased urine volume, difficulty urinating, discharge, dysuria, enuresis, flank pain, frequency, genital sores, hematuria, penile pain, penile swelling, scrotal swelling, testicular pain and urgency.         Patient was seen in [facility] on 2/14/21: was discharged on Levofloxacin & flagyl also placed on flomax and ditropan; no longer taking these Musculoskeletal: Negative for back pain and neck pain. Skin: Negative for rash and wound. Allergic/Immunologic: Negative for environmental allergies and food allergies. Neurological: Negative for dizziness and headaches. Hematological: Negative for adenopathy. Does not bruise/bleed easily. Psychiatric/Behavioral: Negative for confusion and hallucinations. All other systems reviewed and are negative. PHYSICAL EXAM:  There were no vitals taken for this visit. Physical Exam  Constitutional:       General: He is not in acute distress. Appearance: Normal appearance. He is well-developed. HENT:      Head: Normocephalic and atraumatic. Nose: Nose normal.   Eyes:      General: No scleral icterus. Conjunctiva/sclera: Conjunctivae normal.      Pupils: Pupils are equal, round, and reactive to light. Neck:      Musculoskeletal: Normal range of motion and neck supple. Trachea: No tracheal deviation. Cardiovascular:      Rate and Rhythm: Normal rate and regular rhythm. Pulmonary:      Effort: Pulmonary effort is normal. No respiratory distress. Breath sounds: No stridor. Abdominal:      General: There is no distension. Palpations: Abdomen is soft. There is no mass. Tenderness: There is no abdominal tenderness. Musculoskeletal: Normal range of motion. General: No tenderness. Lymphadenopathy:      Cervical: No cervical adenopathy. Skin:     General: Skin is warm and dry. Findings: No erythema. Neurological:      Mental Status: He is alert and oriented to person, place, and time.    Psychiatric:         Behavior: Behavior normal.         Judgment: Judgment normal.             DATA:    Results for orders placed or performed in visit on 03/17/21   POCT Urinalysis no Micro   Result Value Ref Range    Color, UA YELLOW     Clarity, UA CLEAR     Glucose, UA POC NEG     Bilirubin, UA NEG     Ketones, UA NEG     Spec Grav, UA 1.025     Blood, UA POC NEG pH, UA 6.0     Protein, UA POC NEG     Urobilinogen, UA 1.0     Leukocytes, UA NEG     Nitrite, UA NEG     Appearance, Fluid Clear Clear, Slightly Cloudy     Lab Results   Component Value Date    PSA <0.01 03/09/2021    PSA <0.01 12/09/2020    PSA 0.73 06/22/2014         1. Prostate cancer [facility])  His PSA remains undetectable follow-up in 3 months with PSA.  - POCT Urinalysis no Micro  - PSA, Diagnostic; Future  - [place] Measure, post-void residual, US, non-imaging    2. Feeling of incomplete bladder emptying   His lower urinary tract symptoms remain severe despite medical therapy. But his urine is clear today with no infection and residual is okay we will continue to monitor  - [place] Measure, post-void residual, US, non-imaging      Orders Placed This Encounter   Procedures    PSA, Diagnostic     PSA in 3 months     Standing Status:   Future     Standing Expiration Date:   3/17/2022    POCT Urinalysis no Micro    [place] Measure, post-void residual, US, non-imaging     Bladderscan: 37ml        Return in about 3 months (around 6/17/2021) for PSA prior to vext visit. All information inputted into the note by the MA to include chief complaint, past medical history, past surgical history, medications, allergies, social and family history and review of systems has been reviewed and updated as needed by me. EMR Dragon/transcription disclaimer: Much of this documentt is electronic  transcription/translation of spoken language to printed text. The  electronic translation of spoken language may be erroneous, or at times,  nonsensical words or phrases may be inadvertently transcribed.  Although I  have reviewed the document for such errors, some may still exist.

## 2021-04-07 ENCOUNTER — HOSPITAL ENCOUNTER (OUTPATIENT)
Dept: CARDIOLOGY | Facility: HOSPITAL | Age: 76
Discharge: HOME | End: 2021-04-07

## 2021-04-07 DIAGNOSIS — R55 SYNCOPE AND COLLAPSE: ICD-10-CM

## 2021-04-07 PROCEDURE — 93660 TILT TABLE EVALUATION: CPT

## 2021-04-07 RX ORDER — FUROSEMIDE 20 MG/1
20 TABLET ORAL DAILY
COMMUNITY

## 2021-04-07 NOTE — H&P
Cardiology History and Physical Note        Patient Name: Alex Lobo  Age/Sex: 75 y.o. male  : 1945  MRN: [id number]    Date of Admission : 2021    Primary care Physician: Ulisses Junior MD    Reason for Admission: Lightheaded dizziness head up tilt table testing      Subjective:       Chief Complaint: Lightheaded dizziness shortness of breath    History of Present Illness:  Alex Lobo is a 75 y.o. male     Height of 6 feet 1 inches weight of 234 pounds with a body mass index of 30 with a past medical history significant for arterial hypertension, hypertensive heart disease, history of transient ischemic attack with previous hospitalization at [facility], history of diabetes, allergic rhinitis, gastroesophageal reflux disease, history of prostate carcinoma s/p radiation therapy, history of migraine, chronic anticoagulation with Xarelto, previous hospitalization.     Patient was evaluated by Dr. Nelson.  Patient has complained of having symptoms of shortness of breath along with symptoms of chest pain.  Patient complains of having symptoms of shortness of breath with complete loss of energy.  Patient has been walking to the mailbox with symptoms of shortness of breath.  Patient does admit on eating a lot of salt.  Patient has had 4 episodes of syncope.  Patient complains of having symptoms of lightheaded dizziness.  Patient on questioning complains of having symptoms of substernal chest discomfort which is more localized on the right side.  Patient clearly has had increasing episodes of shortness of breath.  Patient has been evaluated by hematology oncology in [place].  Patient had undergone transthoracic echocardiogram and x-ray.     Patient resting electrocardiogram done revealed sinus rhythm with nonspecific ST-T wave changes.  Patient on questioning has not had any recent cardiology evaluation.  Patient on evaluation with a resting EKG on  had isolated premature  ventricular complex.     Patient complains of having increasing shortness of breath along with some right-sided chest pain.  Patient clearly has not had any episodes of atrial fibrillation or documented paroxysmal atrial fibrillation.     Patient has no exposure to Covid.  Patient had received the Covid vaccine.     Patient blood pressure today is 140/60 pulse of 75 respiration of 18 oxygen saturation of 97%.        Patient 10 point review of system except for what stated in the history of present illness and negative.     Concurrent Medical History:  1.  Lightheaded dizziness syncope.  2.  Chest pain with shortness of breath.  3.  Resting electrocardiogram with isolated premature atrial complex.  4.  Arterial hypertension.  5.  Hypertensive heart disease.  6.  History of transient ischemic attack.  7.  Chronic anticoagulation with Xarelto.  8.  Previous hospitalization at [facility] with anticoagulation with Coumadin then.  9.  Diabetes.  10.  History of allergic rhinitis.  11.  Obesity with a body mass index of 30.  12.  Gastroesophageal reflux disease.  13.  History of migraine.  14.  Prostate carcinoma s/p radiation therapy.  15.  Oncology follow-up with [facility].  16.  Obstructive sleep apnea.  17.  COVID-19 vaccine        Past Surgical History:  1.  Vasectomy.  2.  Appendectomy.  3.  Right shoulder surgery.  4.  Right total knee arthroplasty.  5.  Lymph node resection from the back.  6.  Radiation therapy for prostate carcinoma.        Family History: Family history significant for father who had coronary artery disease sister who had coronary artery bypass grafting        Social History: Quit smoking 20 years ago denies any alcohol intake used to work as a .        Cardiac Risk factor:   1.  Male.  2.  Arterial hypertension.  3.  Obesity.  4.  Diabetes.  5.  Hyperlipidemia.  6.  Previous history of tobacco abuse.  7.  Family history for coronary artery  "disease    Allergies:  Allergies   Allergen Reactions   • Ticlid [Ticlopidine] Hives     swelling       Home Medication::  Prior to Admission medications    Medication Sig Start Date End Date Taking? Authorizing Provider   glipiZIDE (GLUCOTROL) 5 MG tablet Take 5 mg by mouth Daily. 8/6/19   Apolinar Alvarado MD   Insulin Syringe 31G X 5/16\" 0.5 ML misc To use as indicated once weekly 9/17/19   Timur Luis MD   JANUVIA 100 MG tablet Take 100 mg by mouth Daily. 7/23/19   Apolinar Alvarado MD   lisinopril (PRINIVIL,ZESTRIL) 5 MG tablet Take 5 mg by mouth Daily. 6/17/19   Apolinar Alvarado MD   meclizine (ANTIVERT) 25 MG tablet Take 25 mg by mouth 3 (Three) Times a Day As Needed for dizziness.    Apolinar Alvarado MD   Needle, Disp, 18G X 1\" misc To use weekly 9/17/19   Timur Luis MD   pantoprazole (PROTONIX) 40 MG EC tablet Take 40 mg by mouth Daily. 7/23/19   Apolinar Alvarado MD   rizatriptan (MAXALT) 10 MG tablet TAKE 1 TABLET BY MOUTH AS NEEDED FOR HEADACHE 8/22/19   Apolinar Alvarado MD   sertraline (ZOLOFT) 100 MG tablet Take 100 mg by mouth Daily. 6/17/19   Apolinar Alvarado MD   Testosterone Cypionate (DEPOTESTOTERONE CYPIONATE) 200 MG/ML injection 0.4 ml weekly = 80 mg weekly 9/17/19   Timur Luis MD   TRAVATAN Z 0.004 % solution ophthalmic solution INSTILL 1 DROP INTO EACH EYE AT BEDTIME 6/6/19   Apolinar Alvarado MD   TRUE METRIX BLOOD GLUCOSE TEST test strip 1 each by Other route 2 (Two) Times a Day. to check glucose 8/4/19   Apolinar Alvarado MD   verapamil ER (VERELAN) 240 MG 24 hr capsule Take 240 mg by mouth Daily. 6/17/19   Apolinar Alvarado MD   XARELTO 20 MG tablet Take 20 mg by mouth Daily. 6/18/19   Apolinar Alvarado MD         Review of Systems   Constitutional: Positive for fatigue. Negative for chills, fever and unexpected weight change.   HENT: Negative for hearing loss and nosebleeds.    Eyes: Negative for " visual disturbance.   Respiratory: Positive for shortness of breath. Negative for cough, chest tightness and wheezing.    Cardiovascular: Negative for chest pain, palpitations and leg swelling.   Gastrointestinal: Negative for abdominal pain, blood in stool, constipation, diarrhea, nausea and vomiting.   Endocrine: Negative for cold intolerance, heat intolerance, polydipsia, polyphagia and polyuria.   Genitourinary: Negative for hematuria.   Musculoskeletal: Negative for joint swelling, myalgias and neck pain.   Skin: Negative for color change, rash and wound.   Neurological: Positive for dizziness, syncope and light-headedness. Negative for seizures, numbness and headaches.   Hematological: Does not bruise/bleed easily.         Objective:     There were no vitals taken for this visit.  Blood pressure 140/60 pulse of 75 respiration of 18 oxygen saturation of 97%  Constitutional:       Appearance: Well-developed.   Eyes:      General: No scleral icterus.     Conjunctiva/sclera: Conjunctivae normal.      Pupils: Pupils are equal, round, and reactive to light.   HENT:      Head: Normocephalic and atraumatic.      Left Ear: External ear normal.      Nose: Nose normal.   Neck:      Thyroid: No thyromegaly.      Vascular: No JVD.      Trachea: No tracheal deviation.      Lymphadenopathy: No cervical adenopathy.   Pulmonary:      Breath sounds: Normal breath sounds. No stridor.   Cardiovascular:      Normal rate. Regular rhythm.   Abdominal:      General: Bowel sounds are normal.   Musculoskeletal: Normal range of motion.      Cervical back: Normal range of motion and neck supple. Skin:     General: Skin is warm and dry.   Neurological:      Mental Status: Alert and oriented to person, place, and time.      Deep Tendon Reflexes: Reflexes are normal and symmetric.   Psychiatric:         Behavior: Behavior normal.         Thought Content: Thought content normal.         Judgment: Judgment normal.         Lab Review:            Invalid input(s): LABALBU, PROT                                    EKG:   ECG/EMG Results (last 24 hours)     ** No results found for the last 24 hours. **          Imaging:  Imaging Results (Last 24 Hours)     ** No results found for the last 24 hours. **          I personally viewed and interpreted the patient's EKG/Telemetry data.    Assessment:       1.  Lightheaded dizziness presyncope/syncope.  2.  Chest pain.  3.  Arterial hypertension.  4.  Hypertensive heart disease.  5.  Chronic anticoagulation with Xarelto.  6.  Hyperlipidemia.  7.  Obesity.  8.  History of migraine.  9.  History of prostate carcinoma.      Plan:   1.  Lightheaded dizziness presyncope/syncope.  Patient has had symptoms of lightheaded dizziness presyncopal symptoms.  Patient clearly has resting heart rate of 63 bpm currently on verapamil.  Patient has had episodes of low oxygen saturation.  Patient has had evaluation in [place].  Patient clearly has symptoms of lightheaded dizziness with syncopal episode.  Patient at the present time has been recommended to undergo a transthoracic echocardiogram to evaluate the left ventricular systolic function.  Patient has been recommended to undergo a head up tilt table testing.  Risk-benefit treatment option for the head up tilt table testing were discussed with the patient and an informed consent was obtained.     2.  Atypical chest pain.  Patient does have risk factors for atherosclerotic coronary artery disease.  Patient has been recommended to undergo a Lexiscan Cardiolite stress test.  Risk-benefit treatment option for the Lexiscan Cardiolite stress test were discussed with the patient and an informed consent was obtained.  Patient would undergo the head up tilt table testing first and then subsequently undergo the Lexiscan Cardiolite stress test.  Patient has been recommended to start isosorbide 30 mg daily.     3.  Arterial hypertension.  Patient blood pressure is 140/60.  Patient at  the present time has been recommended to continue with the present dose of the verapamil and the lisinopril.  Patient has been counseled to decrease her salt intake.     4.  Hypertensive heart disease with symptoms of shortness of breath.  Patient has been recommended to decrease her salt intake.  Patient would be started on Lasix 20 mg daily.  Patient has been recommended to undergo a transthoracic echocardiogram to evaluate the left ventricular systolic function and to rule out regional segmental wall motion abnormality.     5.  Hyperlipidemia.  Patient has been counseled on low-fat low-cholesterol diet and to undergo a lipid profile check.  If the patient has elevated LDL patient would be started on statin.     6.  Non-insulin-dependent diabetes.  Patient is on Januvia and glipizide and has been followed by Dr. Junior.     7.  Obesity with a body mass index of 30.  Patient has been counseled on weight reduction lifestyle modification and dietary restriction.     8.  History of gastroesophageal reflux disease.  Patient is currently on Protonix.     9.  History of migrainous headache.  Patient is on Maxalt.     10.  History of prostate carcinoma s/p radiation therapy as noted.     11.  Anxiety and depression.  Patient is on sertraline 100 mg daily.     12.  Patient had received a COVID-19 vaccine.  Patient has not had any exposure to Covid.     The above plan of management were discussed with the patient      Time: time spent in face-to-face evaluation of greater than 55 minutes and interacting and formulating examining and discussing the plan with the patient with 50% of greater time spent in face-to-face interaction.    Electronically signed by Chase King MD, 04/07/21, 6:41 AM CDT.    Dictated utilizing Dragon dictation.

## 2021-04-08 ENCOUNTER — HOSPITAL ENCOUNTER (OUTPATIENT)
Dept: CARDIOLOGY | Facility: HOSPITAL | Age: 76
End: 2021-04-08

## 2021-04-12 ENCOUNTER — OFFICE VISIT (OUTPATIENT)
Dept: UROLOGY | Age: 76
End: 2021-04-12
Payer: MEDICARE

## 2021-04-12 VITALS
TEMPERATURE: 96.7 F | HEIGHT: 73 IN | DIASTOLIC BLOOD PRESSURE: 79 MMHG | HEART RATE: 86 BPM | BODY MASS INDEX: 31.68 KG/M2 | WEIGHT: 239 LBS | SYSTOLIC BLOOD PRESSURE: 147 MMHG

## 2021-04-12 DIAGNOSIS — R31.0 GROSS HEMATURIA: ICD-10-CM

## 2021-04-12 DIAGNOSIS — R30.0 DYSURIA: Primary | ICD-10-CM

## 2021-04-12 LAB
BACTERIA URINE, POC: 0
BILIRUBIN URINE: 0 MG/DL
BLOOD, URINE: POSITIVE
CASTS URINE, POC: 0
CLARITY: ABNORMAL
COLOR: ABNORMAL
CRYSTALS URINE, POC: 0
EPI CELLS URINE, POC: 0
GLUCOSE URINE: ABNORMAL
KETONES, URINE: NEGATIVE
LEUKOCYTE EST, POC: ABNORMAL
NITRITE, URINE: NEGATIVE
PH UA: 5.5 (ref 4.5–8)
PROTEIN UA: POSITIVE
RBC URINE, POC: ABNORMAL
SPECIFIC GRAVITY UA: 1.03 (ref 1–1.03)
UROBILINOGEN, URINE: NORMAL
WBC URINE, POC: 0
YEAST URINE, POC: 0

## 2021-04-12 PROCEDURE — 81001 URINALYSIS AUTO W/SCOPE: CPT | Performed by: NURSE PRACTITIONER

## 2021-04-12 PROCEDURE — 99215 OFFICE O/P EST HI 40 MIN: CPT | Performed by: NURSE PRACTITIONER

## 2021-04-12 PROCEDURE — 1036F TOBACCO NON-USER: CPT | Performed by: NURSE PRACTITIONER

## 2021-04-12 PROCEDURE — 1123F ACP DISCUSS/DSCN MKR DOCD: CPT | Performed by: NURSE PRACTITIONER

## 2021-04-12 PROCEDURE — G2212 PROLONG OUTPT/OFFICE VIS: HCPCS | Performed by: NURSE PRACTITIONER

## 2021-04-12 PROCEDURE — 51798 US URINE CAPACITY MEASURE: CPT | Performed by: NURSE PRACTITIONER

## 2021-04-12 PROCEDURE — 3017F COLORECTAL CA SCREEN DOC REV: CPT | Performed by: NURSE PRACTITIONER

## 2021-04-12 PROCEDURE — 4040F PNEUMOC VAC/ADMIN/RCVD: CPT | Performed by: NURSE PRACTITIONER

## 2021-04-12 PROCEDURE — G8417 CALC BMI ABV UP PARAM F/U: HCPCS | Performed by: NURSE PRACTITIONER

## 2021-04-12 PROCEDURE — G8427 DOCREV CUR MEDS BY ELIG CLIN: HCPCS | Performed by: NURSE PRACTITIONER

## 2021-04-12 RX ORDER — CEFDINIR 300 MG/1
300 CAPSULE ORAL 2 TIMES DAILY
Qty: 20 CAPSULE | Refills: 0 | Status: ON HOLD | OUTPATIENT
Start: 2021-04-12 | End: 2021-04-16 | Stop reason: SDUPTHER

## 2021-04-12 NOTE — PROGRESS NOTES
Shelton James is a 77 y.o., male, Established patient who presents today   Chief Complaint   Patient presents with    Follow-up     Large Hematuria sometimes in pain     Hematuria       Hematuria  This is a recurrent problem. The current episode started in the past 7 days (most recent episode began Friday, evaluated by Shai Payne this morning who called to get the patient worked in this afternoon). The problem has been waxing and waning since onset. He describes the hematuria as gross hematuria. He reports no clotting in his urine stream. The pain is moderate (dysuria, but this is persistent even without hematuria). Urine color: wine colored- rust colored per patient. Irritative symptoms include frequency, nocturia and urgency. Obstructive symptoms include dribbling, incomplete emptying, an intermittent stream, a slower stream, straining and a weak stream. Associated symptoms include dysuria, flank pain (bilateral), hesitancy and inability to urinate (states he feels as if he is going several hours at a time not being able to urinate). Pertinent negatives include no abdominal pain, chills, fever, genital pain, nausea or vomiting. His past medical history is significant for BPH and hypertension. There is no history of tobacco use. (On Xarelto chronically, but Dr. Shai Callahan put this medication on hold on Friday when hematuria started,)      Patient notes several hospitalizations at [facility] for the same issues. He reports he was admitted from the emergency room on September 5, 2020 and on February 11, 2021. He presented to the emergency room on December 2, 2020 with the same symptoms, however, he did not appear to have an infection at that time, so he was not admitted. Each time he experienced fevers, chills, back pain, dysuria, hematuria. He also reports he was unable to urinate with each admission. Patient is also seen in our office for prostate cancer. This was diagnosed in January 2020.   His cancer can be characterized as clinical stage T2b Purchase 3+4 equal 7. Pretreatment PSA was 5.5. Most recent PSAs are undetectable. He underwent external beam radiation therapy in [place] which was completed in August 2020. He also received at least 1 6 months dose of neoadjuvant hormonal therapy in April 2020. He states he was previously taking Flomax and Avodart, however, he was told to discontinue these. I am unsure why and the patient is not sure either. REVIEW OF SYSTEMS:  Review of Systems   Constitutional: Negative for chills and fever. Gastrointestinal: Negative for abdominal distention, abdominal pain, nausea and vomiting. Genitourinary: Positive for difficulty urinating, dysuria, flank pain (bilateral), frequency, hematuria, hesitancy, incomplete emptying, nocturia and urgency. Musculoskeletal: Negative for back pain and gait problem. Psychiatric/Behavioral: Negative for agitation and confusion. PHYSICAL EXAM:  BP (!) 147/79   Pulse 86   Temp 96.7 °F (35.9 °C) (Temporal)   Ht 6' 1\" (1.854 m)   Wt 239 lb (108.4 kg)   BMI 31.53 kg/m²   Physical Exam  Vitals signs and nursing note reviewed. Constitutional:       General: He is not in acute distress. Appearance: Normal appearance. He is not ill-appearing. Pulmonary:      Effort: Pulmonary effort is normal. No respiratory distress. Abdominal:      General: There is no distension. Tenderness: There is no abdominal tenderness. There is no right CVA tenderness or left CVA tenderness. Neurological:      Mental Status: He is alert and oriented to person, place, and time. Mental status is at baseline.    Psychiatric:         Mood and Affect: Mood normal.         Behavior: Behavior normal.         DATA:  Results for orders placed or performed in visit on 04/12/21   POCT Urinalysis Dipstick w/ Micro (Auto)   Result Value Ref Range    Color, UA Jayda     Clarity, UA Cloudy (A) Clear    Glucose, Ur NEG     Bilirubin Urine 0 mg/dL Ketones, Urine Negative     Specific Gravity, UA 1.030 1.005 - 1.030    Blood, Urine Positive     pH, UA 5.5 4.5 - 8.0    Protein, UA Positive (A) Negative    Nitrite, Urine Negative     Leukocytes, UA neg     Urobilinogen, Urine Normal     rbc urine, poc TNTC     wbc urine, poc 0     bacteria urine, poc 0     yeast urine, poc 0     casts urine, poc 0     epi cells urine, poc 0     crystals urine, poc 0        Bladder Scan interpretation  Estimation of residual urine via abdominal ultrasound  Residual Urine: 49 ml  Indication: Difficulty urinating  Position: Supine  Examination: Incremental scanning of the suprapubic area using 3 MHz transducer using copious amounts of acoustic gel. Findings: An anechoic area was demonstrated which represented the bladder, with measurement of residual urine as noted. ASSESSMENT/PLAN  1. Dysuria  Patient continues to complain of dysuria. He reports is been present for several months, likely since the beginning of his radiation therapy. He denies any alleviating factors. Because I am scheduling a cystoscopy, and patient has recent history of urinary tract infection, I will go ahead and send his urine for culture today and treat empirically with Omnicef. I reviewed his urine underneath the scope today, and I was unable to see any cells except for red cells. His urine in the cup, did not necessarily appear grossly bloody. We discussed this pain may be secondary to radiation therapy and may not be curable with medications. - Culture, Urine  - cefdinir (OMNICEF) 300 MG capsule; Take 1 capsule by mouth 2 times daily for 10 days  Dispense: 20 capsule; Refill: 0  - POCT Urinalysis Dipstick w/ Micro (Auto)    2. Gross hematuria  Patient has history of radiation therapy. 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   Given this and the fact that the patient feels as if he is unable to empty his bladder or unable to urinate for hours at a time, I would like him to be evaluated by cystoscopy by Dr. Christine Blackwell for strictures. Post void residual indicates he is emptying his bladder well. I did not see the need for catheter today. At least 60 minutes were spent on the day of the visit reviewing the patient's past medical records/imaging, speaking face to face with the patient, and charting in the post visit period. Orders Placed This Encounter   Procedures    Culture, Urine     Order Specific Question:   Specify (ex-cath, midstream, cysto, etc)? Answer:   clean catch    POCT Urinalysis Dipstick w/ Micro (Auto)    Cystoscopy     Next available     Standing Status:   Future     Standing Expiration Date:   4/13/2022     Scheduling Instructions:      Next available        No follow-ups on file. An electronic signature was used to authenticate this note. JACKELYN GAONA - CNP    All information inputted into the note by the MA to include chief complaint, past medical history, past surgical history, medications, allergies, social and family history and review of systems has been reviewed and updated as needed by me. EMR Dragon/transcription disclaimer: Much of this document is electronic transcription/translation of spoken language to printed text. The electronic translation of spoken language may be erroneous or, at times, nonsensical words or phrases may be inadvertently transcribed.  Although I have reviewed the document for such errors, some may still exist.

## 2021-04-12 NOTE — Clinical Note
Need records from [facility] admissions in September 2020 and February 2021. I found December 2020 in media, but cant find the others if they are there.

## 2021-04-13 ASSESSMENT — ENCOUNTER SYMPTOMS
VOMITING: 0
ABDOMINAL PAIN: 0
NAUSEA: 0
ABDOMINAL DISTENTION: 0
BACK PAIN: 0

## 2021-04-13 ASSESSMENT — LIFESTYLE VARIABLES: TOBACCO_USE: 0

## 2021-04-14 LAB — URINE CULTURE, ROUTINE: NORMAL

## 2021-04-15 ENCOUNTER — HOSPITAL ENCOUNTER (OUTPATIENT)
Age: 76
Setting detail: OBSERVATION
Discharge: HOME | End: 2021-04-16
Attending: UROLOGY | Admitting: UROLOGY
Payer: MEDICARE

## 2021-04-15 ENCOUNTER — PROCEDURE VISIT (OUTPATIENT)
Dept: UROLOGY | Age: 76
End: 2021-04-15

## 2021-04-15 ENCOUNTER — ANESTHESIA (OUTPATIENT)
Dept: OPERATING ROOM | Age: 76
End: 2021-04-15
Payer: MEDICARE

## 2021-04-15 ENCOUNTER — ANESTHESIA EVENT (OUTPATIENT)
Dept: OPERATING ROOM | Age: 76
End: 2021-04-15
Payer: MEDICARE

## 2021-04-15 ENCOUNTER — DIRECT ADMIT ORDERS (OUTPATIENT)
Dept: UROLOGY | Age: 76
End: 2021-04-15

## 2021-04-15 VITALS — TEMPERATURE: 96.6 F | DIASTOLIC BLOOD PRESSURE: 69 MMHG | SYSTOLIC BLOOD PRESSURE: 140 MMHG | OXYGEN SATURATION: 99 %

## 2021-04-15 DIAGNOSIS — N35.912 STRICTURE OF BULBOUS URETHRA IN MALE, UNSPECIFIED STRICTURE TYPE: ICD-10-CM

## 2021-04-15 DIAGNOSIS — R31.0 GROSS HEMATURIA: ICD-10-CM

## 2021-04-15 DIAGNOSIS — R31.0 GROSS HEMATURIA: Primary | ICD-10-CM

## 2021-04-15 DIAGNOSIS — N99.111 POSTPROCEDURAL BULBOUS URETHRAL STRICTURE: ICD-10-CM

## 2021-04-15 DIAGNOSIS — C67.2 MALIGNANT NEOPLASM OF LATERAL WALL OF URINARY BLADDER (HCC): ICD-10-CM

## 2021-04-15 DIAGNOSIS — R30.0 DYSURIA: ICD-10-CM

## 2021-04-15 DIAGNOSIS — C61 PROSTATE CANCER (HCC): Primary | ICD-10-CM

## 2021-04-15 DIAGNOSIS — C61 PROSTATE CANCER (HCC): ICD-10-CM

## 2021-04-15 LAB
ALBUMIN SERPL-MCNC: 4.2 G/DL (ref 3.5–5.2)
ALP BLD-CCNC: 72 U/L (ref 40–130)
ALT SERPL-CCNC: 34 U/L (ref 5–41)
ANION GAP SERPL CALCULATED.3IONS-SCNC: 12 MMOL/L (ref 7–19)
APTT: 29.3 SEC (ref 26–36.2)
AST SERPL-CCNC: 37 U/L (ref 5–40)
ATYPICAL LYMPHOCYTE RELATIVE PERCENT: 6 % (ref 0–8)
BANDED NEUTROPHILS RELATIVE PERCENT: 2 % (ref 0–5)
BASOPHILS ABSOLUTE: 0 K/UL (ref 0–0.2)
BASOPHILS RELATIVE PERCENT: 0 % (ref 0–1)
BILIRUB SERPL-MCNC: 0.3 MG/DL (ref 0.2–1.2)
BUN BLDV-MCNC: 25 MG/DL (ref 8–23)
CALCIUM SERPL-MCNC: 9.7 MG/DL (ref 8.8–10.2)
CHLORIDE BLD-SCNC: 106 MMOL/L (ref 98–111)
CO2: 25 MMOL/L (ref 22–29)
CREAT SERPL-MCNC: 0.8 MG/DL (ref 0.5–1.2)
EOSINOPHILS ABSOLUTE: 0.11 K/UL (ref 0–0.6)
EOSINOPHILS RELATIVE PERCENT: 3 % (ref 0–5)
GFR AFRICAN AMERICAN: >59
GFR NON-AFRICAN AMERICAN: >60
GLUCOSE BLD-MCNC: 110 MG/DL (ref 74–109)
HCT VFR BLD CALC: 39.2 % (ref 42–52)
HEMOGLOBIN: 13.1 G/DL (ref 14–18)
IMMATURE GRANULOCYTES #: 0 K/UL
INR BLD: 0.98 (ref 0.88–1.18)
LYMPHOCYTES ABSOLUTE: 1.2 K/UL (ref 1.1–4.5)
LYMPHOCYTES RELATIVE PERCENT: 26 % (ref 20–40)
MCH RBC QN AUTO: 33.3 PG (ref 27–31)
MCHC RBC AUTO-ENTMCNC: 33.4 G/DL (ref 33–37)
MCV RBC AUTO: 99.7 FL (ref 80–94)
MONOCYTES ABSOLUTE: 0.5 K/UL (ref 0–0.9)
MONOCYTES RELATIVE PERCENT: 13 % (ref 0–10)
NEUTROPHILS ABSOLUTE: 1.9 K/UL (ref 1.5–7.5)
NEUTROPHILS RELATIVE PERCENT: 50 % (ref 50–65)
PDW BLD-RTO: 14.2 % (ref 11.5–14.5)
PLATELET # BLD: 165 K/UL (ref 130–400)
PLATELET SLIDE REVIEW: ADEQUATE
PMV BLD AUTO: 9.4 FL (ref 9.4–12.4)
POTASSIUM SERPL-SCNC: 4.5 MMOL/L (ref 3.5–5)
PROTHROMBIN TIME: 12.9 SEC (ref 12–14.6)
RBC # BLD: 3.93 M/UL (ref 4.7–6.1)
RBC # BLD: NORMAL 10*6/UL
SARS-COV-2, NAAT: NOT DETECTED
SODIUM BLD-SCNC: 143 MMOL/L (ref 136–145)
TOTAL PROTEIN: 6.9 G/DL (ref 6.6–8.7)
WBC # BLD: 3.7 K/UL (ref 4.8–10.8)

## 2021-04-15 PROCEDURE — 85610 PROTHROMBIN TIME: CPT

## 2021-04-15 PROCEDURE — 7100000000 HC PACU RECOVERY - FIRST 15 MIN: Performed by: UROLOGY

## 2021-04-15 PROCEDURE — 88305 TISSUE EXAM BY PATHOLOGIST: CPT

## 2021-04-15 PROCEDURE — 85730 THROMBOPLASTIN TIME PARTIAL: CPT

## 2021-04-15 PROCEDURE — 74420 UROGRAPHY RTRGR +-KUB: CPT | Performed by: UROLOGY

## 2021-04-15 PROCEDURE — 3600000004 HC SURGERY LEVEL 4 BASE: Performed by: UROLOGY

## 2021-04-15 PROCEDURE — G0378 HOSPITAL OBSERVATION PER HR: HCPCS

## 2021-04-15 PROCEDURE — 6370000000 HC RX 637 (ALT 250 FOR IP): Performed by: UROLOGY

## 2021-04-15 PROCEDURE — 99999 PR OFFICE/OUTPT VISIT,PROCEDURE ONLY: CPT | Performed by: UROLOGY

## 2021-04-15 PROCEDURE — 2580000003 HC RX 258: Performed by: UROLOGY

## 2021-04-15 PROCEDURE — C1769 GUIDE WIRE: HCPCS | Performed by: UROLOGY

## 2021-04-15 PROCEDURE — 52005 CYSTO W/URTRL CATHJ: CPT | Performed by: UROLOGY

## 2021-04-15 PROCEDURE — 7100000001 HC PACU RECOVERY - ADDTL 15 MIN: Performed by: UROLOGY

## 2021-04-15 PROCEDURE — 93005 ELECTROCARDIOGRAM TRACING: CPT

## 2021-04-15 PROCEDURE — 6360000002 HC RX W HCPCS: Performed by: ANESTHESIOLOGY

## 2021-04-15 PROCEDURE — 6370000000 HC RX 637 (ALT 250 FOR IP): Performed by: ANESTHESIOLOGY

## 2021-04-15 PROCEDURE — 99219 PR INITIAL OBSERVATION CARE/DAY 50 MINUTES: CPT | Performed by: UROLOGY

## 2021-04-15 PROCEDURE — 36415 COLL VENOUS BLD VENIPUNCTURE: CPT

## 2021-04-15 PROCEDURE — 2720000010 HC SURG SUPPLY STERILE: Performed by: UROLOGY

## 2021-04-15 PROCEDURE — 2709999900 HC NON-CHARGEABLE SUPPLY: Performed by: UROLOGY

## 2021-04-15 PROCEDURE — G0379 DIRECT REFER HOSPITAL OBSERV: HCPCS

## 2021-04-15 PROCEDURE — 6360000002 HC RX W HCPCS: Performed by: UROLOGY

## 2021-04-15 PROCEDURE — 52235 CYSTOSCOPY AND TREATMENT: CPT | Performed by: UROLOGY

## 2021-04-15 PROCEDURE — 2500000003 HC RX 250 WO HCPCS: Performed by: ANESTHESIOLOGY

## 2021-04-15 PROCEDURE — 80053 COMPREHEN METABOLIC PANEL: CPT

## 2021-04-15 PROCEDURE — 3700000001 HC ADD 15 MINUTES (ANESTHESIA): Performed by: UROLOGY

## 2021-04-15 PROCEDURE — 85025 COMPLETE CBC W/AUTO DIFF WBC: CPT

## 2021-04-15 PROCEDURE — C1758 CATHETER, URETERAL: HCPCS | Performed by: UROLOGY

## 2021-04-15 PROCEDURE — 3600000014 HC SURGERY LEVEL 4 ADDTL 15MIN: Performed by: UROLOGY

## 2021-04-15 PROCEDURE — 87635 SARS-COV-2 COVID-19 AMP PRB: CPT

## 2021-04-15 PROCEDURE — 3700000000 HC ANESTHESIA ATTENDED CARE: Performed by: UROLOGY

## 2021-04-15 RX ORDER — PROPOFOL 10 MG/ML
INJECTION, EMULSION INTRAVENOUS PRN
Status: DISCONTINUED | OUTPATIENT
Start: 2021-04-15 | End: 2021-04-15 | Stop reason: SDUPTHER

## 2021-04-15 RX ORDER — GLIPIZIDE 5 MG/1
5 TABLET ORAL DAILY
Status: DISCONTINUED | OUTPATIENT
Start: 2021-04-16 | End: 2021-04-16 | Stop reason: HOSPADM

## 2021-04-15 RX ORDER — MORPHINE SULFATE 4 MG/ML
2 INJECTION, SOLUTION INTRAMUSCULAR; INTRAVENOUS EVERY 5 MIN PRN
Status: DISCONTINUED | OUTPATIENT
Start: 2021-04-15 | End: 2021-04-15 | Stop reason: HOSPADM

## 2021-04-15 RX ORDER — SODIUM CHLORIDE 0.9 % (FLUSH) 0.9 %
5-40 SYRINGE (ML) INJECTION EVERY 12 HOURS SCHEDULED
Status: DISCONTINUED | OUTPATIENT
Start: 2021-04-15 | End: 2021-04-16 | Stop reason: HOSPADM

## 2021-04-15 RX ORDER — METOCLOPRAMIDE HYDROCHLORIDE 5 MG/ML
10 INJECTION INTRAMUSCULAR; INTRAVENOUS
Status: DISCONTINUED | OUTPATIENT
Start: 2021-04-15 | End: 2021-04-15 | Stop reason: HOSPADM

## 2021-04-15 RX ORDER — MORPHINE SULFATE 4 MG/ML
4 INJECTION, SOLUTION INTRAMUSCULAR; INTRAVENOUS
Status: DISCONTINUED | OUTPATIENT
Start: 2021-04-15 | End: 2021-04-15 | Stop reason: HOSPADM

## 2021-04-15 RX ORDER — LIDOCAINE HYDROCHLORIDE 10 MG/ML
1 INJECTION, SOLUTION EPIDURAL; INFILTRATION; INTRACAUDAL; PERINEURAL
Status: DISCONTINUED | OUTPATIENT
Start: 2021-04-15 | End: 2021-04-15 | Stop reason: HOSPADM

## 2021-04-15 RX ORDER — MECLIZINE HYDROCHLORIDE 25 MG/1
25 TABLET ORAL 3 TIMES DAILY PRN
Status: DISCONTINUED | OUTPATIENT
Start: 2021-04-15 | End: 2021-04-16 | Stop reason: HOSPADM

## 2021-04-15 RX ORDER — SODIUM CHLORIDE 0.9 % (FLUSH) 0.9 %
5-40 SYRINGE (ML) INJECTION PRN
Status: CANCELLED | OUTPATIENT
Start: 2021-04-15

## 2021-04-15 RX ORDER — MEGESTROL ACETATE 40 MG/1
20 TABLET ORAL DAILY
Status: DISCONTINUED | OUTPATIENT
Start: 2021-04-16 | End: 2021-04-16 | Stop reason: HOSPADM

## 2021-04-15 RX ORDER — SERTRALINE HYDROCHLORIDE 100 MG/1
100 TABLET, FILM COATED ORAL DAILY
Status: DISCONTINUED | OUTPATIENT
Start: 2021-04-16 | End: 2021-04-16 | Stop reason: HOSPADM

## 2021-04-15 RX ORDER — CETIRIZINE HYDROCHLORIDE 10 MG/1
10 TABLET ORAL DAILY PRN
Status: DISCONTINUED | OUTPATIENT
Start: 2021-04-15 | End: 2021-04-16 | Stop reason: HOSPADM

## 2021-04-15 RX ORDER — ACETAMINOPHEN 325 MG/1
650 TABLET ORAL EVERY 6 HOURS PRN
Status: CANCELLED | OUTPATIENT
Start: 2021-04-15

## 2021-04-15 RX ORDER — SODIUM CHLORIDE 0.9 % (FLUSH) 0.9 %
5-40 SYRINGE (ML) INJECTION PRN
Status: DISCONTINUED | OUTPATIENT
Start: 2021-04-15 | End: 2021-04-16 | Stop reason: HOSPADM

## 2021-04-15 RX ORDER — LISINOPRIL 5 MG/1
5 TABLET ORAL DAILY
Status: DISCONTINUED | OUTPATIENT
Start: 2021-04-16 | End: 2021-04-16 | Stop reason: HOSPADM

## 2021-04-15 RX ORDER — OXYCODONE HYDROCHLORIDE AND ACETAMINOPHEN 5; 325 MG/1; MG/1
2 TABLET ORAL EVERY 4 HOURS PRN
Status: DISCONTINUED | OUTPATIENT
Start: 2021-04-15 | End: 2021-04-16 | Stop reason: HOSPADM

## 2021-04-15 RX ORDER — SODIUM CHLORIDE 9 MG/ML
25 INJECTION, SOLUTION INTRAVENOUS PRN
Status: CANCELLED | OUTPATIENT
Start: 2021-04-15

## 2021-04-15 RX ORDER — SUMATRIPTAN 25 MG/1
100 TABLET, FILM COATED ORAL ONCE
Status: DISCONTINUED | OUTPATIENT
Start: 2021-04-15 | End: 2021-04-16 | Stop reason: HOSPADM

## 2021-04-15 RX ORDER — NICOTINE POLACRILEX 4 MG
15 LOZENGE BUCCAL PRN
Status: DISCONTINUED | OUTPATIENT
Start: 2021-04-15 | End: 2021-04-16 | Stop reason: HOSPADM

## 2021-04-15 RX ORDER — VERAPAMIL HYDROCHLORIDE 120 MG/1
240 CAPSULE, EXTENDED RELEASE ORAL DAILY
Status: DISCONTINUED | OUTPATIENT
Start: 2021-04-16 | End: 2021-04-16 | Stop reason: HOSPADM

## 2021-04-15 RX ORDER — ATROPA BELLADONNA AND OPIUM 16.2; 3 MG/1; MG/1
30 SUPPOSITORY RECTAL EVERY 8 HOURS PRN
Status: DISCONTINUED | OUTPATIENT
Start: 2021-04-15 | End: 2021-04-16 | Stop reason: HOSPADM

## 2021-04-15 RX ORDER — DEXTROSE MONOHYDRATE 50 MG/ML
100 INJECTION, SOLUTION INTRAVENOUS PRN
Status: DISCONTINUED | OUTPATIENT
Start: 2021-04-15 | End: 2021-04-16 | Stop reason: HOSPADM

## 2021-04-15 RX ORDER — PROMETHAZINE HYDROCHLORIDE 25 MG/ML
6.25 INJECTION, SOLUTION INTRAMUSCULAR; INTRAVENOUS
Status: DISCONTINUED | OUTPATIENT
Start: 2021-04-15 | End: 2021-04-15 | Stop reason: HOSPADM

## 2021-04-15 RX ORDER — MIDAZOLAM HYDROCHLORIDE 1 MG/ML
2 INJECTION INTRAMUSCULAR; INTRAVENOUS
Status: DISCONTINUED | OUTPATIENT
Start: 2021-04-15 | End: 2021-04-15 | Stop reason: HOSPADM

## 2021-04-15 RX ORDER — ACETAMINOPHEN 650 MG/1
650 SUPPOSITORY RECTAL EVERY 6 HOURS PRN
Status: DISCONTINUED | OUTPATIENT
Start: 2021-04-15 | End: 2021-04-16 | Stop reason: HOSPADM

## 2021-04-15 RX ORDER — ONDANSETRON 2 MG/ML
4 INJECTION INTRAMUSCULAR; INTRAVENOUS EVERY 4 HOURS PRN
Status: DISCONTINUED | OUTPATIENT
Start: 2021-04-15 | End: 2021-04-16 | Stop reason: HOSPADM

## 2021-04-15 RX ORDER — SODIUM CHLORIDE, SODIUM LACTATE, POTASSIUM CHLORIDE, CALCIUM CHLORIDE 600; 310; 30; 20 MG/100ML; MG/100ML; MG/100ML; MG/100ML
INJECTION, SOLUTION INTRAVENOUS CONTINUOUS
Status: DISCONTINUED | OUTPATIENT
Start: 2021-04-15 | End: 2021-04-15

## 2021-04-15 RX ORDER — HYDROMORPHONE HYDROCHLORIDE 1 MG/ML
0.5 INJECTION, SOLUTION INTRAMUSCULAR; INTRAVENOUS; SUBCUTANEOUS EVERY 5 MIN PRN
Status: DISCONTINUED | OUTPATIENT
Start: 2021-04-15 | End: 2021-04-15 | Stop reason: HOSPADM

## 2021-04-15 RX ORDER — DEXTROSE MONOHYDRATE 25 G/50ML
12.5 INJECTION, SOLUTION INTRAVENOUS PRN
Status: DISCONTINUED | OUTPATIENT
Start: 2021-04-15 | End: 2021-04-16 | Stop reason: HOSPADM

## 2021-04-15 RX ORDER — HYDRALAZINE HYDROCHLORIDE 20 MG/ML
5 INJECTION INTRAMUSCULAR; INTRAVENOUS EVERY 10 MIN PRN
Status: DISCONTINUED | OUTPATIENT
Start: 2021-04-15 | End: 2021-04-15 | Stop reason: HOSPADM

## 2021-04-15 RX ORDER — OXYCODONE HYDROCHLORIDE AND ACETAMINOPHEN 5; 325 MG/1; MG/1
1 TABLET ORAL EVERY 4 HOURS PRN
Status: DISCONTINUED | OUTPATIENT
Start: 2021-04-15 | End: 2021-04-16 | Stop reason: HOSPADM

## 2021-04-15 RX ORDER — SODIUM CHLORIDE 9 MG/ML
25 INJECTION, SOLUTION INTRAVENOUS PRN
Status: DISCONTINUED | OUTPATIENT
Start: 2021-04-15 | End: 2021-04-15 | Stop reason: HOSPADM

## 2021-04-15 RX ORDER — LATANOPROST 50 UG/ML
1 SOLUTION/ DROPS OPHTHALMIC NIGHTLY
Status: DISCONTINUED | OUTPATIENT
Start: 2021-04-15 | End: 2021-04-16 | Stop reason: HOSPADM

## 2021-04-15 RX ORDER — ACETAMINOPHEN 650 MG/1
650 SUPPOSITORY RECTAL EVERY 6 HOURS PRN
Status: DISCONTINUED | OUTPATIENT
Start: 2021-04-15 | End: 2021-04-15 | Stop reason: HOSPADM

## 2021-04-15 RX ORDER — SCOLOPAMINE TRANSDERMAL SYSTEM 1 MG/1
1 PATCH, EXTENDED RELEASE TRANSDERMAL ONCE
Status: DISCONTINUED | OUTPATIENT
Start: 2021-04-15 | End: 2021-04-15

## 2021-04-15 RX ORDER — CIPROFLOXACIN 2 MG/ML
400 INJECTION, SOLUTION INTRAVENOUS
Status: COMPLETED | OUTPATIENT
Start: 2021-04-15 | End: 2021-04-15

## 2021-04-15 RX ORDER — PANTOPRAZOLE SODIUM 40 MG/1
40 TABLET, DELAYED RELEASE ORAL DAILY
Status: DISCONTINUED | OUTPATIENT
Start: 2021-04-16 | End: 2021-04-16 | Stop reason: HOSPADM

## 2021-04-15 RX ORDER — CIPROFLOXACIN 2 MG/ML
INJECTION, SOLUTION INTRAVENOUS PRN
Status: DISCONTINUED | OUTPATIENT
Start: 2021-04-15 | End: 2021-04-15 | Stop reason: SDUPTHER

## 2021-04-15 RX ORDER — SODIUM CHLORIDE 9 MG/ML
25 INJECTION, SOLUTION INTRAVENOUS PRN
Status: DISCONTINUED | OUTPATIENT
Start: 2021-04-15 | End: 2021-04-16 | Stop reason: HOSPADM

## 2021-04-15 RX ORDER — SODIUM CHLORIDE 0.9 % (FLUSH) 0.9 %
5-40 SYRINGE (ML) INJECTION EVERY 12 HOURS SCHEDULED
Status: CANCELLED | OUTPATIENT
Start: 2021-04-15

## 2021-04-15 RX ORDER — ACETAMINOPHEN 500 MG
1000 TABLET ORAL EVERY 6 HOURS PRN
Status: DISCONTINUED | OUTPATIENT
Start: 2021-04-15 | End: 2021-04-16 | Stop reason: HOSPADM

## 2021-04-15 RX ORDER — FENTANYL CITRATE 50 UG/ML
INJECTION, SOLUTION INTRAMUSCULAR; INTRAVENOUS PRN
Status: DISCONTINUED | OUTPATIENT
Start: 2021-04-15 | End: 2021-04-15 | Stop reason: SDUPTHER

## 2021-04-15 RX ORDER — HYDROMORPHONE HYDROCHLORIDE 1 MG/ML
0.25 INJECTION, SOLUTION INTRAMUSCULAR; INTRAVENOUS; SUBCUTANEOUS
Status: DISCONTINUED | OUTPATIENT
Start: 2021-04-15 | End: 2021-04-16 | Stop reason: HOSPADM

## 2021-04-15 RX ORDER — LABETALOL HYDROCHLORIDE 5 MG/ML
5 INJECTION, SOLUTION INTRAVENOUS EVERY 10 MIN PRN
Status: DISCONTINUED | OUTPATIENT
Start: 2021-04-15 | End: 2021-04-15 | Stop reason: HOSPADM

## 2021-04-15 RX ORDER — MORPHINE SULFATE 4 MG/ML
4 INJECTION, SOLUTION INTRAMUSCULAR; INTRAVENOUS EVERY 5 MIN PRN
Status: DISCONTINUED | OUTPATIENT
Start: 2021-04-15 | End: 2021-04-15 | Stop reason: HOSPADM

## 2021-04-15 RX ORDER — POLYETHYLENE GLYCOL 3350 17 G/17G
17 POWDER, FOR SOLUTION ORAL DAILY PRN
Status: DISCONTINUED | OUTPATIENT
Start: 2021-04-15 | End: 2021-04-16 | Stop reason: HOSPADM

## 2021-04-15 RX ORDER — SODIUM CHLORIDE 0.9 % (FLUSH) 0.9 %
5-40 SYRINGE (ML) INJECTION PRN
Status: DISCONTINUED | OUTPATIENT
Start: 2021-04-15 | End: 2021-04-15 | Stop reason: HOSPADM

## 2021-04-15 RX ORDER — SODIUM CHLORIDE 9 MG/ML
INJECTION, SOLUTION INTRAVENOUS CONTINUOUS
Status: DISCONTINUED | OUTPATIENT
Start: 2021-04-15 | End: 2021-04-16 | Stop reason: HOSPADM

## 2021-04-15 RX ORDER — ALOGLIPTIN 12.5 MG/1
12.5 TABLET, FILM COATED ORAL DAILY
Status: DISCONTINUED | OUTPATIENT
Start: 2021-04-16 | End: 2021-04-16 | Stop reason: HOSPADM

## 2021-04-15 RX ORDER — KETOROLAC TROMETHAMINE 30 MG/ML
15 INJECTION, SOLUTION INTRAMUSCULAR; INTRAVENOUS EVERY 8 HOURS PRN
Status: DISCONTINUED | OUTPATIENT
Start: 2021-04-15 | End: 2021-04-16 | Stop reason: HOSPADM

## 2021-04-15 RX ORDER — ONDANSETRON 2 MG/ML
INJECTION INTRAMUSCULAR; INTRAVENOUS PRN
Status: DISCONTINUED | OUTPATIENT
Start: 2021-04-15 | End: 2021-04-15 | Stop reason: SDUPTHER

## 2021-04-15 RX ORDER — SODIUM CHLORIDE 9 MG/ML
INJECTION, SOLUTION INTRAVENOUS CONTINUOUS
Status: DISCONTINUED | OUTPATIENT
Start: 2021-04-15 | End: 2021-04-15

## 2021-04-15 RX ORDER — DIPHENHYDRAMINE HYDROCHLORIDE 50 MG/ML
12.5 INJECTION INTRAMUSCULAR; INTRAVENOUS
Status: DISCONTINUED | OUTPATIENT
Start: 2021-04-15 | End: 2021-04-15 | Stop reason: HOSPADM

## 2021-04-15 RX ORDER — MIDAZOLAM HYDROCHLORIDE 1 MG/ML
INJECTION INTRAMUSCULAR; INTRAVENOUS PRN
Status: DISCONTINUED | OUTPATIENT
Start: 2021-04-15 | End: 2021-04-15 | Stop reason: SDUPTHER

## 2021-04-15 RX ORDER — GLYBURIDE 5 MG/1
10 TABLET ORAL 2 TIMES DAILY WITH MEALS
Status: DISCONTINUED | OUTPATIENT
Start: 2021-04-16 | End: 2021-04-16 | Stop reason: HOSPADM

## 2021-04-15 RX ORDER — ONDANSETRON 2 MG/ML
4 INJECTION INTRAMUSCULAR; INTRAVENOUS EVERY 4 HOURS PRN
Status: CANCELLED | OUTPATIENT
Start: 2021-04-15

## 2021-04-15 RX ORDER — SODIUM CHLORIDE 0.9 % (FLUSH) 0.9 %
5-40 SYRINGE (ML) INJECTION EVERY 12 HOURS SCHEDULED
Status: DISCONTINUED | OUTPATIENT
Start: 2021-04-15 | End: 2021-04-15 | Stop reason: HOSPADM

## 2021-04-15 RX ORDER — FUROSEMIDE 20 MG/1
20 TABLET ORAL DAILY
Status: DISCONTINUED | OUTPATIENT
Start: 2021-04-16 | End: 2021-04-16 | Stop reason: HOSPADM

## 2021-04-15 RX ORDER — BISACODYL 10 MG
10 SUPPOSITORY, RECTAL RECTAL DAILY PRN
Status: DISCONTINUED | OUTPATIENT
Start: 2021-04-15 | End: 2021-04-16 | Stop reason: HOSPADM

## 2021-04-15 RX ORDER — HYDROMORPHONE HYDROCHLORIDE 1 MG/ML
0.25 INJECTION, SOLUTION INTRAMUSCULAR; INTRAVENOUS; SUBCUTANEOUS EVERY 5 MIN PRN
Status: DISCONTINUED | OUTPATIENT
Start: 2021-04-15 | End: 2021-04-15 | Stop reason: HOSPADM

## 2021-04-15 RX ORDER — SODIUM CHLORIDE 9 MG/ML
INJECTION, SOLUTION INTRAVENOUS CONTINUOUS
Status: CANCELLED | OUTPATIENT
Start: 2021-04-15

## 2021-04-15 RX ORDER — ATROPA BELLADONNA AND OPIUM 16.2; 6 MG/1; MG/1
SUPPOSITORY RECTAL PRN
Status: DISCONTINUED | OUTPATIENT
Start: 2021-04-15 | End: 2021-04-15 | Stop reason: HOSPADM

## 2021-04-15 RX ORDER — LIDOCAINE HYDROCHLORIDE 10 MG/ML
INJECTION, SOLUTION EPIDURAL; INFILTRATION; INTRACAUDAL; PERINEURAL PRN
Status: DISCONTINUED | OUTPATIENT
Start: 2021-04-15 | End: 2021-04-15 | Stop reason: SDUPTHER

## 2021-04-15 RX ORDER — POLYETHYLENE GLYCOL 3350 17 G/17G
17 POWDER, FOR SOLUTION ORAL DAILY PRN
Status: DISCONTINUED | OUTPATIENT
Start: 2021-04-15 | End: 2021-04-15 | Stop reason: HOSPADM

## 2021-04-15 RX ORDER — BENZONATATE 100 MG/1
100 CAPSULE ORAL 3 TIMES DAILY PRN
Status: DISCONTINUED | OUTPATIENT
Start: 2021-04-15 | End: 2021-04-16 | Stop reason: HOSPADM

## 2021-04-15 RX ORDER — HYDROMORPHONE HYDROCHLORIDE 1 MG/ML
0.5 INJECTION, SOLUTION INTRAMUSCULAR; INTRAVENOUS; SUBCUTANEOUS
Status: DISCONTINUED | OUTPATIENT
Start: 2021-04-15 | End: 2021-04-16 | Stop reason: HOSPADM

## 2021-04-15 RX ORDER — MEPERIDINE HYDROCHLORIDE 50 MG/ML
12.5 INJECTION INTRAMUSCULAR; INTRAVENOUS; SUBCUTANEOUS EVERY 5 MIN PRN
Status: DISCONTINUED | OUTPATIENT
Start: 2021-04-15 | End: 2021-04-15 | Stop reason: HOSPADM

## 2021-04-15 RX ORDER — FENTANYL CITRATE 50 UG/ML
50 INJECTION, SOLUTION INTRAMUSCULAR; INTRAVENOUS
Status: DISCONTINUED | OUTPATIENT
Start: 2021-04-15 | End: 2021-04-15 | Stop reason: HOSPADM

## 2021-04-15 RX ORDER — POLYETHYLENE GLYCOL 3350 17 G/17G
17 POWDER, FOR SOLUTION ORAL DAILY PRN
Status: CANCELLED | OUTPATIENT
Start: 2021-04-15

## 2021-04-15 RX ORDER — ACETAMINOPHEN 325 MG/1
650 TABLET ORAL EVERY 6 HOURS PRN
Status: DISCONTINUED | OUTPATIENT
Start: 2021-04-15 | End: 2021-04-15 | Stop reason: HOSPADM

## 2021-04-15 RX ORDER — CIPROFLOXACIN 2 MG/ML
400 INJECTION, SOLUTION INTRAVENOUS EVERY 12 HOURS
Status: DISCONTINUED | OUTPATIENT
Start: 2021-04-16 | End: 2021-04-16 | Stop reason: HOSPADM

## 2021-04-15 RX ORDER — ACETAMINOPHEN 325 MG/1
650 TABLET ORAL EVERY 6 HOURS PRN
Status: DISCONTINUED | OUTPATIENT
Start: 2021-04-15 | End: 2021-04-16 | Stop reason: HOSPADM

## 2021-04-15 RX ORDER — CIPROFLOXACIN 2 MG/ML
400 INJECTION, SOLUTION INTRAVENOUS
Status: CANCELLED | OUTPATIENT
Start: 2021-04-15 | End: 2021-04-15

## 2021-04-15 RX ORDER — ONDANSETRON 2 MG/ML
4 INJECTION INTRAMUSCULAR; INTRAVENOUS EVERY 4 HOURS PRN
Status: DISCONTINUED | OUTPATIENT
Start: 2021-04-15 | End: 2021-04-15 | Stop reason: HOSPADM

## 2021-04-15 RX ADMIN — LIDOCAINE HYDROCHLORIDE 5 ML: 10 INJECTION, SOLUTION EPIDURAL; INFILTRATION; INTRACAUDAL; PERINEURAL at 19:10

## 2021-04-15 RX ADMIN — CIPROFLOXACIN 400 MG: 2 INJECTION INTRAVENOUS at 19:16

## 2021-04-15 RX ADMIN — PROPOFOL 190 MG: 10 INJECTION, EMULSION INTRAVENOUS at 19:10

## 2021-04-15 RX ADMIN — MORPHINE SULFATE 4 MG: 4 INJECTION, SOLUTION INTRAMUSCULAR; INTRAVENOUS at 20:42

## 2021-04-15 RX ADMIN — MIDAZOLAM 2 MG: 1 INJECTION INTRAMUSCULAR; INTRAVENOUS at 19:10

## 2021-04-15 RX ADMIN — MEPERIDINE HYDROCHLORIDE 25 MG: 50 INJECTION, SOLUTION INTRAMUSCULAR; INTRAVENOUS; SUBCUTANEOUS at 20:00

## 2021-04-15 RX ADMIN — CIPROFLOXACIN 400 MG: 2 INJECTION, SOLUTION INTRAVENOUS at 17:41

## 2021-04-15 RX ADMIN — HYOSCYAMINE SULFATE 125 MCG: 0.12 TABLET, SOLUBLE ORAL at 20:44

## 2021-04-15 RX ADMIN — MEPERIDINE HYDROCHLORIDE 25 MG: 50 INJECTION, SOLUTION INTRAMUSCULAR; INTRAVENOUS; SUBCUTANEOUS at 20:05

## 2021-04-15 RX ADMIN — OXYCODONE HYDROCHLORIDE AND ACETAMINOPHEN 1 TABLET: 5; 325 TABLET ORAL at 22:08

## 2021-04-15 RX ADMIN — FENTANYL CITRATE 100 MCG: 50 INJECTION, SOLUTION INTRAMUSCULAR; INTRAVENOUS at 19:10

## 2021-04-15 RX ADMIN — SUGAMMADEX 218 MG: 100 INJECTION, SOLUTION INTRAVENOUS at 19:36

## 2021-04-15 RX ADMIN — ONDANSETRON HYDROCHLORIDE 4 MG: 2 INJECTION, SOLUTION INTRAMUSCULAR; INTRAVENOUS at 19:19

## 2021-04-15 RX ADMIN — SODIUM CHLORIDE: 9 INJECTION, SOLUTION INTRAVENOUS at 22:11

## 2021-04-15 RX ADMIN — SODIUM CHLORIDE: 9 INJECTION, SOLUTION INTRAVENOUS at 17:15

## 2021-04-15 ASSESSMENT — PAIN SCALES - GENERAL
PAINLEVEL_OUTOF10: 0
PAINLEVEL_OUTOF10: 5

## 2021-04-15 ASSESSMENT — ENCOUNTER SYMPTOMS
EYES NEGATIVE: 1
RESPIRATORY NEGATIVE: 1
SHORTNESS OF BREATH: 0
GASTROINTESTINAL NEGATIVE: 1

## 2021-04-15 ASSESSMENT — LIFESTYLE VARIABLES: SMOKING_STATUS: 0

## 2021-04-15 NOTE — PROGRESS NOTES
urgency, frequency and decreased force of stream, straining feeling of incomplete emptying intermittency. He rates his symptoms as severe his AUA symptom score is 22. He had been treated in the past for a urinary tract infection. He appeared to be treated at [facility] with Levaquin and Flagyl as well as oxybutynin. He was previously on tamsulosin and Avodart but I think he is no longer taking these.            Cystoscopy Procedure Note    Indications: Diagnosis    Pre-operative Diagnosis: Hematuria    Post-operative Diagnosis: Hematuria    Surgeon: Renetta Garcia MD     Assistants: staff    Anesthesia: Local anesthesia topical 2% lidocaine gel    Procedure Details   The risks, benefits, complications, treatment options, and expected outcomes were discussed with the patient. The patient concurred with the proposed plan, giving informed consent. Cystoscopy was performed today under local anesthesia, using sterile technique. The patient was placed in the supine position, prepped with Hibiclens, and draped in the usual sterile fashion. A 17 [place] sheath flexible cystoscope was used to inspect both the urethra and bladder using the flexible scope. Findings:  Anterior urethra: abnormal:  with strictures and with scarring. Urethral dilation   I could not pass a flexible cystoscope therefore a Amplatz Super Stiff guidewire was passed through the stricture and into the bladder. Then over the guidewire I dilated him with [place] Devin dilators from 15 [place] up to 23 [place] I could not get the 20 [place] dilator to dilate. Prostate:  Prostatic urethra: 1+ mild prostatic hyperplasia. Bladder: There is a large clot within the bladder unable to visualize the bladder due to grossly bloody urine. Bladder spasms were encountered from the patient and this also made visualization difficult. Therefore cystoscopy was concluded and aborted.   The visualized portions of the bladder no obvious lesions are seen and I do not see the ureteral orifice ease. I did place an 25 [place] coudé catheter and attempted to irrigate this but due to large blood clot in the bladder I could not get this irrigate therefore the catheter was removed. Complications:  None; patient tolerated the procedure well. Disposition: To home after observation. Condition: stable      DATA:  CBC:   Lab Results   Component Value Date    WBC 6.17 09/11/2020    RBC 4.13 09/11/2020    HGB 14.1 09/11/2020    HCT 43.1 09/11/2020    .4 09/11/2020    MCH 34.1 09/11/2020    MCHC 32.7 09/11/2020    RDW 14.1 09/11/2020     09/11/2020    MPV 9.7 09/11/2020     CMP:    Lab Results   Component Value Date     01/11/2021    K 5.0 01/11/2021     01/11/2021    CO2 21 01/11/2021    BUN 18 01/11/2021    CREATININE 1.04 01/11/2021    GFRAA 81 01/11/2021    AGRATIO 1.9 01/11/2021    LABGLOM 70 01/11/2021    GLUCOSE 106 01/11/2021    PROT 7.0 01/11/2021    LABALBU 4.6 01/11/2021    CALCIUM 9.2 01/11/2021    BILITOT 0.3 01/11/2021    ALKPHOS 62 01/11/2021    AST 24 01/11/2021    ALT 25 01/11/2021     No results found for this visit on 04/15/21. IMAGING:  I reviewed the CT scan of the abdomen pelvis from January the shows normal kidneys bilaterally no stones no masses no hydronephrosis    1. Gross hematuria  Was unable to complete cystoscopy today as described above due to large blood clot in the bladder and poor visualization. Patient be mated to the hospital to undergo cystoscopy clot evacuation and fulguration of bleeding.  - Cystoscopy  - W[place] INSERT,NON-INDWELLING BLADDER CATHETER  - IRRIGATION OF BLADDER    2. Dysuria  Probably related to his stricture and prior radiation    3. Prostate cancer ([facility] [address]  Most recent PSA was undetectable    4.  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 We will complete dilation under anesthetic in the hospital today when we do his clot evacuation.  - CYSTOSCOPY,DIL URETHRAL STRICTURE        Orders Placed This Encounter   Procedures    ND INSERT,NON-INDWELLING BLADDER CATHETER    IRRIGATION OF BLADDER    CYSTOSCOPY,DIL URETHRAL STRICTURE    Cystoscopy     Done today     Scheduling Instructions:      today        Return for Patient admitted to hospital for observation to undergo surgery.

## 2021-04-15 NOTE — ANESTHESIA PRE PROCEDURE
Department of Anesthesiology  Preprocedure Note       Name:  Sharon Lopez   Age:  77 y.o.  :  1945                                          MRN:  [id number]         Date:  4/15/2021      Surgeon: Tiesha Velasquez):  Dilip Armendariz MD    Procedure: Procedure(s):  CYSTOSCOPY, URETHRAL DILATATION, CLOT EVACUATION AND FULGURATION OF BLEEDING    Medications prior to admission:   Prior to Admission medications    Medication Sig Start Date End Date Taking? Authorizing Provider   furosemide (LASIX) 20 MG tablet Take 20 mg by mouth daily   Yes Historical Provider, MD   benzonatate (TESSALON) 100 MG capsule Take 100 mg by mouth 3 times daily as needed for Cough   Yes Historical Provider, MD   TRUE METRIX BLOOD GLUCOSE TEST strip USE 1 STRIP TO [place] [address] DAILY 20  Yes Historical Provider, MD   Travoprost, BAK Free, (TRAVATAN Z) 0.004 % SOLN ophthalmic solution Travatan Z 0.004 % eye drops   INSTILL 1 DROP INTO EACH EYE AT BEDTIME   Yes Historical Provider, MD   pantoprazole (PROTONIX) 40 MG tablet TAKE 1 TABLET BY MOUTH ONCE DAILY 20  Yes Historical Provider, MD   verapamil (VERELAN) 240 MG extended release capsule daily  19  Yes Historical Provider, MD   rivaroxaban (XARELTO) 20 MG TABS tablet Take 20 mg by mouth daily    Yes Historical Provider, MD   glipiZIDE (GLUCOTROL) 5 MG tablet Take 5 mg by mouth daily  19  Yes Historical Provider, MD   meclizine (ANTIVERT) 25 MG tablet Take 25 mg by mouth 3 times daily as needed    Yes Historical Provider, MD   SITagliptin (JANUVIA) 100 MG tablet Take 100 mg by mouth daily  19  Yes Historical Provider, MD   sertraline (ZOLOFT) 100 MG tablet daily  19  Yes Historical Provider, MD   lisinopril (PRINIVIL;ZESTRIL) 5 MG tablet daily  19  Yes Historical Provider, MD   cetirizine (ZYRTEC ALLERGY) 10 MG tablet Zyrtec 10 mg tablet   Take 1 tablet every day by oral route.    Yes Historical Provider, MD   rizatriptan (MAXALT) 10 MG tablet as needed 8/22/19  Yes Historical Provider, MD   cefdinir (OMNICEF) 300 MG capsule Take 1 capsule by mouth 2 times daily for 10 days 4/12/21 4/22/21  JACKELYN Boggs - CNP   magnesium 30 MG tablet Take 30 mg by mouth 2 times daily    Historical Provider, MD   megestrol (MEGACE) 20 MG tablet Take 1 tablet by mouth daily 12/17/20   Jeanna Tee MD   gabapentin (NEURONTIN) 100 MG capsule gabapentin 100 mg capsule    Historical Provider, MD   glyBURIDE (DIABETA) 5 MG tablet glyburide 5 mg tablet   Take 2 tablets twice a day by oral route. Historical Provider, MD   zoster recombinant adjuvanted vaccine (SHINGRIX) 50 MCG/0.5ML SUSR injection Shingrix (PF) 50 mcg/0.5 mL intramuscular suspension, kit    Historical Provider, MD       Current medications:    Current Facility-Administered Medications   Medication Dose Route Frequency Provider Last Rate Last Admin    0.9 % sodium chloride infusion   Intravenous Continuous Jeanna Tee MD        0.9 % sodium chloride infusion  25 mL Intravenous PRN Jeanna Tee MD        acetaminophen (TYLENOL) tablet 650 mg  650 mg Oral Q6H PRN Jeanna Tee MD        Or    acetaminophen (TYLENOL) suppository 650 mg  650 mg Rectal Q6H PRN Jeanna Tee MD        ondansetron [facility]) injection 4 mg  4 mg Intravenous Q4H PRN Jeanna Tee MD        polyethylene glycol [facility]) packet 17 g  17 g Oral Daily PRN Jeanna Tee MD        sodium chloride flush 0.9 % injection 5-40 mL  5-40 mL Intravenous 2 times per day Jeanna Tee MD        sodium chloride flush 0.9 % injection 5-40 mL  5-40 mL Intravenous PRN Jeanna Tee MD        ciprofloxacin (CIPRO) IVPB 400 mg  400 mg Intravenous On Call to Gaurav Marsh MD           Allergies:     Allergies   Allergen Reactions    Ticlopidine Hives and Swelling           Flagyl [Metronidazole]      weakness       Problem List:    Patient Active Problem List   Diagnosis Code    Prostate cancer (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        Past Surgical History:        Procedure Laterality Date    APPENDECTOMY      JOINT REPLACEMENT  2014    LAPAROTOMY      Resection of retroperitoneal lymphadenopathy    SHOULDER SURGERY  2016    VASECTOMY  1979       Social History:    Social History     Tobacco Use    Smoking status: Never Smoker    Smokeless tobacco: Never Used   Substance Use Topics    Alcohol use: Not on file                                Counseling given: Not Answered      Vital Signs (Current):   Vitals:    04/15/21 1216   BP: (!) 148/60   Pulse: 75   Resp: 20   Temp: 97.3 °F (36.3 °C)   TempSrc: Temporal   SpO2: 97%                                              BP Readings from Last 3 Encounters:   04/15/21 (!) 148/60   04/12/21 (!) 147/79   09/11/20 (!) 142/68       NPO Status:                                                                                 BMI:   Wt Readings from Last 3 Encounters:   04/12/21 239 lb (108.4 kg)   12/17/20 238 lb (108 kg)   09/11/20 237 lb 3.2 oz (107.6 kg)     There is no height or weight on file to calculate BMI.    CBC:   Lab Results   Component Value Date    WBC 3.7 04/15/2021    RBC 3.93 04/15/2021    HGB 13.1 04/15/2021    HCT 39.2 04/15/2021    MCV 99.7 04/15/2021    RDW 14.2 04/15/2021     04/15/2021       CMP:   Lab Results   Component Value Date     04/15/2021    K 4.5 04/15/2021     04/15/2021    CO2 25 04/15/2021    BUN 25 04/15/2021    CREATININE 0.8 04/15/2021    GFRAA >59 04/15/2021    AGRATIO 1.9 01/11/2021    LABGLOM >60 04/15/2021    GLUCOSE 110 04/15/2021    PROT 6.9 04/15/2021    CALCIUM 9.7 04/15/2021    BILITOT 0.3 04/15/2021    ALKPHOS 72 04/15/2021    AST 37 04/15/2021    ALT 34 04/15/2021       POC Tests: No results for input(s): POCGLU, POCNA, POCK, POCCL, POCBUN, POCHEMO, POCHCT in the last 72 hours. Coags:   Lab Results   Component Value Date    PROTIME 12.9 04/15/2021    INR 0.98 04/15/2021    APTT 29.3 04/15/2021       HCG (If Applicable): No results found for: PREGTESTUR, PREGSERUM, HCG, HCGQUANT     ABGs: No results found for: PHART, PO2ART, RWP3XDT, VTU0LSY, BEART, X3QDRSZK     Type & Screen (If Applicable):  No results found for: LABABO, LABRH    Drug/Infectious Status (If Applicable):  No results found for: HIV, HEPCAB    COVID-19 Screening (If Applicable):   Lab Results   Component Value Date    COVID19 Not Detected 04/15/2021           Anesthesia Evaluation  Patient summary reviewed and Nursing notes reviewed no history of anesthetic complications:   Airway: Mallampati: II  TM distance: >3 FB   Neck ROM: full  Mouth opening: > = 3 FB Dental: normal exam         Pulmonary:Negative Pulmonary ROS and normal exam  breath sounds clear to auscultation  (+) sleep apnea:      (-) shortness of breath and not a current smoker          Patient did not smoke on day of surgery. Cardiovascular:    (+) hypertension:,     (-) CAD,  angina and  CHF    NYHA Classification: I  ECG reviewed  Rhythm: regular  Rate: normal           Beta Blocker:  Not on Beta Blocker         Neuro/Psych:   Negative Neuro/Psych ROS  (+) TIA,    (-) seizures, CVA and depression/anxiety            GI/Hepatic/Renal: Neg GI/Hepatic/Renal ROS  (+) morbid obesity     (-) hiatal hernia and GERD       Endo/Other: Negative Endo/Other ROS   (+) DiabetesType II DM, , blood dyscrasia: anemia and anticoagulation therapy:., malignancy/cancer. Pt had PAT visit. Abdominal:   (+) obese,     Abdomen: soft.     Vascular:                                      Anesthesia Plan      general     ASA 3     (Iv zofran within 30 min of closing )  Induction: intravenous. BIS  MIPS: Postoperative opioids intended and Prophylactic antiemetics administered. Anesthetic plan and risks discussed with patient. Use of blood products discussed with patient whom. Plan discussed with CRNA.     Attending anesthesiologist reviewed and agrees with Pre Eval content              Sandrine Aguilar MD   4/15/2021

## 2021-04-16 VITALS
OXYGEN SATURATION: 92 % | SYSTOLIC BLOOD PRESSURE: 113 MMHG | TEMPERATURE: 97.4 F | DIASTOLIC BLOOD PRESSURE: 53 MMHG | HEART RATE: 72 BPM | RESPIRATION RATE: 20 BRPM

## 2021-04-16 LAB
ANION GAP SERPL CALCULATED.3IONS-SCNC: 11 MMOL/L (ref 7–19)
BASOPHILS ABSOLUTE: 0 K/UL (ref 0–0.2)
BASOPHILS RELATIVE PERCENT: 0.4 % (ref 0–1)
BUN BLDV-MCNC: 18 MG/DL (ref 8–23)
CALCIUM SERPL-MCNC: 8.8 MG/DL (ref 8.8–10.2)
CHLORIDE BLD-SCNC: 104 MMOL/L (ref 98–111)
CO2: 25 MMOL/L (ref 22–29)
CREAT SERPL-MCNC: 0.9 MG/DL (ref 0.5–1.2)
EOSINOPHILS ABSOLUTE: 0.1 K/UL (ref 0–0.6)
EOSINOPHILS RELATIVE PERCENT: 2.2 % (ref 0–5)
GFR AFRICAN AMERICAN: >59
GFR NON-AFRICAN AMERICAN: >60
GLUCOSE BLD-MCNC: 134 MG/DL (ref 74–109)
GLUCOSE BLD-MCNC: 144 MG/DL (ref 70–99)
HCT VFR BLD CALC: 35.5 % (ref 42–52)
HEMOGLOBIN: 11.4 G/DL (ref 14–18)
IMMATURE GRANULOCYTES #: 0 K/UL
LYMPHOCYTES ABSOLUTE: 1 K/UL (ref 1.1–4.5)
LYMPHOCYTES RELATIVE PERCENT: 20 % (ref 20–40)
MCH RBC QN AUTO: 32.6 PG (ref 27–31)
MCHC RBC AUTO-ENTMCNC: 32.1 G/DL (ref 33–37)
MCV RBC AUTO: 101.4 FL (ref 80–94)
MONOCYTES ABSOLUTE: 0.9 K/UL (ref 0–0.9)
MONOCYTES RELATIVE PERCENT: 18.6 % (ref 0–10)
NEUTROPHILS ABSOLUTE: 2.9 K/UL (ref 1.5–7.5)
NEUTROPHILS RELATIVE PERCENT: 58.6 % (ref 50–65)
PDW BLD-RTO: 14.2 % (ref 11.5–14.5)
PERFORMED ON: ABNORMAL
PLATELET # BLD: 146 K/UL (ref 130–400)
PMV BLD AUTO: 9.3 FL (ref 9.4–12.4)
POTASSIUM REFLEX MAGNESIUM: 4.3 MMOL/L (ref 3.5–5)
RBC # BLD: 3.5 M/UL (ref 4.7–6.1)
SODIUM BLD-SCNC: 140 MMOL/L (ref 136–145)
WBC # BLD: 5 K/UL (ref 4.8–10.8)

## 2021-04-16 PROCEDURE — 2580000003 HC RX 258: Performed by: UROLOGY

## 2021-04-16 PROCEDURE — G0378 HOSPITAL OBSERVATION PER HR: HCPCS

## 2021-04-16 PROCEDURE — 80048 BASIC METABOLIC PNL TOTAL CA: CPT

## 2021-04-16 PROCEDURE — 36415 COLL VENOUS BLD VENIPUNCTURE: CPT

## 2021-04-16 PROCEDURE — 6370000000 HC RX 637 (ALT 250 FOR IP): Performed by: UROLOGY

## 2021-04-16 PROCEDURE — 85025 COMPLETE CBC W/AUTO DIFF WBC: CPT

## 2021-04-16 PROCEDURE — 6360000002 HC RX W HCPCS: Performed by: UROLOGY

## 2021-04-16 PROCEDURE — 82947 ASSAY GLUCOSE BLOOD QUANT: CPT

## 2021-04-16 PROCEDURE — 99217 PR OBSERVATION CARE DISCHARGE MANAGEMENT: CPT | Performed by: UROLOGY

## 2021-04-16 RX ORDER — OXYCODONE HYDROCHLORIDE AND ACETAMINOPHEN 5; 325 MG/1; MG/1
1 TABLET ORAL EVERY 6 HOURS PRN
Qty: 20 TABLET | Refills: 0 | Status: SHIPPED | OUTPATIENT
Start: 2021-04-16 | End: 2021-04-20 | Stop reason: HOSPADM

## 2021-04-16 RX ORDER — CEFDINIR 300 MG/1
300 CAPSULE ORAL 2 TIMES DAILY
Qty: 20 CAPSULE | Refills: 0 | Status: SHIPPED | OUTPATIENT
Start: 2021-04-16 | End: 2021-04-26

## 2021-04-16 RX ADMIN — PANTOPRAZOLE SODIUM 40 MG: 40 TABLET, DELAYED RELEASE ORAL at 09:47

## 2021-04-16 RX ADMIN — OXYCODONE HYDROCHLORIDE AND ACETAMINOPHEN 1 TABLET: 5; 325 TABLET ORAL at 05:34

## 2021-04-16 RX ADMIN — CIPROFLOXACIN 400 MG: 2 INJECTION, SOLUTION INTRAVENOUS at 05:35

## 2021-04-16 RX ADMIN — GLIPIZIDE 5 MG: 5 TABLET ORAL at 09:47

## 2021-04-16 RX ADMIN — FUROSEMIDE 20 MG: 20 TABLET ORAL at 09:47

## 2021-04-16 RX ADMIN — SODIUM CHLORIDE: 9 INJECTION, SOLUTION INTRAVENOUS at 04:39

## 2021-04-16 RX ADMIN — OXYCODONE HYDROCHLORIDE AND ACETAMINOPHEN 1 TABLET: 5; 325 TABLET ORAL at 14:27

## 2021-04-16 RX ADMIN — ALOGLIPTIN 12.5 MG: 12.5 TABLET, FILM COATED ORAL at 09:47

## 2021-04-16 RX ADMIN — MEGESTROL ACETATE 20 MG: 40 TABLET ORAL at 09:47

## 2021-04-16 RX ADMIN — SERTRALINE 100 MG: 100 TABLET, FILM COATED ORAL at 09:47

## 2021-04-16 ASSESSMENT — PAIN SCALES - GENERAL: PAINLEVEL_OUTOF10: 5

## 2021-04-16 NOTE — OP NOTE
Operative Note      Patient: Shelton James  YOB: 1945  MRN: [id number]    Date of Procedure: 4/15/2021    Pre-Op Diagnosis: 1. Gross hematuria 2. Bulbar urethral stricture    Post-Op Diagnosis 1 and 2 Same 3. Bladder cancer left lateral wall       Procedure(s):  CYSTOSCOPY, BILATERAL URETERAL CATHETERIZATIONS WITH RETROGRADES, URETHRAL DILATATION, CLOT EVACUATION AND FULGURATION OF BLEEDING, TRANSURETHRAL RESECTION OF BLADDER TUMOR    Surgeon(s):  MD Thanh Rodriges MD    Assistant:   * No surgical staff found *    Anesthesia: General    Estimated Blood Loss (mL): 20    Complications: None    Specimens:   ID Type Source Tests Collected by Time Destination   A : BLADDER TUMOR LEFT LATERAL WALL Tissue Bladder SURGICAL PATHOLOGY Raissa Mary MD 4/15/2021 1936        Implants:  * No implants in log *      Drains:   Urethral Catheter Triple-lumen 22 fr (Active)       Findings: Bulbar urethral stricture easily dilated with Ane Mura sounds to [address] [place]. Prostatic fossa widely open no significant obstruction HERNANDO prostate 35 g without nodule. Normal bilateral retrograde pyelograms. Small amount of blood clot evacuated from the bladder. Patient was bleeding from a papillary bladder cancer involving the left lateral wall. This was completely resected and fulgurated. No other papillary tumors were seen.   Patient did have some moderate trabeculation  22 Citizen of [place] three-way Valle with 10 cc balloon to CBI with normal saline    Detailed Description of Procedure:   See dictated PATPUF:[id number]    Disposition to PACU then back to fifth floor observation overnight on CBI hopefully can wean off in home with Valle tomorrow    Electronically signed by Ruel Salinas MD on 4/15/2021 at 8:00 PM

## 2021-04-16 NOTE — OP NOTE
[employer] OF [facility] STACI [address] [facility]                                OPERATIVE REPORT    PATIENT NAME: Ajay Wright                   :        1945  MED REC NO:   [id number]                              ROOM:       [facility]  ACCOUNT NO:   [de-identified]                           ADMIT DATE: 04/15/2021  PROVIDER:     Jose Blackwell MD    DATE OF PROCEDURE:  04/15/2021    RADIOGRAPHIC INTERPRETATION OF BILATERAL RETROGRADE PYELOGRAMS    1. Right retrograde pyelogram.    INTERPRETATION:  The right ureter was intubated with ureteral catheter,  contrast injected retrograde. This showed a normal-appearing right  ureter and upper collecting system. CONCLUSION:  Normal right ureter and upper collecting system. Normal  retrograde pyelogram.    2.  Left retrograde pyelogram.    INTERPRETATION:  The left ureter was intubated with ureteral catheter,  contrast injected retrograde. There were some oval rounded filling  defects in the proximal ureter consistent with air bubbles that were  seen freely floating; otherwise, there were no other filling defects,  normal-appearing ureter. There was no hydronephrosis or obstruction. Normal renal pelvis.     CONCLUSION:  Normal left retrograde pyelogram.        Ryne Sanford MD    D: 04/15/2021 21:15:55      T: 2021 3:35:47     [email or web address]  Job#: [id number]     Doc#: [id number]    CC:

## 2021-04-16 NOTE — OP NOTE
[employer] OF [facility] AARTI [address], [address] [facility]                                OPERATIVE REPORT    PATIENT NAME: Brynn Vail                   :        1945  MED REC NO:   [id number]                              ROOM:       [facility]  ACCOUNT NO:   [de-identified]                           ADMIT DATE: 04/15/2021  PROVIDER:     Con Anderson MD    DATE OF PROCEDURE:  04/15/2021    TITLE OF OPERATION:  1. Cystoscopy, dilation of bulbar urethral stricture. 2.  Cystoscopy, bilateral ureteral catheterization; bilateral retrograde  pyelograms. 3.  Clot evacuation and fulguration of bleeding. 4.  Transurethral resection of bladder tumor, 2 to 5 cm. PREOPERATIVE DIAGNOSES:  1.  Gross hematuria. 2.  Bulbar urethral stricture. POSTOPERATIVE DIAGNOSES:  1.  Gross hematuria. 2.  Bulbar urethral stricture. 3.  Bladder cancer, left lateral wall. ANESTHETIC:  General anesthetic. ATTENDING SURGEON:  Con Anderson MD    HISTORY:  The patient is a 73-year-old gentleman who I follow for  prostrate cancer. He was just seen recently for followup of his  prostate cancer back in March. He has had radiation therapy that was  completed on 09/10/2020 and he now has an undetectable PSA. Approximately one week ago, he began having gross hematuria. He does  take Xarelto because of a prior history of CVA. He does have diabetes  and hypertension. His primary care doctor stopped the Xarelto, but he  continued to have hematuria and he came in today to the office for  cystoscopy. A urine culture done on 2021 was negative, less than  10,000 mixed skin and  sinan. His coags were within normal limits. His H and H were 13.1 and 39.2. He recently had a CT scan of the  abdomen and pelvis on 2021 where he was followed by his medical  oncologist for lymphoma and this showed basically normal-appearing  kidneys bilaterally.   Because of the hematuria, we a 30 and 70-degree lens and besides just some  moderate trabeculation, I saw no other papillary tumors. There was a  little bit of area where the mucosa was a little friable, but no  ulcerations and no significant findings to suggest diffuse radiation  cystitis. I did perform bilateral retrograde pyelograms. A 5-Micronesian catheter was  used to intubate the ureteral orifices bilaterally. This was done under  direct cystoscopic visualization. Contrast was injected retrograde and  this showed normal-appearing bilateral retrograde pyelograms. I then withdrew the cystoscope and then I dilated the urethra further  with Jerline Asp sounds from 20-Micronesian up to 30-Micronesian. This then allowed  me to place a 26 continuous-flow resectoscope sheath without difficulty. Once I did this, I then did a transurethral resection of the bladder  tumor involving the left lateral wall down to visible muscle that  appeared to be very superficial.  I made sure I did take some cuts  enough to get down to the muscle. The edges of the resection were  fulgurated and cauterized as well as the base of the resection. There  appeared to be no significant active bleeding. The specimen was  collected and sent for pathologic examination. I then withdrew the  resectoscope. A 22-Micronesian three-way Valle catheter was then inserted;  10 mL was placed in the balloon, this seated against the bladder neck in  good position and irrigated red-tinged urine. This was placed to  continuous bladder irrigation with normal saline. The estimated blood  loss was approximately 20 mL. The specimen was collected and sent for  path. Normal retrograde pyelograms. He was awakened from his  anesthetic and taken to the recovery room in stable condition.         Dion Beasley MD    D: 04/15/2021 21:15:55      T: 04/16/2021 3:33:54     [email or web address]  Job#: [id number]     Doc#: [id number]    CC:

## 2021-04-16 NOTE — ANESTHESIA POSTPROCEDURE EVALUATION
Department of Anesthesiology  Postprocedure Note    Patient: Leslie Cho  MRN: [id number]  YOB: 1945  Date of evaluation: 4/15/2021  Time:  7:55 PM     Procedure Summary     Date: 04/15/21 Room / Location: [facility] OR [facility]    Anesthesia Start: 1901 Anesthesia Stop: 1955    Procedure: CYSTOSCOPY, BILATERAL URETERAL CATHETERIZATIONS WITH RETROGRADES, URETHRAL DILATATION, CLOT EVACUATION AND FULGURATION OF BLEEDING, TRANSURETHRAL RESECTION OF BLADDER TUMOR (N/A ) Diagnosis: (.)    Surgeons: Ingrid Wiggins MD Responsible Provider:     Anesthesia Type: general ASA Status: 3          Anesthesia Type: general    Bruno Phase I: Bruno Score: 10    Bruno Phase II:      Last vitals: Reviewed and per EMR flowsheets. Anesthesia Post Evaluation    Patient location during evaluation: bedside  Level of consciousness: awake and alert  Airway patency: patent  Nausea & Vomiting: no nausea and no vomiting  Complications: no  Cardiovascular status: blood pressure returned to baseline  Respiratory status: acceptable and nasal cannula  Hydration status: stable  Comments: Patient transferred to Pacu, spontaneous respirations, patent airway, report given to nurse.

## 2021-04-17 NOTE — DISCHARGE SUMMARY
[facility]AN [employer] OF [facility] AARTI [address], [address] [facility]                               DISCHARGE SUMMARY    PATIENT NAME: Brynn Vail                   :        1945  MED REC NO:   [id number]                              ROOM:       [facility]  ACCOUNT NO:   [de-identified]                           ADMIT DATE: 04/15/2021  PROVIDER:     oCn Anderson MD                 [address] [place] DATE: 2021    DISCHARGE DIAGNOSES:  1.  Gross hematuria. 2.  Bladder cancer, left lateral wall. 3.  Urethral stricture. 4.  Prostate cancer, status post radiation therapy. PROCEDURES:  Done on 2021 are,  1. Cystoscopy, dilation of bulbar urethral stricture. 2.  Cystoscopy, bilateral ureteral catheterization; bilateral retrograde  pyelograms. 3.  Clot evacuation and fulguration of bleeding. 4.  Transurethral resection of bladder tumor, 2 to 5 cm. HOSPITAL COURSE:  The patient is a 73-year-old gentleman who was on  Xarelto for prior history of CVA. He has a history of prostate cancer,  status post radiation in the past.  He presented with about a 1-week  history of gross hematuria. His Xarelto was stopped by his PCP, but he  continued to have hematuria. A urine culture was sent, which was no  growth. He came to the office for cystoscopy for evaluation of his  hematuria and he was discovered to have a bulbar urethral stricture,  which I dilated in the office partially enough to get the flexible scope  in his bladder, but I was unable to see because of blood clot in his  bladder. Therefore, we admitted him to the hospital for observation and  for definitive urologic surgical intervention. He was having quite a  bit of bladder spasms. We took him to the operating room on the date of  admission where we completed the dilation of his bulbar urethral  stricture with Pinole Clara sounds up to 30-Portuguese.   I then looked inside  his bladder, he had a moderate to small amount of clot in his bladder,  which was evacuated. He was found to have a papillary tumor involving  the left lateral wall of the bladder and this was resected. The  pathology report is pending. He had had a CT scan done back in January  because of prior lymphoma, followed by Dr. Mayda Mart, and at that time,  upper tracts looked normal, but I went ahead and did retrograde  pyelograms and they also appeared normal.  It was felt his bleeding was  most likely secondary to this bladder tumor. Postoperatively, a 22-French three-way Valle catheter was left in place. This was placed to CBI, which was quickly weaned off overnight. On the  day of discharge, his urine was clear on very, very slow CBI. This was  discontinued. We observed his catheter drainage and this remained  clear. Therefore, it was felt that he could be discharged to home. He  will go home with his Valle catheter with a side port plug. He has  already been given a prescription of cefdinir, so we will have him take  this until his catheter can be removed. We will have him follow up in  the office in 7 to 10 days for a fill-and-pull. He will continue to  hold his Xarelto for 5 days. He will also be sent home on Percocet  5/325 as needed for pain. Otherwise, his discharge medications remain  the same. Please see the discharge medication reconciliation sheet. He  was discharged to home in improved and good condition. He was  instructed on the use of leg bag and bedside bag and how to empty his  catheter.         Salima Yan MD    D: 04/16/2021 8:56:54      T: 04/16/2021 11:31:49     [email or web address]  Job#: [id number]     Doc#: [id number]    CC:

## 2021-04-19 ENCOUNTER — TELEPHONE (OUTPATIENT)
Dept: UROLOGY | Age: 76
End: 2021-04-19

## 2021-04-19 NOTE — TELEPHONE ENCOUNTER
Called patient for well check after surgery (patient has become clinic favorite)    Patient wife advises that pain medication wears off before 6 hrs and is giving [address] on Thursday 4/22

## 2021-04-20 ENCOUNTER — TELEPHONE (OUTPATIENT)
Dept: INFUSION THERAPY | Age: 76
End: 2021-04-20

## 2021-04-20 ENCOUNTER — HOSPITAL ENCOUNTER (EMERGENCY)
Age: 76
Discharge: HOME | End: 2021-04-20
Attending: EMERGENCY MEDICINE
Payer: MEDICARE

## 2021-04-20 ENCOUNTER — APPOINTMENT (OUTPATIENT)
Dept: CT IMAGING | Age: 76
End: 2021-04-20
Payer: MEDICARE

## 2021-04-20 ENCOUNTER — TELEPHONE (OUTPATIENT)
Dept: UROLOGY | Age: 76
End: 2021-04-20

## 2021-04-20 VITALS
HEART RATE: 74 BPM | OXYGEN SATURATION: 94 % | RESPIRATION RATE: 18 BRPM | TEMPERATURE: 98 F | DIASTOLIC BLOOD PRESSURE: 70 MMHG | WEIGHT: 239 LBS | BODY MASS INDEX: 31.53 KG/M2 | SYSTOLIC BLOOD PRESSURE: 133 MMHG

## 2021-04-20 DIAGNOSIS — N32.89 BLADDER SPASM: Primary | ICD-10-CM

## 2021-04-20 DIAGNOSIS — N35.912 STRICTURE OF BULBOUS URETHRA IN MALE, UNSPECIFIED STRICTURE TYPE: ICD-10-CM

## 2021-04-20 DIAGNOSIS — R10.2 SUPRAPUBIC PAIN: ICD-10-CM

## 2021-04-20 DIAGNOSIS — N32.89 BLADDER SPASMS: ICD-10-CM

## 2021-04-20 DIAGNOSIS — N48.89 PENILE PAIN: ICD-10-CM

## 2021-04-20 LAB
ALBUMIN SERPL-MCNC: 4 G/DL (ref 3.5–5.2)
ALP BLD-CCNC: 72 U/L (ref 40–130)
ALT SERPL-CCNC: 22 U/L (ref 5–41)
ANION GAP SERPL CALCULATED.3IONS-SCNC: 13 MMOL/L (ref 7–19)
AST SERPL-CCNC: 22 U/L (ref 5–40)
BACTERIA: NEGATIVE /HPF
BILIRUB SERPL-MCNC: 0.4 MG/DL (ref 0.2–1.2)
BILIRUBIN URINE: NEGATIVE
BLOOD, URINE: ABNORMAL
BUN BLDV-MCNC: 18 MG/DL (ref 8–23)
CALCIUM SERPL-MCNC: 9.5 MG/DL (ref 8.8–10.2)
CHLORIDE BLD-SCNC: 101 MMOL/L (ref 98–111)
CLARITY: ABNORMAL
CO2: 25 MMOL/L (ref 22–29)
COLOR: ABNORMAL
CREAT SERPL-MCNC: 1 MG/DL (ref 0.5–1.2)
CRYSTALS, UA: ABNORMAL /HPF
EKG P AXIS: 74 DEGREES
EKG P-R INTERVAL: 180 MS
EKG Q-T INTERVAL: 464 MS
EKG QRS DURATION: 88 MS
EKG QTC CALCULATION (BAZETT): 467 MS
EKG T AXIS: 49 DEGREES
EPITHELIAL CELLS, UA: 3 /HPF (ref 0–5)
GFR AFRICAN AMERICAN: >59
GFR NON-AFRICAN AMERICAN: >60
GLUCOSE BLD-MCNC: 158 MG/DL (ref 74–109)
GLUCOSE URINE: NEGATIVE MG/DL
HCT VFR BLD CALC: 41 % (ref 42–52)
HEMOGLOBIN: 13.7 G/DL (ref 14–18)
HYALINE CASTS: 3 /HPF (ref 0–8)
KETONES, URINE: ABNORMAL MG/DL
LEUKOCYTE ESTERASE, URINE: ABNORMAL
LIPASE: 21 U/L (ref 13–60)
MCH RBC QN AUTO: 32.9 PG (ref 27–31)
MCHC RBC AUTO-ENTMCNC: 33.4 G/DL (ref 33–37)
MCV RBC AUTO: 98.6 FL (ref 80–94)
NITRITE, URINE: NEGATIVE
PDW BLD-RTO: 13.9 % (ref 11.5–14.5)
PH UA: 6 (ref 5–8)
PLATELET # BLD: 177 K/UL (ref 130–400)
PMV BLD AUTO: 9.4 FL (ref 9.4–12.4)
POTASSIUM REFLEX MAGNESIUM: 3.9 MMOL/L (ref 3.5–5)
PROTEIN UA: 300 MG/DL
RBC # BLD: 4.16 M/UL (ref 4.7–6.1)
RBC UA: >900 /HPF (ref 0–4)
SODIUM BLD-SCNC: 139 MMOL/L (ref 136–145)
SPECIFIC GRAVITY UA: 1.04 (ref 1–1.03)
TOTAL PROTEIN: 7.5 G/DL (ref 6.6–8.7)
UROBILINOGEN, URINE: 1 E.U./DL
WBC # BLD: 3.4 K/UL (ref 4.8–10.8)
WBC UA: 8 /HPF (ref 0–5)

## 2021-04-20 PROCEDURE — 96375 TX/PRO/DX INJ NEW DRUG ADDON: CPT

## 2021-04-20 PROCEDURE — 6370000000 HC RX 637 (ALT 250 FOR IP): Performed by: EMERGENCY MEDICINE

## 2021-04-20 PROCEDURE — 36415 COLL VENOUS BLD VENIPUNCTURE: CPT

## 2021-04-20 PROCEDURE — 83690 ASSAY OF LIPASE: CPT

## 2021-04-20 PROCEDURE — 85027 COMPLETE CBC AUTOMATED: CPT

## 2021-04-20 PROCEDURE — 99283 EMERGENCY DEPT VISIT LOW MDM: CPT

## 2021-04-20 PROCEDURE — 81001 URINALYSIS AUTO W/SCOPE: CPT

## 2021-04-20 PROCEDURE — 6360000002 HC RX W HCPCS: Performed by: EMERGENCY MEDICINE

## 2021-04-20 PROCEDURE — 80053 COMPREHEN METABOLIC PANEL: CPT

## 2021-04-20 PROCEDURE — 74177 CT ABD & PELVIS W/CONTRAST: CPT

## 2021-04-20 PROCEDURE — 6360000004 HC RX CONTRAST MEDICATION: Performed by: EMERGENCY MEDICINE

## 2021-04-20 PROCEDURE — 96374 THER/PROPH/DIAG INJ IV PUSH: CPT

## 2021-04-20 RX ORDER — OXYCODONE AND ACETAMINOPHEN 7.5; 325 MG/1; MG/1
1 TABLET ORAL EVERY 6 HOURS PRN
Qty: 20 TABLET | Refills: 0 | Status: SHIPPED | OUTPATIENT
Start: 2021-04-20 | End: 2021-04-25

## 2021-04-20 RX ORDER — ONDANSETRON 2 MG/ML
4 INJECTION INTRAMUSCULAR; INTRAVENOUS ONCE
Status: COMPLETED | OUTPATIENT
Start: 2021-04-20 | End: 2021-04-20

## 2021-04-20 RX ORDER — PHENAZOPYRIDINE HYDROCHLORIDE 100 MG/1
100 TABLET, FILM COATED ORAL 3 TIMES DAILY PRN
Qty: 30 TABLET | Refills: 1 | Status: SHIPPED | OUTPATIENT
Start: 2021-04-20 | End: 2021-06-25 | Stop reason: ALTCHOICE

## 2021-04-20 RX ORDER — MORPHINE SULFATE 4 MG/ML
2 INJECTION, SOLUTION INTRAMUSCULAR; INTRAVENOUS ONCE
Status: DISCONTINUED | OUTPATIENT
Start: 2021-04-20 | End: 2021-04-20

## 2021-04-20 RX ORDER — MORPHINE SULFATE 4 MG/ML
4 INJECTION, SOLUTION INTRAMUSCULAR; INTRAVENOUS ONCE
Status: COMPLETED | OUTPATIENT
Start: 2021-04-20 | End: 2021-04-20

## 2021-04-20 RX ORDER — OXYBUTYNIN CHLORIDE 10 MG/1
10 TABLET, EXTENDED RELEASE ORAL DAILY
Qty: 10 TABLET | Refills: 1 | Status: SHIPPED | OUTPATIENT
Start: 2021-04-20 | End: 2021-10-11

## 2021-04-20 RX ORDER — PHENAZOPYRIDINE HYDROCHLORIDE 200 MG/1
200 TABLET, FILM COATED ORAL ONCE
Status: COMPLETED | OUTPATIENT
Start: 2021-04-20 | End: 2021-04-20

## 2021-04-20 RX ORDER — ATROPA BELLADONNA AND OPIUM 16.2; 3 MG/1; MG/1
30 SUPPOSITORY RECTAL EVERY 8 HOURS PRN
Status: DISCONTINUED | OUTPATIENT
Start: 2021-04-20 | End: 2021-04-20 | Stop reason: HOSPADM

## 2021-04-20 RX ORDER — KETOROLAC TROMETHAMINE 30 MG/ML
15 INJECTION, SOLUTION INTRAMUSCULAR; INTRAVENOUS ONCE
Status: COMPLETED | OUTPATIENT
Start: 2021-04-20 | End: 2021-04-20

## 2021-04-20 RX ADMIN — KETOROLAC TROMETHAMINE 15 MG: 30 INJECTION, SOLUTION INTRAMUSCULAR; INTRAVENOUS at 09:11

## 2021-04-20 RX ADMIN — PHENAZOPYRIDINE HYDROCHLORIDE 200 MG: 200 TABLET ORAL at 09:09

## 2021-04-20 RX ADMIN — MORPHINE SULFATE 4 MG: 4 INJECTION, SOLUTION INTRAMUSCULAR; INTRAVENOUS at 07:14

## 2021-04-20 RX ADMIN — ONDANSETRON HYDROCHLORIDE 4 MG: 2 SOLUTION INTRAMUSCULAR; INTRAVENOUS at 07:14

## 2021-04-20 RX ADMIN — ATROPA BELLADONNA AND OPIUM 30 MG: 16.2; 3 SUPPOSITORY RECTAL at 09:47

## 2021-04-20 RX ADMIN — IOPAMIDOL 90 ML: 755 INJECTION, SOLUTION INTRAVENOUS at 08:01

## 2021-04-20 ASSESSMENT — PAIN DESCRIPTION - LOCATION: LOCATION: PENIS

## 2021-04-20 ASSESSMENT — ENCOUNTER SYMPTOMS
VOMITING: 0
ABDOMINAL PAIN: 1
BACK PAIN: 0
DIARRHEA: 0
EYE PAIN: 0
SHORTNESS OF BREATH: 0

## 2021-04-20 ASSESSMENT — PAIN SCALES - GENERAL: PAINLEVEL_OUTOF10: 9

## 2021-04-20 NOTE — TELEPHONE ENCOUNTER
Received a call from patients wife. She called to report that Rosy Worrell had surgery last Thursday per Dr. Atif Freeman. She stated pathology showed bladder cancer. Dr. Nam De Los Santos reviewed pathology no new orders.

## 2021-04-20 NOTE — ED TRIAGE NOTES
Pt here with pain at penis. Pt had catheter placed by dr Kate Rausch after surgery to scrape a mass from his bladder and cauterized it.

## 2021-04-20 NOTE — CARE COORDINATION
Date / Time of Evaluation: 4/20/2021 10:54 AM  Assessment Completed by: Ivette Reis    Patient Admission Status: Seen in ER    [address] [facility] [address] [id number] (home)   Telephone Information:   Mobile [phone]       (Best Practice:  Have patient / caregiver verify above address and phone number by stating out loud their current address and reachable phone number.)  Is above information correct? Yes      Current PCP:  John Werner MD  PCP verified? Yes    Initial Assessment Completed at bedside with:  Pt/wife at bedsid    Emergency Contacts:  Extended Emergency Contact Information  Primary Emergency Contact: Mary Carmen Rajan Larrañaga [address] Phone: [phone]  Mobile Phone: [phone]  Relation: Spouse    Advance Directives: Code Status:  Prior    Financial:  Payor: Helga Etienne / Plan: Chela Youssef / Product Type: *No Product type* /     Pre-Cert required for SNF:  Yes    Have Long Term Care Insurance:  N/A    Pharmacy:   [address] [place] [address]  Phone: [phone] Fax: [phone]      Potential assistance purchasing medications?  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  Support System:  sposue    Current Home Environment:  Home   Steps:  managing    Plans to RETURN to current housing: yes  Barriers to RETURNING to current housing:  None stated    Currently ACTIVE with Home Health CARE:  No  [facility]:  none    DME Provider:  travis Coronelr at home     Has a pulse oximetry unit at home: no    Had [address] [facility] prior to admission:  No  [address]:  n/a  Informed of need to bring portable home O2 tank to hospital on day of DISCHARGE:  n/a  Name of person committed to bringing portable tank at discharge:  n/a    Active with HD/PD prior to admission: No    Transition Plan:  Home with spouse    Transportation PLAN for Discharge:  Wife/private car    Factors facilitating achievement of predicted outcomes: medically stable     Barriers to discharge:  Indwelling catheter, catheter problems    Additional CM Notes: CM met with pt/spouse at the bedside. Pt states, he was doing well until Sunday night and started to have pain, per Urology most likely bladder spasms. [facility]. Per spouse do not feel HC is necessary, and have an appointment with Dr. Tiara Mai on 4/22/2021. Son is here to assist w/pt transport back home. Monica Graf and/or his family were provided with choice of provider.         Brittney Jaramillo RN  [facility]  Care Management Department  Ph:  [phone]   Fax: [phone]  Electronically signed by Brittney Jaramillo RN on 4/20/2021 at 11:02 AM

## 2021-04-20 NOTE — ED PROVIDER NOTES
Musculoskeletal: Negative for back pain and neck pain. Skin: Negative for rash. Neurological: Negative for weakness and headaches. All other systems reviewed and are negative. A complete review of systems was performed and is negative except as noted above in the HPI.        PAST MEDICAL HISTORY     Past Medical History:   Diagnosis Date    Diabetes mellitus ([facility]     Elevated PSA     Hypertension     Kidney stone     Lymphoma ([facility]     Prostate cancer ([facility]          SURGICAL HISTORY       Past Surgical History:   Procedure Laterality Date    APPENDECTOMY      CYSTOSCOPY N/A 4/15/2021    CYSTOSCOPY, BILATERAL URETERAL CATHETERIZATIONS WITH RETROGRADES, URETHRAL DILATATION, CLOT EVACUATION AND FULGURATION OF BLEEDING, TRANSURETHRAL RESECTION OF BLADDER TUMOR performed by Ben Baxter MD at [facility]  2014    LAPAROTOMY      Resection of retroperitoneal lymphadenopathy    SHOULDER SURGERY  2016   [address] [place] [address]       Current Discharge Medication List      CONTINUE these medications which have NOT CHANGED    Details   cefdinir (OMNICEF) 300 MG capsule Take 1 capsule by mouth 2 times daily for 10 days  Qty: 20 capsule, Refills: 0    Associated Diagnoses: Dysuria      rivaroxaban (XARELTO) 20 MG TABS tablet Take 1 tablet by mouth daily  Qty: 30 tablet, Refills: 0    Comments: Hold postop for 5 days      furosemide (LASIX) 20 MG tablet Take 20 mg by mouth daily      magnesium 30 MG tablet Take 30 mg by mouth 2 times daily      benzonatate (TESSALON) 100 MG capsule Take 100 mg by mouth 3 times daily as needed for Cough      megestrol (MEGACE) 20 MG tablet Take 1 tablet by mouth daily  Qty: 30 tablet, Refills: 11    Associated Diagnoses: Prostate cancer (HCC)      gabapentin (NEURONTIN) 100 MG capsule gabapentin 100 mg capsule      TRUE METRIX BLOOD GLUCOSE TEST strip USE 1 STRIP TO CHECK GLUCOSE TWICE DAILY      Travoprost, BAK Free, (TRAVATAN Z) connections     Talks on phone: None     Gets together: None     Attends Zoroastrian service: None     Active member of club or organization: None     Attends meetings of clubs or organizations: None     Relationship status: None    Intimate partner violence     Fear of current or ex partner: None     Emotionally abused: None     Physically abused: None     Forced sexual activity: None   Other Topics Concern    None   Social History Narrative    None       SCREENINGS             PHYSICAL EXAM    (up to 7 for level 4, 8 or more for level 5)     ED Triage Vitals [04/20/21 0700]   BP Temp Temp src Pulse Resp SpO2 Height Weight   133/70 98 °F (36.7 °C) -- 74 18 94 % -- 239 lb (108.4 kg)       Physical Exam  Vitals signs reviewed. Exam conducted with a chaperone present. Constitutional:       General: He is not in acute distress. Appearance: He is well-developed. HENT:      Head: Normocephalic and atraumatic. Eyes:      General: No scleral icterus. Pupils: Pupils are equal, round, and reactive to light. Neck:      Musculoskeletal: Normal range of motion and neck supple. Vascular: No JVD. Cardiovascular:      Rate and Rhythm: Normal rate and regular rhythm. Pulses: Normal pulses. Heart sounds: Normal heart sounds. Pulmonary:      Effort: Pulmonary effort is normal. No respiratory distress. Breath sounds: Normal breath sounds. Abdominal:      General: There is no distension. Palpations: Abdomen is soft. There is no pulsatile mass. Tenderness: There is abdominal tenderness in the suprapubic area. There is no guarding or rebound. Genitourinary:     Penis: No erythema, tenderness, discharge or swelling. Testes: Normal.      Comments: Valle in place. Penis otherwise normal.  Musculoskeletal:         General: No tenderness. Right lower leg: No edema. Left lower leg: No edema. Skin:     General: Skin is warm and dry.       Capillary Refill: Capillary refill takes less than 2 seconds. Neurological:      Mental Status: He is alert and oriented to person, place, and time. Psychiatric:         Mood and Affect: Mood normal.         Behavior: Behavior normal.         DIAGNOSTIC RESULTS       RADIOLOGY:   Non-plain film images such as CT, Ultrasound and MRI are read by the radiologist. [employer] Debbie images are visualized and preliminarily interpreted by the emergency physician with the below findings:    Interpretation per the Radiologist below, if available at the time of this note:    CT ABDOMEN PELVIS W IV CONTRAST Additional Contrast? None   Final Result   1. Valle catheter within the bladder with diffuse bladder wall   thickening and mild perivesicular fat stranding. Correlation for   cystitis recommended. Radiation seeds prostate. No loculated pelvic   fluid collection. 2. Hepatic steatosis. 3. Chronic findings of the spleen described above. 4. Avascular necrosis of the femoral heads.    Signed by Dr Linda Wasserman on 4/20/2021 8:25 AM            LABS:  Labs Reviewed   CBC - Abnormal; Notable for the following components:       Result Value    WBC 3.4 (*)     RBC 4.16 (*)     Hemoglobin 13.7 (*)     Hematocrit 41.0 (*)     MCV 98.6 (*)     MCH 32.9 (*)     All other components within normal limits   COMPREHENSIVE METABOLIC PANEL W/ REFLEX TO MG FOR LOW K - Abnormal; Notable for the following components:    Glucose 158 (*)     All other components within normal limits   URINE RT REFLEX TO CULTURE - Abnormal; Notable for the following components:    Color, UA DARK YELLOW (*)     Clarity, UA CLOUDY (*)     Ketones, Urine TRACE (*)     Blood, Urine LARGE (*)     Leukocyte Esterase, Urine SMALL (*)     All other components within normal limits   MICROSCOPIC URINALYSIS - Abnormal; Notable for the following components:    Bacteria, UA NEGATIVE (*)     Crystals, UA NEG (*)     WBC, UA 8 (*)     RBC, UA >900 (*)     All other components within normal limits LIPASE       All other labs were within normal range or not returned as of this dictation. EMERGENCY DEPARTMENT COURSE and DIFFERENTIALDIAGNOSIS/MDM:   Vitals:    Vitals:    04/20/21 0700   BP: 133/70   Pulse: 74   Resp: 18   Temp: 98 °F (36.7 °C)   SpO2: 94%   Weight: 239 lb (108.4 kg)       MDM  No signs of any urine in the bladder on bladder scan. Patient's case discussed with Dr. Ирина Pedroza. He is agreeable with current plan of care. Patient's O2 sats dipped slightly after morphine. Placed on supplemental O2 by nasal cannula and sats back to normal.  Patient resting comfortably now. Patient's pain was better after morphine but pain returning. Patient still in significant discomfort. Uncertain as far as the etiology but seems like he is in quite a bit of discomfort. Call placed to Dr. Ирина Pedroza to discuss plan of care. Patient's case discussed with Dr. Ирина Pedroza. He feels like symptoms are probably all due to bladder spasms. He asked that I order B&O suppository x1 here, Toradol x1 and Pyridium x1 for patient while in ER and he feels like patient can be discharged home and he will write electronic prescriptions for higher dose of pain medication and something for bladder spasms and wants the patient to follow-up in 2 days as scheduled in urology office with plan to have the catheter removed at that time. Patient feeling better after Toradol Pyridium and suppository. Resting comfortably at this time. Stable for discharge. Told him about plan to follow-up with Dr. Ирина Pedroza in 2 days for catheter removal and to call Dr. Ирина Pedroza sooner if symptoms worsen or return to the ER if needed for any change worsening symptoms or new concerns. Also told patient to notify his oncologist, Dr. Jerry Mendoza and primary doctor about his visit today so they can review of his results including labs and results of CT scan from today. Patient family agreeable plan.     CONSULTS:  None    PROCEDURES:  Unless otherwise

## 2021-04-22 ENCOUNTER — NURSE ONLY (OUTPATIENT)
Dept: UROLOGY | Age: 76
End: 2021-04-22
Payer: MEDICARE

## 2021-04-22 VITALS — DIASTOLIC BLOOD PRESSURE: 68 MMHG | SYSTOLIC BLOOD PRESSURE: 135 MMHG | TEMPERATURE: 97.1 F

## 2021-04-22 DIAGNOSIS — N32.89 BLADDER SPASMS: Primary | ICD-10-CM

## 2021-04-22 LAB
BACTERIA URINE, POC: ABNORMAL
BILIRUBIN URINE: 3 MG/DL
BLOOD, URINE: POSITIVE
CASTS URINE, POC: ABNORMAL
CLARITY: ABNORMAL
COLOR: ABNORMAL
CRYSTALS URINE, POC: ABNORMAL
EPI CELLS URINE, POC: ABNORMAL
GLUCOSE URINE: ABNORMAL
KETONES, URINE: POSITIVE
LEUKOCYTE EST, POC: ABNORMAL
NITRITE, URINE: POSITIVE
PH UA: 5 (ref 4.5–8)
PROTEIN UA: POSITIVE
RBC URINE, POC: ABNORMAL
SPECIFIC GRAVITY UA: 1.02 (ref 1–1.03)
UROBILINOGEN, URINE: 4
WBC URINE, POC: 2
YEAST URINE, POC: ABNORMAL

## 2021-04-22 PROCEDURE — 51700 IRRIGATION OF BLADDER: CPT | Performed by: NURSE PRACTITIONER

## 2021-04-22 PROCEDURE — 81000 URINALYSIS NONAUTO W/SCOPE: CPT | Performed by: NURSE PRACTITIONER

## 2021-04-22 RX ORDER — ATROPA BELLADONNA AND OPIUM 16.2; 6 MG/1; MG/1
30 SUPPOSITORY RECTAL EVERY 12 HOURS PRN
Qty: 3 SUPPOSITORY | Refills: 0 | Status: SHIPPED | OUTPATIENT
Start: 2021-04-22 | End: 2021-06-25 | Stop reason: ALTCHOICE

## 2021-04-28 NOTE — PROGRESS NOTES
Patient of Carrington Guerra presents today for voiding trial post TURBT, clot evac on 4/15/21. The patient denies any fever, chills or  N&V. After patient had given consent, using the catheter in place, 60cc of sterile water was installed into the bladder, patient instantly started having bladder spasms and leaking around catheter. I informed Chelsey Contreras. She spoke with patient. Patient was able to void 20cc prior to leaving office. MADELEINE Fontaine was in office at time of procedure. Patient was advised to drink clear fluids for the next couple hours and urinate. Patient was advised they may experience some blood in the urine and burning with urination for the next couple days. If the patient is unable to urinate or develops fever, chills, N&V or suprapubic pain, they will call office for an appt at clinic or seek medical treatment at nearest ER, PCP or Urgent Care if after hours. Patient verbalized understanding and all questions were answered. Patient advised to call the office with any questions or concerns. Patient is to follow up as scheduled.

## 2021-04-30 LAB
MAXIMAL PREDICTED HEART RATE: 146 BPM
STRESS TARGET HR: 124 BPM

## 2021-06-17 DIAGNOSIS — C61 PROSTATE CANCER (HCC): ICD-10-CM

## 2021-06-17 LAB — PROSTATE SPECIFIC ANTIGEN: <0.01 NG/ML (ref 0–4)

## 2021-06-25 ENCOUNTER — OFFICE VISIT (OUTPATIENT)
Dept: UROLOGY | Age: 76
End: 2021-06-25
Payer: MEDICARE

## 2021-06-25 DIAGNOSIS — N99.111 POSTPROCEDURAL BULBOUS URETHRAL STRICTURE: ICD-10-CM

## 2021-06-25 DIAGNOSIS — C61 PROSTATE CANCER (HCC): Primary | ICD-10-CM

## 2021-06-25 DIAGNOSIS — C67.2 MALIGNANT NEOPLASM OF LATERAL WALL OF URINARY BLADDER (HCC): ICD-10-CM

## 2021-06-25 DIAGNOSIS — R39.14 FEELING OF INCOMPLETE BLADDER EMPTYING: ICD-10-CM

## 2021-06-25 PROCEDURE — 1123F ACP DISCUSS/DSCN MKR DOCD: CPT | Performed by: UROLOGY

## 2021-06-25 PROCEDURE — 81002 URINALYSIS NONAUTO W/O SCOPE: CPT | Performed by: UROLOGY

## 2021-06-25 PROCEDURE — 99214 OFFICE O/P EST MOD 30 MIN: CPT | Performed by: UROLOGY

## 2021-06-25 PROCEDURE — G8427 DOCREV CUR MEDS BY ELIG CLIN: HCPCS | Performed by: UROLOGY

## 2021-06-25 PROCEDURE — G8417 CALC BMI ABV UP PARAM F/U: HCPCS | Performed by: UROLOGY

## 2021-06-25 PROCEDURE — 1036F TOBACCO NON-USER: CPT | Performed by: UROLOGY

## 2021-06-25 PROCEDURE — 3017F COLORECTAL CA SCREEN DOC REV: CPT | Performed by: UROLOGY

## 2021-06-25 PROCEDURE — 4040F PNEUMOC VAC/ADMIN/RCVD: CPT | Performed by: UROLOGY

## 2021-06-25 PROCEDURE — 51798 US URINE CAPACITY MEASURE: CPT | Performed by: UROLOGY

## 2021-06-25 ASSESSMENT — ENCOUNTER SYMPTOMS
SORE THROAT: 0
VOMITING: 0
WHEEZING: 0
BACK PAIN: 0
NAUSEA: 0
COUGH: 0
EYE PAIN: 0

## 2021-06-25 NOTE — PROGRESS NOTES
[Metronidazole]      weakness       Social History     Socioeconomic History    Marital status:      Spouse name: None    Number of children: None    Years of education: None    Highest education level: None   Occupational History    None   Tobacco Use    Smoking status: Never Smoker    Smokeless tobacco: Never Used   Vaping Use    Vaping Use: Never used   Substance and Sexual Activity    Alcohol use: None    Drug use: None    Sexual activity: None   Other Topics Concern    None   Social History Narrative    None     Social Determinants of Health     Financial Resource Strain:     Difficulty of Paying Living Expenses:    Food Insecurity:     Worried About Running Out of Food in the Last Year:     Ran Out of Food in the Last Year:    Transportation Needs:     Lack of Transportation (Medical):  Lack of Transportation (Non-Medical):    Physical Activity:     Days of Exercise per Week:     Minutes of Exercise per Session:    Stress:     Feeling of Stress :    Social Connections:     Frequency of Communication with Friends and Family:     Frequency of Social Gatherings with Friends and Family:     Attends Spiritism Services:     Active Member of Clubs or Organizations:     Attends Club or Organization Meetings:     Marital Status:    Intimate Partner Violence:     Fear of Current or Ex-Partner:     Emotionally Abused:     Physically Abused:     Sexually Abused:        Family History   Problem Relation Age of Onset    Heart Attack Father 72        cause of death    Kidney Disease Mother     Other Mother         renal failure    Cancer Sister [de-identified]        Colon CA- cause of death     Heart Attack Sister     Colon Cancer Sister 68       REVIEW OF SYSTEMS:  Review of Systems   Constitutional: Negative for chills and fever. HENT: Negative for congestion and sore throat. Eyes: Negative for pain and visual disturbance. Respiratory: Negative for cough and wheezing. Cardiovascular: Negative for chest pain and palpitations. Gastrointestinal: Negative for nausea and vomiting. Endocrine: Negative for polyphagia and polyuria. Genitourinary: Negative for decreased urine volume, difficulty urinating, discharge, dysuria, enuresis, flank pain, frequency, genital sores, hematuria, penile pain, penile swelling, scrotal swelling, testicular pain and urgency. Musculoskeletal: Negative for back pain and neck pain. Skin: Negative for rash and wound. Allergic/Immunologic: Negative for environmental allergies and food allergies. Neurological: Negative for dizziness and headaches. Hematological: Negative for adenopathy. Does not bruise/bleed easily. Psychiatric/Behavioral: Negative for confusion and hallucinations. All other systems reviewed and are negative. PHYSICAL EXAM:  There were no vitals taken for this visit. Physical Exam  Constitutional:       General: He is not in acute distress. Appearance: Normal appearance. He is well-developed. HENT:      Head: Normocephalic and atraumatic. Nose: Nose normal.   Eyes:      General: No scleral icterus. Conjunctiva/sclera: Conjunctivae normal.      Pupils: Pupils are equal, round, and reactive to light. Neck:      Trachea: No tracheal deviation. Cardiovascular:      Rate and Rhythm: Normal rate and regular rhythm. Pulmonary:      Effort: Pulmonary effort is normal. No respiratory distress. Breath sounds: No stridor. Abdominal:      General: There is no distension. Palpations: Abdomen is soft. There is no mass. Tenderness: There is no abdominal tenderness. Musculoskeletal:         General: No tenderness. Normal range of motion. Cervical back: Normal range of motion and neck supple. Lymphadenopathy:      Cervical: No cervical adenopathy. Skin:     General: Skin is warm and dry. Findings: No erythema.    Neurological:      Mental Status: He is alert and oriented to CHERRY CLARK             Completed:     04/20/2021   Perform Phys: Baylee Bartholomew     FINAL DIAGNOSIS:     Urinary bladder, transurethral resection of left lateral wall bladder   tumor:   1.  Noninvasive low-grade papillary urothelial carcinoma. 2.  Muscularis propria is identified, negative for evidence of   malignancy. AJCC STAGE: pTa, pNx       URINARY BLADDER:  Biopsy and Transurethral Resection of Bladder Tumor   (TURBT)     SPECIMEN     Procedure:  TURBT   TUMOR     Tumor Type     Non-invasive urothelial carcinoma:     Non-invasive Histologic Type:  Non-invasive urothelial (transitional   cell) carcinoma   Histologic Grade:  Urothelial carcinoma:   Low-grade   EXTENT     Microscopic Tumor Extension:     Noninvasive papillary carcinoma   ACCESSORY FINDINGS     Tumor Configuration:   Papillary   Adequacy of Material for Determining Muscularis Propria Invasion:   Muscularis propria (detrusor muscle) present   Lymph-Vascular Invasion:  Not identified   ADDITIONAL NON-TUMOR FINDINGS            CBG/CBG         1. Prostate cancer ([facility]  PSA remains undetectable. He will need a repeat PSA in 6 months  - OR Measure, post-void residual, US, non-imaging  - POCT Urinalysis no Micro    2. Malignant neoplasm of lateral wall of urinary bladder ([facility]  TURBT pathology report reviewed as above shows noninvasive bladder cancer he will need surveillance cystoscopy. He will return in about 3 weeks for cystoscopy. He may require dilation of bulbar urethral stricture at that time. - Cystoscopy; Future  - Cytology, Non-Gyn; Future    3. Postprocedural bulbous urethral stricture  Status post dilation he is voiding better. He may require office dilation when he returns for his cystoscopy for bladder cancer  - OR Measure, post-void residual, US, non-imaging  - POCT Urinalysis no Micro    4.  Feeling of incomplete bladder emptying   His residual was acceptable today  - OR Measure, post-void residual, US, non-imaging  - POCT Urinalysis no Micro      Orders Placed This Encounter   Procedures    Cytology, Non-Gyn     Prior to next visit in 3 weeks     Standing Status:   Future     Standing Expiration Date:   6/25/2022     Order Specific Question:   PREVIOUS BIOPSY     Answer:   Yes     Order Specific Question:   PREOP DIAGNOSIS     Answer:   History of bladder cancer     Order Specific Question:   FROZEN SECTION - NO OR YES/SPECIMEN     Answer:   No    POCT Urinalysis no Micro    SD Measure, post-void residual, US, non-imaging     0ML    Cystoscopy     Next available     Standing Status:   Future     Standing Expiration Date:   6/25/2022     Scheduling Instructions:      Next available in approximate 3 weeks        Return in about 3 weeks (around 7/16/2021) for Cystoscopy on next visit, Urine Cytology prior to next visit. All information inputted into the note by the MA to include chief complaint, past medical history, past surgical history, medications, allergies, social and family history and review of systems has been reviewed and updated as needed by me. EMR Dragon/transcription disclaimer: Much of this documentt is electronic  transcription/translation of spoken language to printed text. The  electronic translation of spoken language may be erroneous, or at times,  nonsensical words or phrases may be inadvertently transcribed.  Although I  have reviewed the document for such errors, some may still exist.

## 2021-07-15 ENCOUNTER — PROCEDURE VISIT (OUTPATIENT)
Dept: UROLOGY | Age: 76
End: 2021-07-15
Payer: MEDICARE

## 2021-07-15 VITALS — BODY MASS INDEX: 31.14 KG/M2 | WEIGHT: 235 LBS | HEIGHT: 73 IN | TEMPERATURE: 98.2 F

## 2021-07-15 DIAGNOSIS — C61 PROSTATE CANCER (HCC): ICD-10-CM

## 2021-07-15 DIAGNOSIS — Z85.51 HISTORY OF BLADDER CANCER: Primary | ICD-10-CM

## 2021-07-15 LAB
BACTERIA URINE, POC: 0
BILIRUBIN URINE: 0 MG/DL
BLOOD, URINE: NEGATIVE
CASTS URINE, POC: 0
CLARITY: CLEAR
COLOR: YELLOW
CRYSTALS URINE, POC: 0
EPI CELLS URINE, POC: NORMAL
GLUCOSE URINE: NORMAL
KETONES, URINE: NEGATIVE
LEUKOCYTE EST, POC: NORMAL
NITRITE, URINE: NEGATIVE
PH UA: 5.5 (ref 4.5–8)
PROTEIN UA: NEGATIVE
RBC URINE, POC: 0
SPECIFIC GRAVITY UA: 1.03 (ref 1–1.03)
UROBILINOGEN, URINE: NORMAL
WBC URINE, POC: 5
YEAST URINE, POC: 0

## 2021-07-15 PROCEDURE — 52000 CYSTOURETHROSCOPY: CPT | Performed by: UROLOGY

## 2021-07-15 PROCEDURE — 81001 URINALYSIS AUTO W/SCOPE: CPT | Performed by: UROLOGY

## 2021-07-15 RX ORDER — TAMSULOSIN HYDROCHLORIDE 0.4 MG/1
0.4 CAPSULE ORAL DAILY
COMMUNITY
End: 2021-10-11

## 2021-07-15 NOTE — PROGRESS NOTES
BLADDER CANCER  Patient was first diagnosed with bladder cancer approximately 3 month(s) ago. Initial diagnosis by TURBT was done 4/16/2021. He also required dilation of urethral stricture at that time. He now is here for his first surveillance cystoscopy. Possible urethral dilation  Last Recurrence: None  Stage of bladder cancer at last recurrence: 8130/2 - Papillary transitional cell carcinoma, non-invasive, stage TA  Grade: low grade  Last urinary cytology/FISH results: negative; Date: 6/30/2021  Hematuria? microscopic  Last upper tract study: 3 month(s) ago. Study was a bilateral retrograde done 4/16/2021         Prostate Cancer  Patient is here today for prostate cancer which was first diagnosed 16 months ago. His prostate cancer can be characterized as clinical stage T2b [place] 3+4 = 7. PSA was 5.5 diagnosis he has undetectable PSA and has remained stable  His last several PSA values are as follows:        Lab Results   Component Value Date     PSA <0.01 06/17/2021     PSA <0.01 03/09/2021     PSA <0.01 12/09/2020      Previous treatment of prostate cancer: X radiation therapy completed 9/10/2020 with 6 months of neoadjuvant hormonal therapy. Lower urinary tract symptoms: frequency and nocturia, 2 times per night     He is due his next PSA December 2021      Cystoscopy Operative Note (7/15/21)  Surgeon: Sarah Upton MD  Anesthesia: Urethral 2% Xylocaine   Indications: History of bladder cancer  Position: Supine    Findings:   The patient was prepped and draped in the usual sterile fashion. The flexible cystoscope was advanced through the urethra and into the bladder under direct vision. The bladder was thoroughly inspected and the following was noted:    Residual Urine: moderate  Urethra: normal appearing urethra with no masses, tenderness or lesions and stenotic at the bulbar urethra where he had it dilated but this was widely opened to allow the scope past no stricture just narrowed.   Prostate: partially obstructing lateral lobes of prostate; median lobe present? no.    Bladder: No tumors or CIS noted. No bladder diverticulum. There was mild trabeculation noted. Just lateral to the left ureteral orifice at the prior TUR site there was some necrotic debris some dystrophic calcification where the TURBT had not completely healed yet but otherwise no recurrent or residual papillary cancer seen  Ureters: Clear efflux from both ureters. Orifices with normal configuration and location. The cystoscope was removed. The patient tolerated the procedure well. The patient was given a post procedure instructions and follow up. Results for orders placed or performed in visit on 07/15/21   POCT Urinalysis Dipstick w/ Micro (Auto)   Result Value Ref Range    Color, UA Yellow     Clarity, UA Clear Clear    Glucose, Ur NEG     Bilirubin Urine 0 mg/dL    Ketones, Urine Negative     Specific Gravity, UA 1.030 1.005 - 1.030    Blood, Urine Negative     pH, UA 5.5 4.5 - 8.0    Protein, UA Negative Negative    Nitrite, Urine Negative     Leukocytes, UA SMALL     Urobilinogen, Urine Normal     rbc urine, poc 0     wbc urine, poc 5     bacteria urine, poc 0     yeast urine, poc 0     casts urine, poc 0     epi cells urine, poc 1+     crystals urine, poc 0            1. History of bladder cancer  Surveillance cystoscopy today showed that the prior resection site is not completely healed but no evidence of recurrence continue 3-month surveillance  - Cystoscopy  - Cytology, Non-Gyn; Future  - Cystoscopy; Future  - POCT Urinalysis Dipstick w/ Micro (Auto)    2.  Prostate cancer [facility])  Patient is due his next PSA December 2021      Orders Placed This Encounter   Procedures    Cytology, Non-Gyn     Prior to next visit in 3 mos     Standing Status:   Future     Standing Expiration Date:   7/15/2022     Order Specific Question:   PREVIOUS BIOPSY     Answer:   Yes     Order Specific Question:   PREOP DIAGNOSIS     Answer: History of bladder cancer     Order Specific Question:   FROZEN SECTION - NO OR YES/SPECIMEN     Answer:   No    POCT Urinalysis Dipstick w/ Micro (Auto)    Cystoscopy     Cystoscopy in 3 months     Standing Status:   Future     Standing Expiration Date:   7/15/2022       Return in about 3 months (around 10/15/2021) for Cystoscopy on next visit.         Tri Talavera MD

## 2021-07-20 ENCOUNTER — TELEPHONE (OUTPATIENT)
Dept: UROLOGY | Age: 76
End: 2021-07-20

## 2021-07-20 NOTE — TELEPHONE ENCOUNTER
He could utilize some AZO to see if this may control his frequency in the short term. Ditropan may take longer to work and if he has been stable without it, he probably doesn't need it, but I do not see any issues with restarting if he wants. If he continues to have frequency, may need to be checked for infection.

## 2021-07-22 NOTE — TELEPHONE ENCOUNTER
Notified patient to try AZO and if not any better by mid next week to schedule appt for urine check.

## 2021-07-29 NOTE — PROGRESS NOTES
MEDICAL ONCOLOGY PROGRESS NOTE                                                          Griffin Fontaine   0/4/2039 8/2/2021     Chief Complaint   Patient presents with    Follow-up     Prostate cancer ([address]        INTERVAL HISTORY/HISTORY OF PRESENT ILLNESS:  Griffin Fontaine presents today for a surveillance follow up visit. He has a diagnosis of prostate cancer, follicular lymphoma. He has been diagnosed with stage 0 bladder cancer in April 2021 and is status post transurethral resection of bladder tumor by urology [facility]. F/u cystoscopy unremarkable. He is s/p RT for his prostate cancer and follicular lymphoma. He denies any new complaints. Denies any hematuria. Denies any B symptoms. Denies any peripheral neuropathy. 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 Stage I A  · Noninvasive low-grade papillary urothelial carcinoma, April 2021     Treatment summary   · 03/24/2020- Retroperitoneal ressection of lymphoma at [facility]  · 7/6/20-7/31/20-IFRT- 27 Gy in 18 fractions to left retroperitoneal lymph node-[facility]/Dr. Mike Hanna  · 6/24/20-8/21/20- 78 Gy in 42 fractions for prostate cancer -[facility]/Dr. Mike Hanna  · 4/15/21 Bladder cancer-TURBT by Dr. Phil Bernal  Cancer History:  Mr. Kip Jenkins was first seen by me on 2/21/2020 referred by Dr. Dora Asif for further evaluation and recommendations regarding findings of retroperitoneal mass/adenopathy. The patient has recently been diagnosed with prostate cancer with 4 out of 12 positive cores from the left prostatic lobe. PSA at diagnosis 5.5. Pathology showed prostatic adenocarcinoma grade group 2 in the Duke score 3+4 = 7. Clinical T2b.  Imaging studies with CT chest abdomen pelvis showed evidence of retroperitoneal mass. · 1/29/2020- CT of the abdomen with contrast showed scattered borderline sized mesenteric lymph nodes. Indeterminate 3 cm retroperitoneal soft tissue nodule within the upper left abdomen adjacent to the left renal vein. The mass measures 34 x 30 x 22 mm. · 1/29/2020- bone scan showed focal increased activity in the mid thoracic spine in the right seventh posterior medial rib which may represent chronic degenerative changes/large osteophytes. No other areas suggesting bony metastatic disease. · 2/13/2020- MRI thoracic spine showed no evidence of metastatic disease identified in the thoracic spine. In particular, there are no enhancing and/or infiltrative lesions identified in the thoracic spine or posterior ribs to correspond with the radiotracer activities from recent bone scan. It is possible that the bone scan findings are secondary to vertebral\" the vertebral degenerative change. · 2/13/2020- CT chest showed no evidence of metastatic disease in the chest.  Nodule in the retroperitoneum of the upper abdomen presumably a lymph node. Additional borderline lymph nodes seen within the central mesenteric root. · 2/21/2020- we discussed the findings of the images above. Recommend a PET scan to rule out metastatic disease. · 03/04/200- Pet Ct- Isolated neoplastic focus within the upper abdomen left-sided retroperitoneum SUV 11. Neoplastic lymphadenopathy is favored. This is not in a location which can be sampled by percutaneous biopsy. Otherwise no bone lesion or chest abnormality is seen. 3. Other mildly prominent mesenteric lymphadenopathy shows low level FDG uptake and reactive nodes are favored over neoplastic mesenteric nodes. · 3/24/2020- retroperitoneal mass resection3 cm mass off of the overlying the left renal vein at [facility] by Dr. Jacque Newell, surgical oncology. Path compatible with follicular lymphoma, grade 1-2 out of 3.  CD20-positive B cells that are positive for PAX-5, CD10, BCL6, and BCL-2 and negative for Cyclin D1. A CD21 stain highlights follicular dendritic meshworks associated with the B cell nodules. The Ki-67 proliferative index is approximately 10% within the nodules. CD3 highlights background T cells in interfollicular areas. · 4/13/2020- recommend bone marrow to complete staging for his lymphoma. Recommended to follow-up with urology for discussion of treatment for his prostate cancer. If bone marrow shows no evidence of lymphoma involvement then will recommend involved field radiation therapy to the retroperitoneal lymph nodes. · 4/13/2020-bone marrow biopsy/aspirate was unremarkable. No evidence of lymphomatous involvement. · 4/24/2020- I discussed with the patient the findings of bone marrow PET scan. Therefore, stage I A. Recommended involved field RT to the retroperitoneal nodes. · 7/6/20-7/31/20 Radiation therapy-1.5cGy to left retroperitoneal lymph node-[facility]/Dr. Catie Ozuna  · 6/24/20-8/21/20- 78 cGy in 42 fractions for prostate cancer -[facility]/Dr. Catie Ozuna  · 12/09/2020- PSA <0.01  · 01/11/2021- Ct Chest W Contrast Stable chest CT. 2. No new or acute abnormality. · 01/11/2021-Ct Abdomen Pelvis W Contrast The resolution of the previously seen enlarged left retroperitoneal lymph node. The persistent mild to moderate mesenteric lymphadenopathy. The persistent mesenteric thickening in the upper to mid abdomen. · 4/15/21 Bladder cancer-TURBT by Dr. Micah Vera: Urinary bladder, transurethral resection of left lateral wall bladder tumor: Noninvasive low-grade papillary urothelial carcinoma. Muscularis propria is identified, negative for evidence of malignancy. AJCC STAGE: pTa, pNx   · 4/20/21 CT abdomen/pelvis: Valle catheter within the bladder with diffuse bladder wall thickening and mild perivesicular fat stranding. Correlation for cystitis recommended. Radiation seeds prostate. No loculated pelvic fluid collection. Hepatic steatosis. Chronic findings of the spleen described above. Avascular necrosis of the femoral heads. · 6/17/21 PSA <0.01  · 6/30/21 Urine, ThinPrep preparation (1): Benign squamous epithelial cells and benign urothelial cells. Rare atypical urothelial cells identified, nondiagnostic of malignancy. Scattered neutrophils. PAST MEDICAL HISTORY:   Past Medical History:   Diagnosis Date    Diabetes mellitus ([facility] [address]     Elevated PSA     Hypertension     Kidney stone     Lymphoma ([facility] [address]     Prostate cancer ([facility] [address]           PAST SURGICAL HISTORY:  Past Surgical History:   Procedure Laterality Date    APPENDECTOMY      CYSTOSCOPY N/A 4/15/2021    CYSTOSCOPY, BILATERAL URETERAL CATHETERIZATIONS WITH RETROGRADES, URETHRAL DILATATION, CLOT EVACUATION AND FULGURATION OF BLEEDING, TRANSURETHRAL RESECTION OF BLADDER TUMOR performed by Sonu Wheeler MD at [address] [place] [address]  2014    LAPAROTOMY      Resection of retroperitoneal lymphadenopathy    SHOULDER SURGERY  2016    VASECTOMY  1979        SOCIAL HISTORY:  Social History     Socioeconomic History    Marital status:      Spouse name: None    Number of children: None    Years of education: None    Highest education level: None   Occupational History    None   Tobacco Use    Smoking status: Never Smoker    Smokeless tobacco: Never Used   Vaping Use    Vaping Use: Never used   Substance and Sexual Activity    Alcohol use: None    Drug use: None    Sexual activity: None   Other Topics Concern    None   Social History Narrative    None     Social Determinants of Health     Financial Resource Strain:     Difficulty of Paying Living Expenses:    Food Insecurity:     Worried About Running Out of Food in the Last Year:     Ran Out of Food in the Last Year:    Transportation Needs:     Lack of Transportation (Medical):      Lack of Transportation (Non-Medical):    Physical Activity:     Days of Exercise per Week:     Minutes of Exercise per Session:    Stress:     Feeling of Stress :    Social Connections:     Frequency of Communication with Friends and Family:     Frequency of Social Gatherings with Friends and Family:     Attends Orthodox Services:     Active Member of Clubs or Organizations:     Attends Club or Organization Meetings:     Marital Status:    Intimate Partner Violence:     Fear of Current or Ex-Partner:     Emotionally Abused:     Physically Abused:     Sexually Abused:        FAMILY HISTORY:  Family History   Problem Relation Age of Onset    Heart Attack Father 72        cause of death    Kidney Disease Mother     Other Mother         renal failure    Cancer Sister [de-identified]        Colon CA- cause of death     Heart Attack Sister     Colon Cancer Sister 68        Current Outpatient Medications   Medication Sig Dispense Refill    tamsulosin (FLOMAX) 0.4 MG capsule Take 0.4 mg by mouth daily      rivaroxaban (XARELTO) 20 MG TABS tablet Take 1 tablet by mouth daily 30 tablet 0    furosemide (LASIX) 20 MG tablet Take 20 mg by mouth daily      magnesium 30 MG tablet Take 30 mg by mouth 2 times daily      benzonatate (TESSALON) 100 MG capsule Take 100 mg by mouth 3 times daily as needed for Cough       megestrol (MEGACE) 20 MG tablet Take 1 tablet by mouth daily 30 tablet 11    TRUE METRIX BLOOD GLUCOSE TEST strip USE 1 STRIP TO CHECK GLUCOSE TWICE DAILY      Travoprost, BAK Free, (TRAVATAN Z) 0.004 % SOLN ophthalmic solution Travatan Z 0.004 % eye drops   INSTILL 1 DROP INTO EACH EYE AT BEDTIME      zoster recombinant adjuvanted vaccine (SHINGRIX) 50 MCG/0.5ML SUSR injection Shingrix (PF) 50 mcg/0.5 mL intramuscular suspension, kit      pantoprazole (PROTONIX) 40 MG tablet TAKE 1 TABLET BY MOUTH ONCE DAILY      verapamil (VERELAN) 240 MG extended release capsule daily       glipiZIDE (GLUCOTROL) 5 MG tablet Take 5 mg by mouth daily       meclizine (ANTIVERT) 25 MG tablet Take 25 mg by mouth 3 times daily as needed       SITagliptin (JANUVIA) 100 MG tablet Take 100 mg by mouth daily       sertraline (ZOLOFT) 100 MG tablet daily       lisinopril (PRINIVIL;ZESTRIL) 5 MG tablet daily       cetirizine (ZYRTEC ALLERGY) 10 MG tablet Zyrtec 10 mg tablet   Take 1 tablet every day by oral route.  rizatriptan (MAXALT) 10 MG tablet as needed       oxybutynin (DITROPAN XL) 10 MG extended release tablet Take 1 tablet by mouth daily for 10 days 10 tablet 1     No current facility-administered medications for this visit. REVIEW OF SYSTEMS:    Constitutional: no fever, no night sweats, no fatigue;   HEENT: no blurring of vision, no double vision, no hearing difficulty, no tinnitus,no ulceration, no dysphagia  Lungs: no cough, no shortness of breath, no wheeze;   CVS: no palpitation, no chest pain, no shortness of breath;  GI: no abdominal pain, no nausea , no vomiting, no constipation;   NIKHIL: no dysuria, frequency and urgency, no hematuria, no kidney stones;   Musculoskeletal: no joint pain, swelling , stiffness;   Endocrine: no polyuria, polydypsia, no cold or heat intolerence; Hematology/lymphatic: no easy brusing or bleeding, no hx of clotting disorder; no peripheral adenopathy. Dermatology: no skin rash, no eczema, no pruritis;   Psychiatry: no depression, no anxiety,no panic attacks, no suicide ideation; Neurology: no syncope, no seizures, no numbness or tingling of hands, no numbness or tingling of feet, no paresis;     PHYSICAL EXAM:    Vitals signs:  BP (!) 120/58   Pulse 62   Ht 6' 1\" (1.854 m)   Wt 236 lb 11.2 oz (107.4 kg)   SpO2 95%   BMI 31.23 kg/m²    Pain scale:  Pain Score:   0 - No pain     CONSTITUTIONAL: Alert, appropriate, no acute distress,   EYES: Non icteric, EOM intact, pupils equal round and reactive to light and accommodation. ENT: Oral mucus membranes moist, no oral pharyngeal lesions.  External inspection of ears and nose are normal. NECK: Supple, no masses. No palpable thyroid mass    CHEST/LUNGS: CTA bilaterally, normal respiratory effort   CARDIOVASCULAR: RRR, no murmurs. No lower extremity edema   ABDOMEN: soft non-tender, active bowel sounds, no hepatosplenomegaly. No palpable masses. EXTREMITIES: warm, Full ROM of all fours extremities. No focal weakness. SKIN: warm, dry with no rashes or lesions  LYMPH: No cervical, clavicular, axillary, or inguinal lymphadenopathy  NEUROLOGIC: follows commands, non focal.   PSYCH: mood and affect appropriate. Alert and oriented to time and place and person. Relevant Lab findings/reviewed by me:  4/15/21 Bladder cancer-TURBT by Dr. Missy Poole: Urinary bladder, transurethral resection of left lateral wall bladder tumor: Noninvasive low-grade papillary urothelial carcinoma. Muscularis propria is identified, negative for evidence of malignancy. AJCC STAGE: pTa, pNx     6/17/21 PSA <0.01    6/30/21 Urine, ThinPrep preparation (1): Benign squamous epithelial cells and benign urothelial cells. Rare atypical urothelial cells identified, nondiagnostic of malignancy. Scattered neutrophils. Relevant Imaging studies/reviewed by me:  4/20/21 CT abdomen/pelvis: Valle catheter within the bladder with diffuse bladder wall thickening and mild perivesicular fat stranding. Correlation for cystitis recommended. Radiation seeds prostate. No loculated pelvic fluid collection. Hepatic steatosis. Chronic findings of the spleen described above. Avascular necrosis of the femoral heads.       ASSESSMENT    Orders Placed This Encounter   Procedures    CT ABDOMEN PELVIS W IV CONTRAST Additional Contrast? Oral     Standing Status:   Future     Standing Expiration Date:   8/2/2022     Scheduling Instructions:      sched Oct 2021     Order Specific Question:   Additional Contrast?     Answer:   Oral     Order Specific Question:   Reason for exam:     Answer:   6 mo f/u    CT CHEST W CONTRAST     Standing Status:   Future Standing Expiration Date:   8/2/2022     Scheduling Instructions:      sched in Oct 2021     Order Specific Question:   Reason for exam:     Answer:   6 mo f/u      Cris Shah was seen today for follow-up. Diagnoses and all orders for this visit:    Prostate cancer [facility])  -     CT ABDOMEN PELVIS W IV CONTRAST Additional Contrast? Oral; Future  -     CT CHEST W CONTRAST; Future    Follicular lymphoma grade I of intra-abdominal lymph nodes (HCC)  -     CT ABDOMEN PELVIS W IV CONTRAST Additional Contrast? Oral; Future  -     CT CHEST W CONTRAST; Future    Malignant neoplasm of lateral wall of urinary bladder (HCC)  -     CT ABDOMEN PELVIS W IV CONTRAST Additional Contrast? Oral; Future  -     CT CHEST W CONTRAST; Future         Prostate cancer [id number] status post RT  6/24/20-8/21/20- 78 cGy in 42 fractions [facility]Dr[facility] Arpan Sal[facility]  Last PSA <0.01 Jun 2021  CT chest abdomen pelvis January 2021-stable retroperitoneal adenopathy.     Follicular lymphoma grade 1-2, Ki-67 10%, Stage I  Essentially, retroperitoneal adenopathy consistent with follicular lymphoma grade 1-2 with low Ki-67. The patient had a significant debulking surgery at [facility]. Bone marrow biopsy showed no evidence of lymphomatous involvement. PET scan showed no other evidence of metastatic disease. Therefore stage I disease. He received involved field RT as per NCCN guidelines completed July 2020. Recommended surveillance NCCN recommendation. CT chest abdomen pelvis January 2021-stable retroperitoneal adenopathy. CT scans chest abdomen pelvis every 6 months x2 years through July 2022     Hot flashes- prescribed oxybutynin ER 10 mg daily.   Now off oxybutinin    Stage 0 bladder cancer -continue cystoscopy surveillance with urology      PLAN:  · Follow-up with Dr. Walter Atkinson for prostate cancer and bladder cancer surveillance  · Follow-up with me by virtual visit after CT scans  · CT scans in Oct 2021 then every 6 months for 2 years  · PSA to be done by Dr. John Rivero and Dr. Rafael Osman recommendations        Follow Up:     Return for Appointment with Dr. Adelaida Travis by VV after CT scans. CT c/a/p in Oct     I, Beth Weber am scribing for Yanet Blanton MD. Electronically signed by Marcelo Horta on 8/2/2021 at 3:15 PM CDT. I, Dr Alisson Berry, personally performed the services described in this documentation as scribed by Marcelo Horta RN in my presence and is both accurate and complete. I have seen, examined and reviewed this patient medication list, appropriate labs and imaging studies. I reviewed relevant medical records and others physicians notes. I discussed the plans of care with the patient. I answered all the questions to the patients satisfaction. I have also reviewed the chief complaint (CC) and part of the history (History of Present Illness (HPI), Past Family Social History [facility]), or Review of Systems (ROS) and made changes when appropriated.        (Please note that portions of this note were completed with a voice recognition program. Efforts were made to edit the dictations but occasionally words are mis-transcribed.)

## 2021-07-30 DIAGNOSIS — C82.03 FOLLICULAR LYMPHOMA GRADE I OF INTRA-ABDOMINAL LYMPH NODES (HCC): ICD-10-CM

## 2021-07-30 DIAGNOSIS — C61 PROSTATE CANCER (HCC): Primary | ICD-10-CM

## 2021-08-02 ENCOUNTER — OFFICE VISIT (OUTPATIENT)
Dept: HEMATOLOGY | Age: 76
End: 2021-08-02
Payer: MEDICARE

## 2021-08-02 ENCOUNTER — HOSPITAL ENCOUNTER (OUTPATIENT)
Dept: INFUSION THERAPY | Age: 76
Discharge: HOME | End: 2021-08-02
Payer: MEDICARE

## 2021-08-02 VITALS
SYSTOLIC BLOOD PRESSURE: 120 MMHG | BODY MASS INDEX: 31.37 KG/M2 | WEIGHT: 236.7 LBS | DIASTOLIC BLOOD PRESSURE: 58 MMHG | HEIGHT: 73 IN | OXYGEN SATURATION: 95 % | HEART RATE: 62 BPM

## 2021-08-02 DIAGNOSIS — C82.03 FOLLICULAR LYMPHOMA GRADE I OF INTRA-ABDOMINAL LYMPH NODES (HCC): ICD-10-CM

## 2021-08-02 DIAGNOSIS — C61 PROSTATE CANCER (HCC): ICD-10-CM

## 2021-08-02 DIAGNOSIS — C67.2 MALIGNANT NEOPLASM OF LATERAL WALL OF URINARY BLADDER (HCC): ICD-10-CM

## 2021-08-02 DIAGNOSIS — C61 PROSTATE CANCER (HCC): Primary | ICD-10-CM

## 2021-08-02 LAB
BASOPHILS ABSOLUTE: 0.03 K/UL (ref 0.01–0.08)
BASOPHILS RELATIVE PERCENT: 0.6 % (ref 0.1–1.2)
EOSINOPHILS ABSOLUTE: 0.13 K/UL (ref 0.04–0.54)
EOSINOPHILS RELATIVE PERCENT: 2.5 % (ref 0.7–7)
HCT VFR BLD CALC: 45.6 % (ref 40.1–51)
HEMOGLOBIN: 14.4 G/DL (ref 13.7–17.5)
LYMPHOCYTES ABSOLUTE: 1.35 K/UL (ref 1.18–3.74)
LYMPHOCYTES RELATIVE PERCENT: 25.8 % (ref 19.3–53.1)
MCH RBC QN AUTO: 32.5 PG (ref 25.7–32.2)
MCHC RBC AUTO-ENTMCNC: 31.6 G/DL (ref 32.3–36.5)
MCV RBC AUTO: 102.9 FL (ref 79–92.2)
MONOCYTES ABSOLUTE: 0.6 K/UL (ref 0.24–0.82)
MONOCYTES RELATIVE PERCENT: 11.5 % (ref 4.7–12.5)
NEUTROPHILS ABSOLUTE: 3.12 K/UL (ref 1.56–6.13)
NEUTROPHILS RELATIVE PERCENT: 59.6 % (ref 34–71.1)
PDW BLD-RTO: 14.1 % (ref 11.6–14.4)
PLATELET # BLD: 141 K/UL (ref 163–337)
PMV BLD AUTO: 9.9 FL (ref 7.4–10.4)
RBC # BLD: 4.43 M/UL (ref 4.63–6.08)
WBC # BLD: 5.23 K/UL (ref 4.23–9.07)

## 2021-08-02 PROCEDURE — 1123F ACP DISCUSS/DSCN MKR DOCD: CPT | Performed by: INTERNAL MEDICINE

## 2021-08-02 PROCEDURE — G8427 DOCREV CUR MEDS BY ELIG CLIN: HCPCS | Performed by: INTERNAL MEDICINE

## 2021-08-02 PROCEDURE — 4040F PNEUMOC VAC/ADMIN/RCVD: CPT | Performed by: INTERNAL MEDICINE

## 2021-08-02 PROCEDURE — 1036F TOBACCO NON-USER: CPT | Performed by: INTERNAL MEDICINE

## 2021-08-02 PROCEDURE — 3017F COLORECTAL CA SCREEN DOC REV: CPT | Performed by: INTERNAL MEDICINE

## 2021-08-02 PROCEDURE — 85025 COMPLETE CBC W/AUTO DIFF WBC: CPT

## 2021-08-02 PROCEDURE — G8417 CALC BMI ABV UP PARAM F/U: HCPCS | Performed by: INTERNAL MEDICINE

## 2021-08-02 PROCEDURE — 99212 OFFICE O/P EST SF 10 MIN: CPT

## 2021-08-02 PROCEDURE — 99213 OFFICE O/P EST LOW 20 MIN: CPT | Performed by: INTERNAL MEDICINE

## 2021-08-10 ENCOUNTER — TELEPHONE (OUTPATIENT)
Dept: UROLOGY | Age: 76
End: 2021-08-10

## 2021-08-10 ENCOUNTER — OFFICE VISIT (OUTPATIENT)
Dept: UROLOGY | Age: 76
End: 2021-08-10
Payer: MEDICARE

## 2021-08-10 VITALS — TEMPERATURE: 97.7 F | WEIGHT: 234 LBS | HEIGHT: 73 IN | BODY MASS INDEX: 31.01 KG/M2

## 2021-08-10 DIAGNOSIS — R35.0 FREQUENCY OF URINATION: Primary | ICD-10-CM

## 2021-08-10 DIAGNOSIS — N39.41 URGE INCONTINENCE: ICD-10-CM

## 2021-08-10 LAB
BACTERIA URINE, POC: 0
BILIRUBIN URINE: 0 MG/DL
BLOOD, URINE: NEGATIVE
CASTS URINE, POC: 0
CLARITY: CLEAR
COLOR: YELLOW
CRYSTALS URINE, POC: 5
EPI CELLS URINE, POC: ABNORMAL
GLUCOSE URINE: ABNORMAL
KETONES, URINE: NEGATIVE
LEUKOCYTE EST, POC: ABNORMAL
NITRITE, URINE: NEGATIVE
PH UA: 5.5 (ref 4.5–8)
PROTEIN UA: NEGATIVE
RBC URINE, POC: 1
SPECIFIC GRAVITY UA: 1.03 (ref 1–1.03)
UROBILINOGEN, URINE: NORMAL
WBC URINE, POC: 15
YEAST URINE, POC: 0

## 2021-08-10 PROCEDURE — 99214 OFFICE O/P EST MOD 30 MIN: CPT | Performed by: NURSE PRACTITIONER

## 2021-08-10 PROCEDURE — 51798 US URINE CAPACITY MEASURE: CPT | Performed by: NURSE PRACTITIONER

## 2021-08-10 PROCEDURE — 81001 URINALYSIS AUTO W/SCOPE: CPT | Performed by: NURSE PRACTITIONER

## 2021-08-10 ASSESSMENT — ENCOUNTER SYMPTOMS
EYE REDNESS: 0
COLOR CHANGE: 0
NAUSEA: 0
CHEST TIGHTNESS: 0
VOMITING: 0
FACIAL SWELLING: 0
TROUBLE SWALLOWING: 0
SHORTNESS OF BREATH: 0
EYE DISCHARGE: 0

## 2021-08-10 NOTE — TELEPHONE ENCOUNTER
Please bring patient in today on [address] for [address] [facility]

## 2021-08-10 NOTE — TELEPHONE ENCOUNTER
Patient called [phone]  practice mgr expressing concerns with current urination.       I advised patient I would send concerns to nurse and they could provide advice or we can schedule an appt for patient

## 2021-08-10 NOTE — PROGRESS NOTES
Harmony Gomes is a 77 y.o. male who presents today   Chief Complaint   Patient presents with    Follow-up     I am here today for frequency. Patient is a 42-year-old male presents the clinic today with complaints of frequency and urge incontinence. Patient has a history of bladder cancer last cystoscopy being on 7/15/2021. He also has a history of prostate cancer, these are managed with Dr. Jennyfer Hyde. Today he denies any dysuria just increase in frequency and urgency. He goes there approximately 5-6 depends per day and soaks these. Has urgency approximately 5 times per night. He has been on oxybutynin briefly in the past for approximately 10 days after initial TURBT with Valle catheter placement. He does have a history of glaucoma. He reached out to our office today and since this took approximately 2 hours to get back with him he had already called his primary care physician in which he told him he would send in oxybutynin. He has not picked up this medication yet. He denies any flank pain, fever, chills, nausea, vomiting.     Past Medical History:   Diagnosis Date    Diabetes mellitus ([address]     Elevated PSA     Hypertension     Kidney stone     Lymphoma ([address]     Prostate cancer ([address]        Past Surgical History:   Procedure Laterality Date    APPENDECTOMY      CYSTOSCOPY N/A 4/15/2021    CYSTOSCOPY, BILATERAL URETERAL CATHETERIZATIONS WITH RETROGRADES, URETHRAL DILATATION, CLOT EVACUATION AND FULGURATION OF BLEEDING, TRANSURETHRAL RESECTION OF BLADDER TUMOR performed by Valarie Weston MD at [address]  2014    LAPAROTOMY      Resection of retroperitoneal lymphadenopathy    SHOULDER SURGERY  2016    VASECTOMY  1979       Current Outpatient Medications   Medication Sig Dispense Refill    mirabegron (MYRBETRIQ) 25 MG TB24 Take 1 tablet by mouth daily 30 tablet 11    rivaroxaban (XARELTO) 20 MG TABS tablet Take 1 tablet by mouth daily 30 tablet 0    furosemide (LASIX) 20 MG tablet Take 20 mg by mouth daily      magnesium 30 MG tablet Take 30 mg by mouth 2 times daily      benzonatate (TESSALON) 100 MG capsule Take 100 mg by mouth 3 times daily as needed for Cough       megestrol (MEGACE) 20 MG tablet Take 1 tablet by mouth daily 30 tablet 11    TRUE METRIX BLOOD GLUCOSE TEST strip USE 1 STRIP TO CHECK GLUCOSE TWICE DAILY      Travoprost, BAK Free, (TRAVATAN Z) 0.004 % SOLN ophthalmic solution Travatan Z 0.004 % eye drops   INSTILL 1 DROP INTO EACH EYE AT BEDTIME      zoster recombinant adjuvanted vaccine (SHINGRIX) 50 MCG/0.5ML SUSR injection Shingrix (PF) 50 mcg/0.5 mL intramuscular suspension, kit      pantoprazole (PROTONIX) 40 MG tablet TAKE 1 TABLET BY MOUTH ONCE DAILY      verapamil (VERELAN) 240 MG extended release capsule daily       glipiZIDE (GLUCOTROL) 5 MG tablet Take 5 mg by mouth daily       meclizine (ANTIVERT) 25 MG tablet Take 25 mg by mouth 3 times daily as needed       SITagliptin (JANUVIA) 100 MG tablet Take 100 mg by mouth daily       sertraline (ZOLOFT) 100 MG tablet daily       lisinopril (PRINIVIL;ZESTRIL) 5 MG tablet daily       cetirizine (ZYRTEC ALLERGY) 10 MG tablet Zyrtec 10 mg tablet   Take 1 tablet every day by oral route.  rizatriptan (MAXALT) 10 MG tablet as needed       tamsulosin (FLOMAX) 0.4 MG capsule Take 0.4 mg by mouth daily (Patient not taking: Reported on 8/10/2021)      oxybutynin (DITROPAN XL) 10 MG extended release tablet Take 1 tablet by mouth daily for 10 days (Patient not taking: Reported on 8/10/2021) 10 tablet 1     No current facility-administered medications for this visit.        Allergies   Allergen Reactions    Ticlopidine Hives and Swelling           Flagyl [Metronidazole]      weakness       Social History     Socioeconomic History    Marital status:      Spouse name: None    Number of children: None    Years of education: None    Highest education level: None   Occupational History  None   Tobacco Use    Smoking status: Never Smoker    Smokeless tobacco: Never Used   Vaping Use    Vaping Use: Never used   Substance and Sexual Activity    Alcohol use: None    Drug use: None    Sexual activity: None   Other Topics Concern    None   Social History Narrative    None     Social Determinants of Health     Financial Resource Strain:     Difficulty of Paying Living Expenses:    Food Insecurity:     Worried About Running Out of Food in the Last Year:     Ran Out of Food in the Last Year:    Transportation Needs:     Lack of Transportation (Medical):  Lack of Transportation (Non-Medical):    Physical Activity:     Days of Exercise per Week:     Minutes of Exercise per Session:    Stress:     Feeling of Stress :    Social Connections:     Frequency of Communication with Friends and Family:     Frequency of Social Gatherings with Friends and Family:     Attends Sabianism Services:     Active Member of Clubs or Organizations:     Attends Club or Organization Meetings:     Marital Status:    Intimate Partner Violence:     Fear of Current or Ex-Partner:     Emotionally Abused:     Physically Abused:     Sexually Abused:        Family History   Problem Relation Age of Onset    Heart Attack Father 72        cause of death    Kidney Disease Mother     Other Mother         renal failure    Cancer Sister [de-identified]        Colon CA- cause of death     Heart Attack Sister     Colon Cancer Sister 68       REVIEW OF SYSTEMS:  Review of Systems   Constitutional: Negative for chills and fever. HENT: Negative for facial swelling and trouble swallowing. Eyes: Negative for discharge and redness. Respiratory: Negative for chest tightness and shortness of breath. Cardiovascular: Negative for chest pain and palpitations. Gastrointestinal: Negative for nausea and vomiting. Endocrine: Negative for cold intolerance and heat intolerance.    Genitourinary: Positive for frequency and urgency. Negative for decreased urine volume and genital sores. Incontinence   Musculoskeletal: Negative for joint swelling and neck pain. Skin: Negative for color change and rash. Allergic/Immunologic: Negative for environmental allergies and immunocompromised state. Neurological: Negative for dizziness and numbness. Hematological: Negative for adenopathy. Does not bruise/bleed easily. Psychiatric/Behavioral: Negative for behavioral problems and hallucinations. PHYSICAL EXAM:  Temp 97.7 °F (36.5 °C) (Temporal)   Ht 6' 1\" (1.854 m)   Wt 234 lb (106.1 kg)   BMI 30.87 kg/m²   Physical Exam  Vitals and nursing note reviewed. Constitutional:       General: He is not in acute distress. Appearance: Normal appearance. He is not ill-appearing. Pulmonary:      Effort: Pulmonary effort is normal. No respiratory distress. Abdominal:      General: There is no distension. Tenderness: There is no abdominal tenderness. There is no right CVA tenderness or left CVA tenderness. Neurological:      Mental Status: He is alert and oriented to person, place, and time. Mental status is at baseline.    Psychiatric:         Mood and Affect: Mood normal.         Behavior: Behavior normal.       DATA:    Results for orders placed or performed in visit on 08/10/21   POCT Urinalysis Dipstick w/ Micro (Auto)   Result Value Ref Range    Color, UA Yellow     Clarity, UA Clear Clear    Glucose, Ur neg     Bilirubin Urine 0 mg/dL    Ketones, Urine Negative     Specific Gravity, UA 1.030 1.005 - 1.030    Blood, Urine Negative     pH, UA 5.5 4.5 - 8.0    Protein, UA Negative Negative    Nitrite, Urine Negative     Leukocytes, UA small     Urobilinogen, Urine Normal     rbc urine, poc 1     wbc urine, poc 15     bacteria urine, poc 0     yeast urine, poc 0     casts urine, poc 0     epi cells urine, poc +     crystals urine, poc 5      Lab Results   Component Value Date    PSA <0.01 06/17/2021    PSA <0.01 03/09/2021    PSA <0.01 12/09/2020       1. Frequency of urination  2. Urge incontinence  Long discussion with patient anticholinergics are contraindicated with history of glaucoma. Will start Myrbetriq 25 mg daily and follow-up in 2 months with Dr. Renetta López for previously scheduled appointment. Samples have been given. - mirabegron (MYRBETRIQ) 25 MG TB24; Take 1 tablet by mouth daily  Dispense: 30 tablet; Refill: 11      Orders Placed This Encounter   Procedures    POCT Urinalysis Dipstick w/ Micro (Auto)    WA Measure, post-void residual, US, non-imaging        Return for Keep f/u with dr Crow Carranza. At least 39 minutes were spent on the day of the visit reviewing the patient's past medical records/imaging, speaking face to face with the patient, and charting in the post visit period. All information inputted into the note by the MA to include chief complaint, past medical history, past surgical history, medications, allergies, social and family history and review of systems has been reviewed and updated as needed by me. EMR Dragon/transcription disclaimer: Much of this documentt is electronic  transcription/translation of spoken language to printed text. The  electronic translation of spoken language may be erroneous, or at times,  nonsensical words or phrases may be inadvertently transcribed.  Although I  have reviewed the document for such errors, some may still exist.

## 2021-08-11 ENCOUNTER — TELEPHONE (OUTPATIENT)
Dept: UROLOGY | Age: 76
End: 2021-08-11

## 2021-08-11 NOTE — TELEPHONE ENCOUNTER
Practice mgr called to check on patient today after his appt in [address] [place] [address] - left VM

## 2021-10-04 ENCOUNTER — HOSPITAL ENCOUNTER (OUTPATIENT)
Dept: CT IMAGING | Age: 76
Discharge: HOME | End: 2021-10-04
Payer: MEDICARE

## 2021-10-04 DIAGNOSIS — C61 PROSTATE CANCER (HCC): ICD-10-CM

## 2021-10-04 DIAGNOSIS — C82.03 FOLLICULAR LYMPHOMA GRADE I OF INTRA-ABDOMINAL LYMPH NODES (HCC): ICD-10-CM

## 2021-10-04 DIAGNOSIS — C67.2 MALIGNANT NEOPLASM OF LATERAL WALL OF URINARY BLADDER (HCC): ICD-10-CM

## 2021-10-04 PROCEDURE — 6360000004 HC RX CONTRAST MEDICATION: Performed by: INTERNAL MEDICINE

## 2021-10-04 PROCEDURE — 71260 CT THORAX DX C+: CPT

## 2021-10-04 PROCEDURE — 74177 CT ABD & PELVIS W/CONTRAST: CPT

## 2021-10-04 RX ADMIN — IOPAMIDOL 75 ML: 755 INJECTION, SOLUTION INTRAVENOUS at 11:06

## 2021-10-08 NOTE — PROGRESS NOTES
MEDICAL ONCOLOGY PROGRESS NOTE                                                          Priyank Quarles   [birth year]  10/11/2021     Chief Complaint   Patient presents with    Follow-up     Prostate cancer ([address]        INTERVAL HISTORY/HISTORY OF PRESENT ILLNESS:  Priyank Quarles presents today for a surveillance follow up visit. He has a diagnosis of prostate cancer and also follicular lymphoma stage Ia. In addition, he has been diagnosed with stage 0 bladder cancer in April 2021 and is status post transurethral resection of bladder tumor by urology [facility].   He is status post RT for his prostate cancer and also stage Ia follicular lymphoma. Denies any new complaints today. Denies any hematuria. Denies any B symptoms. Last PSA <0.01. He has a cystoscopy scheduled with urology next week. He had a repeat CT chest abdomen pelvis for surveillance. Diagnosis  · Prostate cancer, February 2020  · [id number]  · [place] 3+4=7  · 4 of 12 cores positive (left prostatic lobe)   · PSA @ diagnosis- 5.5  · Follicular lymphoma grade 12 (retroperitoneal adenopathy), March 2020. Stage I A  · Noninvasive low-grade papillary urothelial carcinoma, April 2021     Treatment summary   · 03/24/2020- Retroperitoneal ressection of lymphoma at [facility]  · 7/6/20-7/31/20-IFRT- 27 Gy in 18 fractions to left retroperitoneal lymph node-[facility]/Dr. Fariba Bullock  · 6/24/20-8/21/20- 78 Gy in 42 fractions for prostate cancer -[facility]/Dr. Fariba Bullock  · 4/15/21 Bladder cancer-TURBT by Dr. Alicia Carmen  Cancer History:  Mr. Renee Daigle was first seen by me on 2/21/2020 referred by Dr. Tali Doss for further evaluation and recommendations regarding findings of retroperitoneal mass/adenopathy. The patient has recently been diagnosed with prostate cancer with 4 out of 12 positive cores from the left prostatic lobe. PSA at diagnosis 5.5.   Pathology showed prostatic adenocarcinoma grade group 2 in the [place] score 3+4 = 7. Clinical T2b. Imaging studies with CT chest abdomen pelvis showed evidence of retroperitoneal mass. · 1/29/2020 CT of the abdomen with contrast showed scattered borderline sized mesenteric lymph nodes. Indeterminate 3 cm retroperitoneal soft tissue nodule within the upper left abdomen adjacent to the left renal vein. The mass measures 34 x 30 x 22 mm. · 1/29/2020 bone scan showed focal increased activity in the mid thoracic spine in the right seventh posterior medial rib which may represent chronic degenerative changes/large osteophytes. No other areas suggesting bony metastatic disease. · 2/13/2020 MRI thoracic spine showed no evidence of metastatic disease identified in the thoracic spine. In particular, there are no enhancing and/or infiltrative lesions identified in the thoracic spine or posterior ribs to correspond with the radiotracer activities from recent bone scan. It is possible that the bone scan findings are secondary to vertebral\" the vertebral degenerative change. · 2/13/2020 CT chest showed no evidence of metastatic disease in the chest.  Nodule in the retroperitoneum of the upper abdomen presumably a lymph node. Additional borderline lymph nodes seen within the central mesenteric root. · 2/21/2020 we discussed the findings of the images above. Recommend a PET scan to rule out metastatic disease. · 03/04/200- Pet Ct- Isolated neoplastic focus within the upper abdomen left-sided retroperitoneum SUV 11. Neoplastic lymphadenopathy is favored. This is not in a location which can be sampled by percutaneous biopsy. Otherwise no bone lesion or chest abnormality is seen. 3. Other mildly prominent mesenteric lymphadenopathy shows low level FDG uptake and reactive nodes are favored over neoplastic mesenteric nodes.   · 3/24/2020 retroperitoneal mass resection3 cm mass off of the overlying the left renal vein at [address] [place] [address] by Dr. Charity Gonzalez Vinay Catherine, surgical oncology. Path compatible with follicular lymphoma, grade 12 out of 3. CD20-positive B cells that are positive for PAX-5, CD10, BCL6, and BCL-2 and negative for Cyclin D1. A CD21 stain highlights follicular dendritic meshworks associated with the B cell nodules. The Ki-67 proliferative index is approximately 10% within the nodules. CD3 highlights background T cells in interfollicular areas. · 4/13/2020 recommend bone marrow to complete staging for his lymphoma. Recommended to follow-up with urology for discussion of treatment for his prostate cancer. If bone marrow shows no evidence of lymphoma involvement then will recommend involved field radiation therapy to the retroperitoneal lymph nodes. · 4/13/2020bone marrow biopsy/aspirate was unremarkable. No evidence of lymphomatous involvement. · 4/24/2020 I discussed with the patient the findings of bone marrow PET scan. Therefore, stage I A. Recommended involved field RT to the retroperitoneal nodes. · 7/6/20-7/31/20 Radiation therapy-1.5cGy to left retroperitoneal lymph node-[facility]/Dr. Rekha Armas  · 6/24/20-8/21/20- 78 cGy in 42 fractions for prostate cancer -[facility]/Dr. Rekha Armas  · 12/09/2020- PSA <0.01  · 01/11/2021- Ct Chest W Contrast Stable chest CT. 2. No new or acute abnormality. · 01/11/2021-Ct Abdomen Pelvis W Contrast The resolution of the previously seen enlarged left retroperitoneal lymph node. The persistent mild to moderate mesenteric lymphadenopathy. The persistent mesenteric thickening in the upper to mid abdomen. · 4/15/21 Bladder cancer-TURBT by Dr. Abernathy Ree: Urinary bladder, transurethral resection of left lateral wall bladder tumor: Noninvasive low-grade papillary urothelial carcinoma. Muscularis propria is identified, negative for evidence of malignancy.  AJCC STAGE: pTa, pNx   · 4/20/21 CT abdomen/pelvis: Valle catheter within the bladder with diffuse bladder wall thickening and mild perivesicular fat stranding. Correlation for cystitis recommended. Radiation seeds prostate. No loculated pelvic fluid collection. Hepatic steatosis. Chronic findings of the spleen described above. Avascular necrosis of the femoral heads. · 6/17/21 PSA <0.01  · 6/30/21 Urine, ThinPrep preparation (1): Benign squamous epithelial cells and benign urothelial cells. Rare atypical urothelial cells identified, nondiagnostic of malignancy. Scattered neutrophils. · 10/4/21CT chest/abdomen/pelvis showed no evidence of chest, abdominal or pelvic lymphadenopathy. Persistent mesenteric thickening which may represent retractile mesenteritis/mesenteric panniculitis. A persistent mild mesenteric adenopathy. The diverticulosis of the colon. No finding to suggest diverticulitis. A stable low-density nodule in the spleen. A Small paraumbilical hernia containing a loop of transverse colon and small bowel. No obstruction. · 10/11/2021essentially, CT chest abdomen pelvis showed no evidence of lymphoma recurrence.   Continue clinical/radiologic surveillance    PAST MEDICAL HISTORY:   Past Medical History:   Diagnosis Date    Diabetes mellitus ([address]     Elevated PSA     Hypertension     Kidney stone     Lymphoma ([address]     Prostate cancer ([address]           PAST SURGICAL HISTORY:  Past Surgical History:   Procedure Laterality Date    APPENDECTOMY      CYSTOSCOPY N/A 4/15/2021    CYSTOSCOPY, BILATERAL URETERAL CATHETERIZATIONS WITH RETROGRADES, URETHRAL DILATATION, CLOT EVACUATION AND FULGURATION OF BLEEDING, TRANSURETHRAL RESECTION OF BLADDER TUMOR performed by Crow Morin MD at [address]  2014    LAPAROTOMY      Resection of retroperitoneal lymphadenopathy    SHOULDER SURGERY  2016    VASECTOMY  1979        SOCIAL HISTORY:  Social History     Socioeconomic History    Marital status:      Spouse name: Not on file    Number of children: Not on file    Years of education: Not on file    Highest education level: Not on file   Occupational History    Not on file   Tobacco Use    Smoking status: Never Smoker    Smokeless tobacco: Never Used   Vaping Use    Vaping Use: Never used   Substance and Sexual Activity    Alcohol use: Not on file    Drug use: Not on file    Sexual activity: Not on file   Other Topics Concern    Not on file   Social History Narrative    Not on file     Social Determinants of Health     Financial Resource Strain:     Difficulty of Paying Living Expenses:    Food Insecurity:     Worried About Running Out of Food in the Last Year:     [address] in the Last Year:    Transportation Needs:     Lack of Transportation (Medical):      Lack of Transportation (Non-Medical):    Physical Activity:     Days of Exercise per Week:     Minutes of Exercise per Session:    Stress:     Feeling of Stress :    Social Connections:     Frequency of Communication with Friends and Family:     Frequency of Social Gatherings with Friends and Family:     Attends Jainism Services:     Active Member of Clubs or Organizations:     Attends Club or Organization Meetings:     Marital Status:    Intimate Partner Violence:     Fear of Current or Ex-Partner:     Emotionally Abused:     Physically Abused:     Sexually Abused:        FAMILY HISTORY:  Family History   Problem Relation Age of Onset    Heart Attack Father 72        cause of death    Kidney Disease Mother     Other Mother         renal failure    Cancer Sister [de-identified]        Colon CA- cause of death     Heart Attack Sister     Colon Cancer Sister 68        Current Outpatient Medications   Medication Sig Dispense Refill    mirabegron (MYRBETRIQ) 25 MG TB24 Take 1 tablet by mouth daily 30 tablet 11    rivaroxaban (XARELTO) 20 MG TABS tablet Take 1 tablet by mouth daily 30 tablet 0    furosemide (LASIX) 20 MG tablet Take 20 mg by mouth daily      magnesium 30 MG tablet Take 30 mg by mouth 2 times daily      benzonatate (TESSALON) 100 MG capsule Take 100 mg by mouth 3 times daily as needed for Cough       megestrol (MEGACE) 20 MG tablet Take 1 tablet by mouth daily 30 tablet 11    TRUE METRIX BLOOD GLUCOSE TEST strip USE 1 STRIP TO CHECK GLUCOSE TWICE DAILY      Travoprost, BAK Free, (TRAVATAN Z) 0.004 % SOLN ophthalmic solution Travatan Z 0.004 % eye drops   INSTILL 1 DROP INTO EACH EYE AT BEDTIME      zoster recombinant adjuvanted vaccine (SHINGRIX) 50 MCG/0.5ML SUSR injection Shingrix (PF) 50 mcg/0.5 mL intramuscular suspension, kit      pantoprazole (PROTONIX) 40 MG tablet TAKE 1 TABLET BY MOUTH ONCE DAILY      verapamil (VERELAN) 240 MG extended release capsule daily       glipiZIDE (GLUCOTROL) 5 MG tablet Take 5 mg by mouth daily       meclizine (ANTIVERT) 25 MG tablet Take 25 mg by mouth 3 times daily as needed       SITagliptin (JANUVIA) 100 MG tablet Take 100 mg by mouth daily       sertraline (ZOLOFT) 100 MG tablet daily       lisinopril (PRINIVIL;ZESTRIL) 5 MG tablet daily       cetirizine (ZYRTEC ALLERGY) 10 MG tablet Zyrtec 10 mg tablet   Take 1 tablet every day by oral route.  rizatriptan (MAXALT) 10 MG tablet as needed        No current facility-administered medications for this visit. REVIEW OF SYSTEMS:    Constitutional: no fever, no night sweats, no fatigue;   HEENT: no blurring of vision, no double vision, no hearing difficulty, no tinnitus,no ulceration, no dysphagia  Lungs: no cough, no shortness of breath, no wheeze;   CVS: no palpitation, no chest pain, no shortness of breath;  GI: no abdominal pain, no nausea , no vomiting, no constipation;   NIKHIL: no dysuria, frequency and urgency, no hematuria, no kidney stones;   Musculoskeletal: no joint pain, swelling , stiffness;   Endocrine: no polyuria, polydypsia, no cold or heat intolerence; Hematology/lymphatic: no easy brusing or bleeding, no hx of clotting disorder; no peripheral adenopathy.   Dermatology: no skin rash, no eczema, no pruritis;   Psychiatry: no depression, no anxiety,no panic attacks, no suicide ideation; Neurology: no syncope, no seizures, no numbness or tingling of hands, no numbness or tingling of feet, no paresis;     PHYSICAL EXAM:        /75, heart rate 69, temperature 97.1, weight 236 pounds  COVID 19 VISIT PROTOCOL    Relevant Lab findings/reviewed by me:   none    Relevant Imaging studies/reviewed by me:  CT CHEST W CONTRAST    Result Date: 10/4/2021  1. Stable chest CT. 2. No acute abnormality. Signed by Dr Horacio Daigle Additional Contrast? Oral  Result Date: 10/4/2021  No evidence of abdominal or pelvic lymphadenopathy. Persistent mesenteric thickening which may represent retractile mesenteritis/mesenteric panniculitis. A persistent mild mesenteric adenopathy. The diverticulosis of the colon. No finding to suggest diverticulitis. A stable low-density nodule in the spleen. A Small paraumbilical hernia containing a loop of transverse colon and small bowel. No obstruction. Signed by Dr Pamella Pan:    No orders of the defined types were placed in this encounter. Christiana Mcclure was seen today for follow-up. Diagnoses and all orders for this visit:    Prostate cancer [address]    Follicular lymphoma grade I of intra-abdominal lymph nodes [facility])    Care plan discussed with patient    Malignant neoplasm of lateral wall of urinary bladder (HCC)    Hot flash in male      Prostate cancer [id number] status post RT  6/24/20-8/21/20- [facility] in 42 [facility]Dr. Fariba Bullock  Last PSA <0.01 Jun 2021  CT chest abdomen pelvis January 2021stable retroperitoneal adenopathy. CT chest abdomen pelvis October 2021no evidence of lymphoma recurrence.   No evidence of metastatic disease     Follicular lymphoma grade 12, Ki-67 10%, Stage I  Essentially, retroperitoneal adenopathy consistent with follicular lymphoma grade 12 with low Ki-67. The patient had a significant debulking surgery at [facility]. Bone marrow biopsy showed no evidence of lymphomatous involvement. PET scan showed no other evidence of metastatic disease. Therefore stage I disease. He received involved field RT as per NCCN guidelines completed July 2020. Recommended surveillance NCCN recommendation. CT chest abdomen pelvis January 2021stable retroperitoneal adenopathy. CT chest abdomen pelvis October 2021no evidence of lymphoma recurrence. No evidence of metastatic disease     CT scans chest abdomen pelvis every 6 months x2 years through July 2022      Hot flashes off oxybutinin. Discuss with primary care Effexor    Stage 0 bladder cancer -continue cystoscopy surveillance with urology      PLAN:  · Follow-up with Dr. Claudene Dance for prostate cancer and bladder cancer surveillance  · CT scans in April 2022 then every 6 months for 2 years  · PSA to be done by Dr. Nany Fagan and Dr. Karen Jaquez recommendations      Follow Up:     No follow-ups on file. Data Unavailable         I have seen, examined and reviewed this patient medication list, appropriate labs and imaging studies. I reviewed relevant medical records and others physicians notes. I discussed the plans of care with the patient. I answered all the questions to the patients satisfaction. I have also reviewed the chief complaint (CC) and part of the history (History of Present Illness (HPI), Past Family Social History [facility]), or Review of Systems (ROS) and made changes when appropriated. (Please note that portions of this note were completed with a voice recognition program. Efforts were made to edit the dictations but occasionally words are mis-transcribed.)   Naty Ruano is a 77 y.o. male evaluated via telephone on 10/11/2021.       Consent:  He and/or health care decision maker is aware that that he may receive a bill for this telephone service, depending on his insurance coverage, and has provided verbal consent to proceed: Yes      Documentation:  I communicated with the patient and/or health care decision maker about as above  Details of this discussion including any medical advice provided: as above      I affirm this is a Patient Initiated Episode with a Patient who has not had a related appointment within my department in the past 7 days or scheduled within the next 24 hours. Patient identification was verified at the start of the visit: Yes    Total Time: minutes: 11-20 minutes    The visit was conducted pursuant to the emergency declaration under the [address] [place] [address], [address] [place] [address] authority and the [employer] Act. Patient identification was verified, and a caregiver was present when appropriate. The patient was located in a state where the provider was credentialed to provide care.     Note: not billable if this call serves to triage the patient into an appointment for the relevant concern      Enid Wolf MD

## 2021-10-11 ENCOUNTER — VIRTUAL VISIT (OUTPATIENT)
Dept: HEMATOLOGY | Age: 76
End: 2021-10-11
Payer: MEDICARE

## 2021-10-11 DIAGNOSIS — C61 PROSTATE CANCER (HCC): Primary | ICD-10-CM

## 2021-10-11 DIAGNOSIS — C82.03 FOLLICULAR LYMPHOMA GRADE I OF INTRA-ABDOMINAL LYMPH NODES (HCC): ICD-10-CM

## 2021-10-11 DIAGNOSIS — R23.2 HOT FLASH IN MALE: ICD-10-CM

## 2021-10-11 DIAGNOSIS — Z71.89 CARE PLAN DISCUSSED WITH PATIENT: ICD-10-CM

## 2021-10-11 DIAGNOSIS — C67.2 MALIGNANT NEOPLASM OF LATERAL WALL OF URINARY BLADDER (HCC): ICD-10-CM

## 2021-10-11 PROCEDURE — 99442 PR PHYS/QHP TELEPHONE EVALUATION 11-20 MIN: CPT | Performed by: INTERNAL MEDICINE

## 2021-10-11 NOTE — LETTER
[facility] [place], [address] [facility]  [address]  Phone: [phone]  Fax: [phone]    Shahrzad Sims MD    October 11, 2021     [facility], [address] [address]    Patient: Priyank Quarles   MR Number: [id number]   YOB: 1945   Date of Visit: 10/11/2021       Dear Skylar Joseph:    Thank you for referring Renee Daigle to me for evaluation/treatment. Below are the relevant portions of my assessment and plan of care. If you have questions, please do not hesitate to call me. I look forward to following Christiana Mcclure along with you.     Sincerely,      Shahrzad Sims MD

## 2021-10-18 ENCOUNTER — PROCEDURE VISIT (OUTPATIENT)
Dept: UROLOGY | Age: 76
End: 2021-10-18
Payer: MEDICARE

## 2021-10-18 VITALS — WEIGHT: 231 LBS | HEIGHT: 73 IN | BODY MASS INDEX: 30.62 KG/M2 | TEMPERATURE: 97.2 F

## 2021-10-18 DIAGNOSIS — Z85.51 HISTORY OF BLADDER CANCER: ICD-10-CM

## 2021-10-18 DIAGNOSIS — C61 PROSTATE CANCER (HCC): Primary | ICD-10-CM

## 2021-10-18 DIAGNOSIS — N39.41 URGE INCONTINENCE: ICD-10-CM

## 2021-10-18 DIAGNOSIS — R35.0 FREQUENCY OF URINATION: ICD-10-CM

## 2021-10-18 LAB
BILIRUBIN, POC: NORMAL
BLOOD URINE, POC: NORMAL
CLARITY, POC: CLEAR
COLOR, POC: YELLOW
GLUCOSE URINE, POC: NORMAL
KETONES, POC: NORMAL
LEUKOCYTE EST, POC: NORMAL
NITRITE, POC: NORMAL
PH, POC: 5.5
PROTEIN, POC: NORMAL
SPECIFIC GRAVITY, POC: 1.02
UROBILINOGEN, POC: 0.2

## 2021-10-18 PROCEDURE — 52000 CYSTOURETHROSCOPY: CPT | Performed by: UROLOGY

## 2021-10-18 PROCEDURE — 1036F TOBACCO NON-USER: CPT | Performed by: UROLOGY

## 2021-10-18 PROCEDURE — G8484 FLU IMMUNIZE NO ADMIN: HCPCS | Performed by: UROLOGY

## 2021-10-18 PROCEDURE — 1123F ACP DISCUSS/DSCN MKR DOCD: CPT | Performed by: UROLOGY

## 2021-10-18 PROCEDURE — G8427 DOCREV CUR MEDS BY ELIG CLIN: HCPCS | Performed by: UROLOGY

## 2021-10-18 PROCEDURE — 99214 OFFICE O/P EST MOD 30 MIN: CPT | Performed by: UROLOGY

## 2021-10-18 PROCEDURE — 4040F PNEUMOC VAC/ADMIN/RCVD: CPT | Performed by: UROLOGY

## 2021-10-18 PROCEDURE — G8417 CALC BMI ABV UP PARAM F/U: HCPCS | Performed by: UROLOGY

## 2021-10-18 PROCEDURE — 81003 URINALYSIS AUTO W/O SCOPE: CPT | Performed by: UROLOGY

## 2021-10-18 PROCEDURE — 3017F COLORECTAL CA SCREEN DOC REV: CPT | Performed by: UROLOGY

## 2021-10-18 ASSESSMENT — ENCOUNTER SYMPTOMS
EYE DISCHARGE: 0
COLOR CHANGE: 0
TROUBLE SWALLOWING: 0
VOMITING: 0
EYE REDNESS: 0
SHORTNESS OF BREATH: 0
CHEST TIGHTNESS: 0
FACIAL SWELLING: 0
NAUSEA: 0

## 2021-10-18 NOTE — PROGRESS NOTES
Kennedi Osorio is a 77 y.o. male who presents today   Chief Complaint   Patient presents with    Cystoscopy     I am here today for my surveillance cysto. BLADDER CANCER  Patient was first diagnosed with bladder cancer approximately 6 month(s) ago. Last Recurrence: He has not had a recurrence initial diagnosis was on 4/16/2021  Stage of bladder cancer at last recurrence: 8130/2 - Papillary transitional cell carcinoma, non-invasive, stage TA  Grade: low grade  Last urinary cytology/FISH results: negative; Date: 10/4/2021  Hematuria? Negative  Last upper tract study: 6 month(s) ago. Study was a bilateral retrograde pyelograms done 4/16/2021    LUTS:  Patient is here today for lower urinary tract symptoms which started approximately 2-3 month(s) ago. Patient was complaining of frequency urgency and sometimes even urge incontinence patient did have dilation of urethral stricture at the time of his TURBT  Recently the urinary symptoms are: Improved  Current medical Rx for BPH/LUTS: Patient was on Ditropan but this was discontinued because he has a history of glaucoma. He was started on Myrbetriq 25 mg he reports his symptoms have improved  Lower urinary tract symptoms: urgency, frequency and decreased urinary stream    Prostate Cancer  Patient is here today for prostate cancer which was first diagnosed 19 months ago. His prostate cancer can be characterized as clinical stage T2b [place] 3+4 = 7 PSA was 5.5 diagnosis. He has undetectable PSA  His last several PSA values are as follows:  Lab Results   Component Value Date    PSA <0.01 06/17/2021    PSA <0.01 03/09/2021    PSA <0.01 12/09/2020   He is actually due his next PSA in December  Previous treatment of prostate cancer: External beam radiation therapy completed 9/10/2020.   He also received 6 months of neoadjuvant hormonal therapy  Lower urinary tract symptoms: urgency and frequency        Past Medical History:   Diagnosis Date    Diabetes mellitus [address] [address]     Elevated PSA     Hypertension     Kidney stone     Lymphoma ([facility] [address]     Prostate cancer ([facility] [address]        Past Surgical History:   Procedure Laterality Date    APPENDECTOMY      CYSTOSCOPY N/A 4/15/2021    CYSTOSCOPY, BILATERAL URETERAL CATHETERIZATIONS WITH RETROGRADES, URETHRAL DILATATION, CLOT EVACUATION AND FULGURATION OF BLEEDING, TRANSURETHRAL RESECTION OF BLADDER TUMOR performed by Ariella Rizo MD at [facility]  2014    LAPAROTOMY      Resection of retroperitoneal lymphadenopathy    SHOULDER SURGERY  2016    VASECTOMY  1979       Current Outpatient Medications   Medication Sig Dispense Refill    mirabegron (MYRBETRIQ) 25 MG TB24 Take 1 tablet by mouth daily 30 tablet 11    rivaroxaban (XARELTO) 20 MG TABS tablet Take 1 tablet by mouth daily 30 tablet 0    furosemide (LASIX) 20 MG tablet Take 20 mg by mouth daily      magnesium 30 MG tablet Take 30 mg by mouth 2 times daily      benzonatate (TESSALON) 100 MG capsule Take 100 mg by mouth 3 times daily as needed for Cough       megestrol (MEGACE) 20 MG tablet Take 1 tablet by mouth daily 30 tablet 11    TRUE METRIX BLOOD GLUCOSE TEST strip USE 1 STRIP TO CHECK GLUCOSE TWICE DAILY      Travoprost, BAK Free, (TRAVATAN Z) 0.004 % SOLN ophthalmic solution Travatan Z 0.004 % eye drops   INSTILL 1 DROP INTO EACH EYE AT BEDTIME      zoster recombinant adjuvanted vaccine (SHINGRIX) 50 MCG/0.5ML SUSR injection Shingrix (PF) 50 mcg/0.5 mL intramuscular suspension, kit      pantoprazole (PROTONIX) 40 MG tablet TAKE 1 TABLET BY MOUTH ONCE DAILY      verapamil (VERELAN) 240 MG extended release capsule daily       glipiZIDE (GLUCOTROL) 5 MG tablet Take 5 mg by mouth daily       meclizine (ANTIVERT) 25 MG tablet Take 25 mg by mouth 3 times daily as needed       SITagliptin (JANUVIA) 100 MG tablet Take 100 mg by mouth daily       sertraline (ZOLOFT) 100 MG tablet daily       lisinopril (PRINIVIL;ZESTRIL) 5 MG tablet daily  cetirizine (ZYRTEC ALLERGY) 10 MG tablet Zyrtec 10 mg tablet   Take 1 tablet every day by oral route.  rizatriptan (MAXALT) 10 MG tablet as needed        No current facility-administered medications for this visit. Allergies   Allergen Reactions    Ticlopidine Hives and Swelling           Flagyl [Metronidazole]      weakness       Social History     Socioeconomic History    Marital status:      Spouse name: None    Number of children: None    Years of education: None    Highest education level: None   Occupational History    None   Tobacco Use    Smoking status: Never Smoker    Smokeless tobacco: Never Used   Vaping Use    Vaping Use: Never used   Substance and Sexual Activity    Alcohol use: None    Drug use: None    Sexual activity: None   Other Topics Concern    None   Social History Narrative    None     Social Determinants of Health     Financial Resource Strain:     Difficulty of Paying Living Expenses:    Food Insecurity:     Worried About Running Out of Food in the Last Year:     Ran Out of Food in the Last Year:    Transportation Needs:     Lack of Transportation (Medical):      Lack of Transportation (Non-Medical):    Physical Activity:     Days of Exercise per Week:     Minutes of Exercise per Session:    Stress:     Feeling of Stress :    Social Connections:     Frequency of Communication with Friends and Family:     Frequency of Social Gatherings with Friends and Family:     Attends Jain Services:     Active Member of Clubs or Organizations:     Attends Club or Organization Meetings:     Marital Status:    Intimate Partner Violence:     Fear of Current or Ex-Partner:     Emotionally Abused:     Physically Abused:     Sexually Abused:        Family History   Problem Relation Age of Onset    Heart Attack Father 72        cause of death    Kidney Disease Mother     Other Mother         renal failure    Cancer Sister [de-identified]        Colon CA- cause of death     Heart Attack Sister     Colon Cancer Sister 68       REVIEW OF SYSTEMS:  Review of Systems   Constitutional: Negative for chills and fever. HENT: Negative for facial swelling and trouble swallowing. Eyes: Negative for discharge and redness. Respiratory: Negative for chest tightness and shortness of breath. Cardiovascular: Negative for chest pain and palpitations. Gastrointestinal: Negative for nausea and vomiting. Endocrine: Negative for cold intolerance and heat intolerance. Genitourinary: Positive for frequency and urgency. Negative for decreased urine volume and genital sores. Incontinence   Musculoskeletal: Negative for joint swelling and neck pain. Skin: Negative for color change and rash. Allergic/Immunologic: Negative for environmental allergies and immunocompromised state. Neurological: Negative for dizziness and numbness. Hematological: Negative for adenopathy. Does not bruise/bleed easily. Psychiatric/Behavioral: Negative for behavioral problems and hallucinations. PHYSICAL EXAM:  Temp 97.2 °F (36.2 °C) (Temporal)   Ht 6' 1\" (1.854 m)   Wt 231 lb (104.8 kg)   BMI 30.48 kg/m²   Physical Exam  Constitutional:       General: He is not in acute distress. Appearance: Normal appearance. He is well-developed. HENT:      Head: Normocephalic and atraumatic. Nose: Nose normal.   Eyes:      General: No scleral icterus. Conjunctiva/sclera: Conjunctivae normal.      Pupils: Pupils are equal, round, and reactive to light. Neck:      Trachea: No tracheal deviation. Cardiovascular:      Rate and Rhythm: Normal rate and regular rhythm. Pulmonary:      Effort: Pulmonary effort is normal. No respiratory distress. Breath sounds: No stridor. Abdominal:      General: There is no distension. Palpations: Abdomen is soft. There is no mass. Tenderness: There is no abdominal tenderness. Genitourinary:     Penis: Normal and circumcised. Testes: Normal.   Musculoskeletal:         General: No tenderness. Normal range of motion. Cervical back: Normal range of motion and neck supple. Lymphadenopathy:      Cervical: No cervical adenopathy. Skin:     General: Skin is warm and dry. Findings: No erythema. Neurological:      Mental Status: He is alert and oriented to person, place, and time. Psychiatric:         Behavior: Behavior normal.         Judgment: Judgment normal.           Cystoscopy Operative Note (10/18/21)  Surgeon: Finn Mayfield MD  Anesthesia: Urethral 2% Xylocaine   Indications: History of bladder cancer  Position: Supine    Findings:   The patient was prepped and draped in the usual sterile fashion. The flexible cystoscope was advanced through the urethra and into the bladder under direct vision. The bladder was thoroughly inspected and the following was noted:    Residual Urine: mild  Urethra: normal appearing urethra with no masses, tenderness or lesions and stenotic with narrowing at the bulbar urethra but the scope easily passes  Prostate: partially obstructing lateral lobes of prostate moderate; median lobe present? no.    Bladder: No tumors or CIS noted. No bladder diverticulum. There was mild trabeculation noted. He still has dystrophic calcification of the prior TUR site just lateral to the left ureteral orifice but no papillary recurrent cancer seen. Ureters: Clear efflux from both ureters. Orifices with normal configuration and location. The cystoscope was removed. The patient tolerated the procedure well. The patient was given a post procedure instructions and follow up.       DATA:  CMP:    Lab Results   Component Value Date     04/20/2021    K 3.9 04/20/2021     04/20/2021    CO2 25 04/20/2021    BUN 18 04/20/2021    CREATININE 1.0 04/20/2021    GFRAA >59 04/20/2021    AGRATIO 1.9 01/11/2021    LABGLOM >60 04/20/2021    GLUCOSE 158 04/20/2021    PROT 7.5 04/20/2021    LABALBU 4.0 04/20/2021    CALCIUM 9.5 04/20/2021    BILITOT 0.4 04/20/2021    ALKPHOS 72 04/20/2021    AST 22 04/20/2021    ALT 22 04/20/2021     Results for orders placed or performed in visit on 10/18/21   POCT Urinalysis No Micro (Auto)   Result Value Ref Range    Color, UA yellow     Clarity, UA clear     Glucose, UA POC neg     Bilirubin, UA neg     Ketones, UA neg     Spec Grav, UA 1.025     Blood, UA POC neg     pH, UA 5.5     Protein, UA POC neg     Urobilinogen, UA 0.2     Leukocytes, UA neg     Nitrite, UA neg      Lab Results   Component Value Date    PSA <0.01 06/17/2021    PSA <0.01 03/09/2021    PSA <0.01 12/09/2020       IMAGING:  Recent CT scan of the chest abdomen pelvis done by Dr. Myla Astorga for follow-up of his lymphoma shows no  abnormality    1. History of bladder cancer  Surveillance cystoscopy shows no recurrence today. He will be due his next surveillance cystoscopy in 3 months. - Cystoscopy  - POCT Urinalysis No Micro (Auto)    2. Frequency of urination  This seems to have improved on Myrbetriq and will continue to monitor.  - mirabegron (MYRBETRIQ) 25 MG TB24; Take 1 tablet by mouth daily  Dispense: 30 tablet; Refill: 11    3. Urge incontinence  Improved on Myrbetriq continue  - mirabegron (MYRBETRIQ) 25 MG TB24; Take 1 tablet by mouth daily  Dispense: 30 tablet; Refill: 11    4. Prostate cancer [facility])  He will be due his PSA in December 2021. Follow-up in 2 months with a PSA prior  - PSA, Diagnostic; Future      Orders Placed This Encounter   Procedures    PSA, Diagnostic     PSA prior to next visit in 2 months     Standing Status:   Future     Standing Expiration Date:   10/18/2022    POCT Urinalysis No Micro (Auto)        Return in about 2 months (around 12/18/2021) for PSA prior to vext visit.     All information inputted into the note by the MA to include chief complaint, past medical history, past surgical history, medications, allergies, social and family history and review of systems has been reviewed and updated as needed by me. EMR Dragon/transcription disclaimer: Much of this documentt is electronic  transcription/translation of spoken language to printed text. The  electronic translation of spoken language may be erroneous, or at times,  nonsensical words or phrases may be inadvertently transcribed.  Although I  have reviewed the document for such errors, some may still exist.

## 2021-12-15 DIAGNOSIS — C61 PROSTATE CANCER (HCC): ICD-10-CM

## 2021-12-15 LAB — PROSTATE SPECIFIC ANTIGEN: <0.01 NG/ML (ref 0–4)

## 2021-12-27 ENCOUNTER — OFFICE VISIT (OUTPATIENT)
Dept: UROLOGY | Age: 76
End: 2021-12-27
Payer: MEDICARE

## 2021-12-27 VITALS — HEIGHT: 73 IN | WEIGHT: 234.6 LBS | BODY MASS INDEX: 31.09 KG/M2 | TEMPERATURE: 97.2 F

## 2021-12-27 DIAGNOSIS — Z85.51 HISTORY OF BLADDER CANCER: ICD-10-CM

## 2021-12-27 DIAGNOSIS — R35.0 FREQUENCY OF URINATION: ICD-10-CM

## 2021-12-27 DIAGNOSIS — C61 PROSTATE CANCER (HCC): Primary | ICD-10-CM

## 2021-12-27 LAB
BACTERIA URINE, POC: 0
BILIRUBIN URINE: 0 MG/DL
BLOOD, URINE: NEGATIVE
CASTS URINE, POC: 0
CLARITY: CLEAR
COLOR: YELLOW
CRYSTALS URINE, POC: NORMAL
EPI CELLS URINE, POC: + 1
GLUCOSE URINE: 0
KETONES, URINE: NEGATIVE
LEUKOCYTE EST, POC: NORMAL
NITRITE, URINE: NEGATIVE
PH UA: 5.5 (ref 4.5–8)
PROTEIN UA: NEGATIVE
RBC URINE, POC: 0
SPECIFIC GRAVITY UA: 1.02 (ref 1–1.03)
UROBILINOGEN, URINE: NORMAL
WBC URINE, POC: 10
YEAST URINE, POC: 0

## 2021-12-27 PROCEDURE — G8427 DOCREV CUR MEDS BY ELIG CLIN: HCPCS | Performed by: UROLOGY

## 2021-12-27 PROCEDURE — G8484 FLU IMMUNIZE NO ADMIN: HCPCS | Performed by: UROLOGY

## 2021-12-27 PROCEDURE — 99214 OFFICE O/P EST MOD 30 MIN: CPT | Performed by: UROLOGY

## 2021-12-27 PROCEDURE — 1123F ACP DISCUSS/DSCN MKR DOCD: CPT | Performed by: UROLOGY

## 2021-12-27 PROCEDURE — 4040F PNEUMOC VAC/ADMIN/RCVD: CPT | Performed by: UROLOGY

## 2021-12-27 PROCEDURE — 81000 URINALYSIS NONAUTO W/SCOPE: CPT | Performed by: UROLOGY

## 2021-12-27 PROCEDURE — G8417 CALC BMI ABV UP PARAM F/U: HCPCS | Performed by: UROLOGY

## 2021-12-27 PROCEDURE — 1036F TOBACCO NON-USER: CPT | Performed by: UROLOGY

## 2021-12-27 PROCEDURE — 3017F COLORECTAL CA SCREEN DOC REV: CPT | Performed by: UROLOGY

## 2021-12-27 RX ORDER — MEGESTROL ACETATE 20 MG/1
20 TABLET ORAL DAILY
Qty: 30 TABLET | Refills: 11 | Status: SHIPPED | OUTPATIENT
Start: 2021-12-27 | End: 2022-06-24

## 2021-12-27 ASSESSMENT — ENCOUNTER SYMPTOMS
GASTROINTESTINAL NEGATIVE: 1
EYES NEGATIVE: 1
RESPIRATORY NEGATIVE: 1

## 2021-12-27 NOTE — PROGRESS NOTES
Kris Galicia is a 77 y.o. male who presents today   Chief Complaint   Patient presents with    Follow-up     Prostate cancer      Prostate Cancer  Patient is here today for prostate cancer which was first diagnosed approximately 2 years ago. His prostate cancer can be characterized as clinical stage T2b [facility] 3+4 = 7 grade group 2. PSA was 5.5 diagnosis now undetectable  His last several PSA values are as follows:  Lab Results   Component Value Date    PSA <0.01 12/15/2021    PSA <0.01 06/17/2021    PSA <0.01 03/09/2021     Previous treatment of prostate cancer: External beam radiation therapy completed 9/10/2020 and 6 months of neoadjuvant hormonal therapy  He does complain of some hot flashes which he takes Megace for  Lower urinary tract symptoms: urgency and frequency this is improved on Myrbetriq. He cannot take anticholinergics due to glaucoma. BLADDER CANCER  Patient was first diagnosed with bladder cancer approximately 8 month(s) ago. Last Recurrence: He has not had a recurrence initial diagnosis was on 4/16/2021  Stage of bladder cancer at last recurrence: 8130/2 - Papillary transitional cell carcinoma, non-invasive, stage TA  Grade: low grade  Last urinary cytology/FISH results: negative; Date: 10/4/2021  Hematuria? Negative  Last upper tract study: 8 month(s) ago.   Study was a bilateral retrograde pyelograms done 4/16/2021  He has surveillance cystoscopy done on 10/18/2021 he is due in 1 month for his next cystoscopy    Past Medical History:   Diagnosis Date    Diabetes mellitus ([address]     Elevated PSA     Hypertension     Kidney stone     Lymphoma ([address]     Prostate cancer [address] [address]        Past Surgical History:   Procedure Laterality Date    APPENDECTOMY      CYSTOSCOPY N/A 4/15/2021    CYSTOSCOPY, BILATERAL URETERAL CATHETERIZATIONS WITH RETROGRADES, URETHRAL DILATATION, CLOT EVACUATION AND FULGURATION OF BLEEDING, TRANSURETHRAL RESECTION OF BLADDER TUMOR performed by Cynthia Worley Benedicto Bai MD at [address]  2014    LAPAROTOMY      Resection of retroperitoneal lymphadenopathy    SHOULDER SURGERY  2016    VASECTOMY  1979       Current Outpatient Medications   Medication Sig Dispense Refill    megestrol (MEGACE) 20 MG tablet Take 1 tablet by mouth daily 30 tablet 11    mirabegron (MYRBETRIQ) 25 MG TB24 Take 1 tablet by mouth daily 30 tablet 11    rivaroxaban (XARELTO) 20 MG TABS tablet Take 1 tablet by mouth daily 30 tablet 0    furosemide (LASIX) 20 MG tablet Take 20 mg by mouth daily      magnesium 30 MG tablet Take 30 mg by mouth 2 times daily      benzonatate (TESSALON) 100 MG capsule Take 100 mg by mouth 3 times daily as needed for Cough       TRUE METRIX BLOOD GLUCOSE TEST strip USE 1 STRIP TO CHECK GLUCOSE TWICE DAILY      Travoprost, BAK Free, (TRAVATAN Z) 0.004 % SOLN ophthalmic solution Travatan Z 0.004 % eye drops   INSTILL 1 DROP INTO EACH EYE AT BEDTIME      zoster recombinant adjuvanted vaccine (SHINGRIX) 50 MCG/0.5ML SUSR injection Shingrix (PF) 50 mcg/0.5 mL intramuscular suspension, kit      pantoprazole (PROTONIX) 40 MG tablet TAKE 1 TABLET BY MOUTH ONCE DAILY      verapamil (VERELAN) 240 MG extended release capsule daily       glipiZIDE (GLUCOTROL) 5 MG tablet Take 5 mg by mouth daily       meclizine (ANTIVERT) 25 MG tablet Take 25 mg by mouth 3 times daily as needed       SITagliptin (JANUVIA) 100 MG tablet Take 100 mg by mouth daily       sertraline (ZOLOFT) 100 MG tablet daily       lisinopril (PRINIVIL;ZESTRIL) 5 MG tablet daily       cetirizine (ZYRTEC ALLERGY) 10 MG tablet Zyrtec 10 mg tablet   Take 1 tablet every day by oral route.  rizatriptan (MAXALT) 10 MG tablet as needed        No current facility-administered medications for this visit.        Allergies   Allergen Reactions    Ticlopidine Hives and Swelling           Flagyl [Metronidazole]      weakness       Social History     Socioeconomic History    Marital status:      Spouse name: Not on file    Number of children: Not on file    Years of education: Not on file    Highest education level: Not on file   Occupational History    Not on file   Tobacco Use    Smoking status: Never Smoker    Smokeless tobacco: Never Used   Vaping Use    Vaping Use: Never used   Substance and Sexual Activity    Alcohol use: Not on file    Drug use: Not on file    Sexual activity: Not on file   Other Topics Concern    Not on file   Social History Narrative    Not on file     Social Determinants of Health     Financial Resource Strain:     Difficulty of Paying Living Expenses: Not on file   Food Insecurity:     Worried About Running Out of Food in the Last Year: Not on file    Leandra of Food in the Last Year: Not on file   Transportation Needs:     Lack of Transportation (Medical): Not on file    Lack of Transportation (Non-Medical):  Not on file   Physical Activity:     Days of Exercise per Week: Not on file    Minutes of Exercise per Session: Not on file   Stress:     Feeling of Stress : Not on file   Social Connections:     Frequency of Communication with Friends and Family: Not on file    Frequency of Social Gatherings with Friends and Family: Not on file    Attends Denominational Services: Not on file    Active Member of [address] [place] [address] [employer] or Organizations: Not on file    Attends Club or Organization Meetings: Not on file    Marital Status: Not on file   Intimate Partner Violence:     Fear of Current or Ex-Partner: Not on file    Emotionally Abused: Not on file    Physically Abused: Not on file    Sexually Abused: Not on file   Housing Stability:     Unable to Pay for Housing in the Last Year: Not on file    Number of [place] in the Last Year: Not on file    Unstable Housing in the Last Year: Not on file       Family History   Problem Relation Age of Onset    Heart Attack Father 72        cause of death    Kidney Disease Mother    [facility] Other Mother         renal failure  Cancer Sister [de-identified]        Colon CA- cause of death     Heart Attack Sister     Colon Cancer Sister 68       REVIEW OF SYSTEMS:  Review of Systems   Constitutional: Negative. Negative for chills, fatigue and fever. HENT: Negative. Eyes: Negative. Respiratory: Negative. Cardiovascular: Negative. Gastrointestinal: Negative. Endocrine:        Hot flashes   Genitourinary: Positive for frequency, hematuria and urgency. Negative for dysuria and flank pain. Musculoskeletal: Negative. Neurological: Negative. All other systems reviewed and are negative. PHYSICAL EXAM:  Temp 97.2 °F (36.2 °C)   Ht 6' 1\" (1.854 m)   Wt 234 lb 9.6 oz (106.4 kg)   BMI 30.95 kg/m²   Physical Exam  Constitutional:       General: He is not in acute distress. Appearance: Normal appearance. He is well-developed. HENT:      Head: Normocephalic and atraumatic. Nose: Nose normal.   Eyes:      General: No scleral icterus. Conjunctiva/sclera: Conjunctivae normal.      Pupils: Pupils are equal, round, and reactive to light. Neck:      Trachea: No tracheal deviation. Cardiovascular:      Rate and Rhythm: Normal rate and regular rhythm. Pulmonary:      Effort: Pulmonary effort is normal. No respiratory distress. Breath sounds: No stridor. Abdominal:      General: There is no distension. Palpations: Abdomen is soft. There is no mass. Tenderness: There is no abdominal tenderness. Musculoskeletal:         General: No tenderness. Normal range of motion. Cervical back: Normal range of motion and neck supple. Lymphadenopathy:      Cervical: No cervical adenopathy. Skin:     General: Skin is warm and dry. Findings: No erythema. Neurological:      Mental Status: He is alert and oriented to person, place, and time.    Psychiatric:         Behavior: Behavior normal.         Judgment: Judgment normal.             DATA:  CMP:    Lab Results   Component Value Date     04/20/2021    K 3.9 04/20/2021     04/20/2021    CO2 25 04/20/2021    BUN 18 04/20/2021    CREATININE 1.0 04/20/2021    GFRAA >59 04/20/2021    AGRATIO 1.9 01/11/2021    LABGLOM >60 04/20/2021    GLUCOSE 158 04/20/2021    PROT 7.5 04/20/2021    LABALBU 4.0 04/20/2021    CALCIUM 9.5 04/20/2021    BILITOT 0.4 04/20/2021    ALKPHOS 72 04/20/2021    AST 22 04/20/2021    ALT 22 04/20/2021     Results for orders placed or performed in visit on 12/27/21   POC Urine with Microscopic   Result Value Ref Range    Color, UA Yellow     Clarity, UA Clear Clear    Glucose, Ur 0     Bilirubin Urine 0 mg/dL    Ketones, Urine Negative     Specific Gravity, UA 1.025 1.005 - 1.030    Blood, Urine Negative     pH, UA 5.5 4.5 - 8.0    Protein, UA Negative Negative    Nitrite, Urine Negative     Leukocytes, UA SMALL     Urobilinogen, Urine Normal     rbc urine, poc 0     wbc urine, poc 10     bacteria urine, poc 0     yeast urine, poc 0     casts urine, poc 0     Epi Cells Urine, POC + 1     crystals urine, poc NEG      Lab Results   Component Value Date    PSA <0.01 12/15/2021    PSA <0.01 06/17/2021    PSA <0.01 03/09/2021         1. Prostate cancer [facility])  PSA remains undetectable continue to monitor he will be due his next PSA June 2022  Renew megace for hot flashes  - POC Urine with Microscopic    2. History of bladder cancer  He is due his next surveillance cystoscopy in 1 month. - Cytology, Non-Gyn; Future  - Cystoscopy; Future    3. Frequency of urination  He has improvement on Myrbetriq he will continue this.   He did ask about weaning off medication I told him that is totally his choice that he like to try stopping it we did give him some samples today      Orders Placed This Encounter   Procedures    Cytology, Non-Gyn     Prior to next visit in 1 month     Standing Status:   Future     Standing Expiration Date:   12/27/2022     Order Specific Question:   PREVIOUS BIOPSY     Answer:   Yes     Order Specific Question: PREOP DIAGNOSIS     Answer:   History of bladder cancer     Order Specific Question:   FROZEN SECTION - NO OR YES/SPECIMEN     Answer:   No    POC Urine with Microscopic    Cystoscopy     Next available     Standing Status:   Future     Standing Expiration Date:   12/27/2022     Scheduling Instructions:      Next available in 1 month        Return in about 1 month (around 1/27/2022) for Cystoscopy on next visit, Urine Cytology prior to next visit. All information inputted into the note by the MA to include chief complaint, past medical history, past surgical history, medications, allergies, social and family history and review of systems has been reviewed and updated as needed by me. EMR Dragon/transcription disclaimer: Much of this documentt is electronic  transcription/translation of spoken language to printed text. The  electronic translation of spoken language may be erroneous, or at times,  nonsensical words or phrases may be inadvertently transcribed.  Although I  have reviewed the document for such errors, some may still exist.

## 2022-01-14 DIAGNOSIS — Z85.51 HISTORY OF BLADDER CANCER: ICD-10-CM

## 2022-01-31 ENCOUNTER — PROCEDURE VISIT (OUTPATIENT)
Dept: UROLOGY | Age: 77
End: 2022-01-31
Payer: MEDICARE

## 2022-01-31 VITALS — BODY MASS INDEX: 31.14 KG/M2 | HEIGHT: 73 IN | WEIGHT: 235 LBS

## 2022-01-31 DIAGNOSIS — C61 PROSTATE CANCER (HCC): Primary | ICD-10-CM

## 2022-01-31 DIAGNOSIS — Z85.51 HISTORY OF BLADDER CANCER: ICD-10-CM

## 2022-01-31 PROCEDURE — 99999 PR OFFICE/OUTPT VISIT,PROCEDURE ONLY: CPT | Performed by: UROLOGY

## 2022-01-31 PROCEDURE — 81002 URINALYSIS NONAUTO W/O SCOPE: CPT | Performed by: UROLOGY

## 2022-01-31 PROCEDURE — 52000 CYSTOURETHROSCOPY: CPT | Performed by: UROLOGY

## 2022-01-31 NOTE — PROGRESS NOTES
BLADDER CANCER  Patient was first diagnosed with bladder cancer approximately 10 month(s) ago. Last Recurrence: He is not had a recurrence initial diagnosis was 4/16/2021  Stage of bladder cancer at last recurrence: 8130/2 - Papillary transitional cell carcinoma, non-invasive, stage TA  Grade: low grade  Last urinary cytology/FISH results: negative; Date: 1/14/2010  Hematuria? None  Last upper tract study: 10 month(s) ago. Study was a bilateral retrograde pyelograms done on 4/16/2021      Prostate Cancer  Patient is here today for prostate cancer which was first diagnosed approximately 2 years ago. His prostate cancer can be characterized as clinical stage T2b [place] 3+4 = 7 grade group 2. PSA was 5.5 diagnosis now undetectable  His last several PSA values are as follows:        Lab Results   Component Value Date     PSA <0.01 12/15/2021     PSA <0.01 06/17/2021     PSA <0.01 03/09/2021      Previous treatment of prostate cancer: External beam radiation therapy completed 9/10/2020 and 6 months of neoadjuvant hormonal therapy  He does complain of some hot flashes which he takes Megace for  Lower urinary tract symptoms: urgency and frequency this is improved on Myrbetriq. He cannot take anticholinergics due to glaucoma.     He is due his next PSA June 2022    Cystoscopy Operative Note (1/31/22)  Surgeon: Suzi Edgar MD  Anesthesia: Urethral 2% Xylocaine   Indications: History bladder cancer  Position: Supine    Findings:   The patient was prepped and draped in the usual sterile fashion. The flexible cystoscope was advanced through the urethra and into the bladder under direct vision.   The bladder was thoroughly inspected and the following was noted:    Residual Urine: mild  Urethra: normal appearing urethra with no masses, tenderness or lesions, stenotic at the bulbar urethra but the flexible scope easily passes through this narrowing is flimsy  Prostate: completely obstructing lateral lobes of prostate; moderate. Median lobe present? no.    Bladder: No tumors or CIS noted. No bladder diverticulum. There was mild trabeculation noted. No recurrent papillary tumor seen today. Ureters: Clear efflux from both ureters. Orifices with normal configuration and location. The cystoscope was removed. The patient tolerated the procedure well. The patient was given a post procedure instructions and follow up. Results for orders placed or performed in visit on 01/31/22   POCT Urinalysis no Micro   Result Value Ref Range    Color, UA yellow     Clarity, UA clear     Glucose, UA POC neg     Bilirubin, UA neg     Ketones, UA neg     Spec Grav, UA 1.030     Blood, UA POC neg     pH, UA 5.5     Protein, UA POC neg     Urobilinogen, UA 0.2     Leukocytes, UA neg     Nitrite, UA neg     Appearance, Fluid Clear Clear, Slightly Cloudy           1. Prostate cancer [facility])  He will be due his next PSA in June 2022  - POCT Urinalysis no Micro    2. History of bladder cancer  Continue follow-up for surveillance cystoscopy  - Cystoscopy  - Cytology, Non-Gyn; Future  - Cystoscopy; Future      Orders Placed This Encounter   Procedures    Cytology, Non-Gyn     Prior to next visit in 3 mos     Standing Status:   Future     Standing Expiration Date:   1/31/2023     Order Specific Question:   PREVIOUS BIOPSY     Answer:   Yes     Order Specific Question:   PREOP DIAGNOSIS     Answer:   History of bladder cancer     Order Specific Question:   FROZEN SECTION - NO OR YES/SPECIMEN     Answer:   No    POCT Urinalysis no Micro    Cystoscopy     Cystoscopy in 3 months     Standing Status:   Future     Standing Expiration Date:   1/31/2023       Return in about 3 months (around 4/30/2022) for Cystoscopy on next visit, Urine Cytology prior to next visit.         Marina Sung MD

## 2022-04-04 ENCOUNTER — HOSPITAL ENCOUNTER (OUTPATIENT)
Dept: CT IMAGING | Age: 77
Discharge: HOME | End: 2022-04-04
Payer: MEDICARE

## 2022-04-04 DIAGNOSIS — C82.03 FOLLICULAR LYMPHOMA GRADE I OF INTRA-ABDOMINAL LYMPH NODES (HCC): ICD-10-CM

## 2022-04-04 DIAGNOSIS — C61 PROSTATE CANCER (HCC): ICD-10-CM

## 2022-04-04 DIAGNOSIS — C67.2 MALIGNANT NEOPLASM OF LATERAL WALL OF URINARY BLADDER (HCC): ICD-10-CM

## 2022-04-04 LAB
GFR AFRICAN AMERICAN: >60
GFR NON-AFRICAN AMERICAN: 59
POC CREATININE: 1.2 MG/DL (ref 0.3–1.3)

## 2022-04-04 PROCEDURE — 74177 CT ABD & PELVIS W/CONTRAST: CPT

## 2022-04-04 PROCEDURE — 71260 CT THORAX DX C+: CPT

## 2022-04-04 PROCEDURE — 82565 ASSAY OF CREATININE: CPT

## 2022-04-04 PROCEDURE — 6360000004 HC RX CONTRAST MEDICATION: Performed by: INTERNAL MEDICINE

## 2022-04-04 RX ADMIN — IOPAMIDOL 75 ML: 755 INJECTION, SOLUTION INTRAVENOUS at 10:13

## 2022-04-06 NOTE — PROGRESS NOTES
MEDICAL ONCOLOGY PROGRESS NOTE                                                          Harmony Gomes   [birth year] 4/11/2022     Chief Complaint   Patient presents with    Follow-up     Prostate cancer ([address]        INTERVAL HISTORY/HISTORY OF PRESENT ILLNESS:  Harmony Gomes presents today for a surveillance follow up visit. He has a diagnosis of prostate cancer and also follicular lymphoma stage Ia. In addition, he has been diagnosed with stage 0 bladder cancer in April 2021 and is status post transurethral resection of bladder tumor by urology [facility].   This is a telehealth visit. The patient lives in [place], [place]. He had CT scans performed. He is status post RT for his prostate cancer and also stage Ia follicular lymphoma. Denies any new complaints today. Denies any hematuria. Denies any B symptoms. Last PSA <0.01 on 12/15/2021. He had a cystoscopy performed on 1/31/2022 that showed no evidence of tumor or CIS. He had a repeat CT chest abdomen pelvis for surveillance. Diagnosis  · Prostate cancer, February 2020  · [id number]  · [place] 3+4=7  · 4 of 12 cores positive (left prostatic lobe)   · PSA @ diagnosis- 5.5  · Follicular lymphoma grade 1-2 (retroperitoneal adenopathy), March 2020. Stage I A  · Noninvasive low-grade papillary urothelial carcinoma, April 2021     Treatment summary   · 03/24/2020- Retroperitoneal ressection of lymphoma at [facility]  · 7/6/20-7/31/20-IFRT- 27 Gy in 18 fractions to left retroperitoneal lymph node-[facility]/Dr. Parker Copeland  · 6/24/20-8/21/20- 78 Gy in 42 fractions for prostate cancer -[facility]/Dr. Parker Copeland  · 4/15/21 Bladder cancer-TURBT by Dr. Jacquelyn Joyce  Cancer History:  Mr. Lupillo Crawford was first seen by me on 2/21/2020 referred by Dr. Marianne Delgado for further evaluation and recommendations regarding findings of retroperitoneal mass/adenopathy.   The patient has recently been diagnosed with prostate cancer with 4 out of 12 positive cores from the left prostatic lobe. PSA at diagnosis 5.5. Pathology showed prostatic adenocarcinoma grade group 2 in the [place] score 3+4 = 7. Clinical T2b. Imaging studies with CT chest abdomen pelvis showed evidence of retroperitoneal mass. · 1/29/2020- CT of the abdomen with contrast showed scattered borderline sized mesenteric lymph nodes. Indeterminate 3 cm retroperitoneal soft tissue nodule within the upper left abdomen adjacent to the left renal vein. The mass measures 34 x 30 x 22 mm. · 1/29/2020- bone scan showed focal increased activity in the mid thoracic spine in the right seventh posterior medial rib which may represent chronic degenerative changes/large osteophytes. No other areas suggesting bony metastatic disease. · 2/13/2020- MRI thoracic spine showed no evidence of metastatic disease identified in the thoracic spine. In particular, there are no enhancing and/or infiltrative lesions identified in the thoracic spine or posterior ribs to correspond with the radiotracer activities from recent bone scan. It is possible that the bone scan findings are secondary to vertebral\" the vertebral degenerative change. · 2/13/2020- CT chest showed no evidence of metastatic disease in the chest.  Nodule in the retroperitoneum of the upper abdomen presumably a lymph node. Additional borderline lymph nodes seen within the central mesenteric root. · 2/21/2020- we discussed the findings of the images above. Recommend a PET scan to rule out metastatic disease. · 03/04/200- Pet Ct- Isolated neoplastic focus within the upper abdomen left-sided retroperitoneum SUV 11. Neoplastic lymphadenopathy is favored. This is not in a location which can be sampled by percutaneous biopsy. Otherwise no bone lesion or chest abnormality is seen.  3. Other mildly prominent mesenteric lymphadenopathy shows low level FDG uptake and reactive nodes are favored over neoplastic mesenteric nodes. · 3/24/2020- retroperitoneal mass resection3 cm mass off of the overlying the left renal vein at [facility] by Dr. Natalia June, surgical oncology. Path compatible with follicular lymphoma, grade 1-2 out of 3. CD20-positive B cells that are positive for PAX-5, CD10, BCL6, and BCL-2 and negative for Cyclin D1. A CD21 stain highlights follicular dendritic meshworks associated with the B cell nodules. The Ki-67 proliferative index is approximately 10% within the nodules. CD3 highlights background T cells in interfollicular areas. · 4/13/2020- recommend bone marrow to complete staging for his lymphoma. Recommended to follow-up with urology for discussion of treatment for his prostate cancer. If bone marrow shows no evidence of lymphoma involvement then will recommend involved field radiation therapy to the retroperitoneal lymph nodes. · 4/13/2020-bone marrow biopsy/aspirate was unremarkable. No evidence of lymphomatous involvement. · 4/24/2020- I discussed with the patient the findings of bone marrow PET scan. Therefore, stage I A. Recommended involved field RT to the retroperitoneal nodes. · 7/6/20-7/31/20 Radiation therapy-1.5cGy to left retroperitoneal lymph node-[facility]/Dr. Gurpreet Walker  · 6/24/20-8/21/20- 78 cGy in 42 fractions for prostate cancer -[facility]/Dr. Gurpreet Walker  · 12/09/2020- PSA <0.01  · 01/11/2021- Ct Chest W Contrast Stable chest CT. 2. No new or acute abnormality. · 01/11/2021-Ct Abdomen Pelvis W Contrast The resolution of the previously seen enlarged left retroperitoneal lymph node. The persistent mild to moderate mesenteric lymphadenopathy. The persistent mesenteric thickening in the upper to mid abdomen. · 4/15/21 Bladder cancer-TURBT by Dr. Jason Yan: Urinary bladder, transurethral resection of left lateral wall bladder tumor: Noninvasive low-grade papillary urothelial carcinoma.  Muscularis propria is identified, negative for evidence of malignancy. AJCC STAGE: pTa, pNx   · 4/20/21 CT abdomen/pelvis: Valle catheter within the bladder with diffuse bladder wall thickening and mild perivesicular fat stranding. Correlation for cystitis recommended. Radiation seeds prostate. No loculated pelvic fluid collection. Hepatic steatosis. Chronic findings of the spleen described above. Avascular necrosis of the femoral heads. · 6/17/21 PSA <0.01  · 6/30/21 Urine, ThinPrep preparation (1): Benign squamous epithelial cells and benign urothelial cells. Rare atypical urothelial cells identified, nondiagnostic of malignancy. Scattered neutrophils. · 10/4/21-CT chest/abdomen/pelvis showed no evidence of chest, abdominal or pelvic lymphadenopathy. Persistent mesenteric thickening which may represent retractile mesenteritis/mesenteric panniculitis. A persistent mild mesenteric adenopathy. The diverticulosis of the colon. No finding to suggest diverticulitis. A stable low-density nodule in the spleen. A Small paraumbilical hernia containing a loop of transverse colon and small bowel. No obstruction. · 10/11/2021-essentially, CT chest abdomen pelvis showed no evidence of lymphoma recurrence. Continue clinical/radiologic surveillance  · 1/14/2022 Urine, ThinPrep: Benign squamous and urothelial cells. · 1/31/2022-cystoscopy by Dr. Amelie Pete showed no evidence of tumor or CIS. · 4/4/2022 CT Chest W Contrast No thoracic lymphadenopathy. New 3 mm RIGHT upper lobe pulmonary nodule. This is likely represents an infectious or inflammatory process but a small metastatic lesion in also within the differential.Recommend special attention on follow-up imaging to confirm stability or resolution. · 4/4/2022 CT Abd/Pelvis W IV Contrast (Oral) No lymphadenopathy in the abdomen or pelvis. No change in diffuse fat stranding throughout the mesentery,likely resenting meseneritis.     PAST MEDICAL HISTORY:   Past Medical History:   Diagnosis Date    Diabetes mellitus ([facility] [address]     Elevated PSA     Hypertension     Kidney stone     Lymphoma ([facility] [address] [facility]     Prostate cancer ([facility] [address]           PAST SURGICAL HISTORY:  Past Surgical History:   Procedure Laterality Date    APPENDECTOMY      CYSTOSCOPY N/A 4/15/2021    CYSTOSCOPY, BILATERAL URETERAL CATHETERIZATIONS WITH RETROGRADES, URETHRAL DILATATION, CLOT EVACUATION AND FULGURATION OF BLEEDING, TRANSURETHRAL RESECTION OF BLADDER TUMOR performed by Juvenal Smith MD at [address]  2014    LAPAROTOMY      Resection of retroperitoneal lymphadenopathy    SHOULDER SURGERY  2016    VASECTOMY  1979        SOCIAL HISTORY:  Social History     Socioeconomic History    Marital status:      Spouse name: Not on file    Number of children: Not on file    Years of education: Not on file    Highest education level: Not on file   Occupational History    Not on file   Tobacco Use    Smoking status: Never Smoker    Smokeless tobacco: Never Used   Vaping Use    Vaping Use: Never used   Substance and Sexual Activity    Alcohol use: Not on file    Drug use: Not on file    Sexual activity: Not on file   Other Topics Concern    Not on file   Social History Narrative    Not on file     Social Determinants of Health     Financial Resource Strain:     Difficulty of Paying Living Expenses: Not on file   Food Insecurity:     Worried About Running Out of Food in the Last Year: Not on file    Leandra of Food in the Last Year: Not on file   Transportation Needs:     Lack of Transportation (Medical): Not on file    Lack of Transportation (Non-Medical):  Not on file   Physical Activity:     Days of Exercise per Week: Not on file    Minutes of Exercise per Session: Not on file   Stress:     Feeling of Stress : Not on file   Social Connections:     Frequency of Communication with Friends and Family: Not on file    Frequency of Social Gatherings with Friends and Family: Not on file    Attends Confucianism Services: Not on file    Active Member of Clubs or Organizations: Not on file    Attends Club or Organization Meetings: Not on file    Marital Status: Not on file   Intimate Partner Violence:     Fear of Current or Ex-Partner: Not on file    Emotionally Abused: Not on file    Physically Abused: Not on file    Sexually Abused: Not on file   Housing Stability:     Unable to Pay for Housing in the Last Year: Not on file    Number of Places Lived in the Last Year: Not on file    Unstable Housing in the Last Year: Not on file       FAMILY HISTORY:  Family History   Problem Relation Age of Onset    Heart Attack Father 72        cause of death    Kidney Disease Mother     Other Mother         renal failure    Cancer Sister [de-identified]        Colon CA- cause of death     Heart Attack Sister     Colon Cancer Sister 68        Current Outpatient Medications   Medication Sig Dispense Refill    megestrol (MEGACE) 20 MG tablet Take 1 tablet by mouth daily 30 tablet 11    mirabegron (MYRBETRIQ) 25 MG TB24 Take 1 tablet by mouth daily 30 tablet 11    rivaroxaban (XARELTO) 20 MG TABS tablet Take 1 tablet by mouth daily 30 tablet 0    furosemide (LASIX) 20 MG tablet Take 20 mg by mouth daily      magnesium 30 MG tablet Take 30 mg by mouth 2 times daily      benzonatate (TESSALON) 100 MG capsule Take 100 mg by mouth 3 times daily as needed for Cough       TRUE METRIX BLOOD GLUCOSE TEST strip USE 1 STRIP TO CHECK GLUCOSE TWICE DAILY      Travoprost, BAK Free, (TRAVATAN Z) 0.004 % SOLN ophthalmic solution Travatan Z 0.004 % eye drops   INSTILL 1 DROP INTO EACH EYE AT BEDTIME      zoster recombinant adjuvanted vaccine (SHINGRIX) 50 MCG/0.5ML SUSR injection Shingrix (PF) 50 mcg/0.5 mL intramuscular suspension, kit      pantoprazole (PROTONIX) 40 MG tablet TAKE 1 TABLET BY MOUTH ONCE DAILY      verapamil (VERELAN) 240 MG extended release capsule daily       glipiZIDE (GLUCOTROL) 5 MG tablet Take 5 mg by mouth daily  meclizine (ANTIVERT) 25 MG tablet Take 25 mg by mouth 3 times daily as needed       SITagliptin (JANUVIA) 100 MG tablet Take 100 mg by mouth daily       sertraline (ZOLOFT) 100 MG tablet daily       lisinopril (PRINIVIL;ZESTRIL) 5 MG tablet daily       cetirizine (ZYRTEC ALLERGY) 10 MG tablet Zyrtec 10 mg tablet   Take 1 tablet every day by oral route.  rizatriptan (MAXALT) 10 MG tablet as needed        No current facility-administered medications for this visit. REVIEW OF SYSTEMS:   CONSTITUTIONAL: no fever, no night sweats,  fatigue;  HEENT: no blurring of vision, no double vision, no hearing difficulty, no tinnitus, no ulceration, no dysplasia, no epistaxis;   LUNGS: no cough, no hemoptysis, no wheeze,   shortness of breath;  CARDIOVASCULAR: no palpitation, no chest pain, shortness of breath;  GI: no abdominal pain, no nausea, no vomiting, no diarrhea, no constipation;  NIKHIL: no dysuria, no hematuria, no frequency or urgency, no nephrolithiasis;  MUSCULOSKELETAL: no joint pain, no swelling, no stiffness;  ENDOCRINE: no polyuria, no polydipsia, no cold or heat intolerance;  HEMATOLOGY: no easy bruising or bleeding, no history of clotting disorder;  DERMATOLOGY: no skin rash, no eczema, no pruritus;  PSYCHIATRY: no depression, no anxiety, no panic attacks, no suicidal ideation, no homicidal ideation;  NEUROLOGY: no syncope, no seizures, no numbness or tingling of hands, no numbness or tingling of feet, no paresis; There were no vitals taken for this visit. PHYSICAL EXAM:  Telehealth visit    Relevant Lab findings/reviewed by me:  Resulted 1/14/2022  Urine, ThinPrep: Benign squamous and urothelial cells. Relevant Imaging studies/reviewed by me:  4/4/2022 CT Chest W Contrast No thoracic lymphadenopathy. New 3 mm RIGHT upper lobe pulmonary nodule.  This is likely represents an infectious or inflammatory process but a small metastatic lesion in also within the differential.Recommend special attention on follow-up imaging to confirm stability or resolution. 4/4/2022 CT Abd/Pelvis W IV Contrast (Oral) No lymphadenopathy in the abdomen or pelvis. No change in diffuse fat stranding throughout the mesentery,likely resenting meseneritis. ASSESSMENT:    No orders of the defined types were placed in this encounter. [facility] was [facility] today for follow-up. Diagnoses and all orders for this visit:    Prostate cancer ([facility] [address]    Follicular lymphoma grade I of intra-abdominal lymph nodes [facility])    Care plan discussed with patient    Malignant neoplasm of lateral wall of urinary bladder ([facility] [address]      Prostate cancer [id number] status post RT  6/24/20-8/21/20- 78 cGy in 42 fractions for prostate cancer -[facility]/Dr. James Beavers  Last PSA <0.01 Dec 2021  CT chest abdomen pelvis January 2021-stable retroperitoneal adenopathy. CT chest abdomen pelvis October 2021-no evidence of lymphoma recurrence. No evidence of metastatic disease. CT chest abdomen pelvis April 2022-no evidence of metastatic disease, lymphoma recurrence. 3 mm right upper lobe nodule. Attention to follow-up.     Follicular lymphoma grade 1-2, Ki-67 10%, Stage I  Essentially, retroperitoneal adenopathy consistent with follicular lymphoma grade 1-2 with low Ki-67. The patient had a significant debulking surgery at [facility]. Bone marrow biopsy showed no evidence of lymphomatous involvement. PET scan showed no other evidence of metastatic disease. Therefore stage I disease. He received involved field RT as per NCCN guidelines completed July 2020. Recommended surveillance NCCN recommendation. CT chest abdomen pelvis January 2021-stable retroperitoneal adenopathy. CT chest abdomen pelvis October 2021-no evidence of lymphoma recurrence. No evidence of metastatic disease.    CT scans chest abdomen pelvis every 6 months x2 years through July 2022. -CT C/A/P-3 mm right upper lobe nodule (new).   Attention to follow-up in 6 months  -Repeat CT scans 6-month around October 2022      Hot flashes- off oxybutinin. Discuss with primary care Effexor    Stage 0 bladder cancer -continue cystoscopy surveillance with urology  1/31/2022-cystoscopy by Dr. Agus Jasmine showed no evidence of tumor or CIS. Cytology negative. -Next cystoscopy end of April      Sub-centimeter pulmonary nodule  -3 mm new right upper lobe nodule. Differential diagnosis include inflammatory/infectious. Cannot rule out metastatic disease but less likely. -Repeat CT chest 6-month    PLAN:  · Follow-up with Dr. Agus Jasmine for prostate cancer and bladder cancer surveillance  · CT scans in April 2022 then every 6 months for 2 years  · PSA to be done by Dr. Kendrick and Dr. Leslie Gillis recommendations  · Repeat CT chest/abdomen was performed over 2022      Follow Up:     No follow-ups on file. Data Ashlee Martinez am pre charting  as Medical Assistant for Nanette Preciado MD. Electronically signed by Antonella Horne MA on 4/11/2022 at 9:22 AM CDT. Jinny Fair am scribing as Medical Assistant for Nanette Preciado MD. Electronically signed by Antonella Horne MA on 4/11/2022 at 6:19 PM CDT. I, Dr Julio Garsia, personally performed the services described in this documentation as scribed by Antonella Horne MA in my presence and is both accurate and complete. I have seen, examined and reviewed this patient medication list, appropriate labs and imaging studies. I reviewed relevant medical records and others physicians notes. I discussed the plans of care with the patient. I answered all the questions to the patients satisfaction. I have also reviewed the chief complaint (CC) and part of the history (History of Present Illness (HPI), Past Family Social History [facility]), or Review of Systems (ROS) and made changes when appropriated.        (Please note that portions of this note were completed with a voice recognition program. Efforts were made to edit the dictations but occasionally words are mis-transcribed. )Electronically signed by Amy Cast MD on 4/11/2022 at 6:19 PM      Venessa Rojas is a 77 y.o. male evaluated via telephone on 4/11/2022 for Follow-up (Prostate cancer [facility]))  . Documentation:  I communicated with the patient and/or health care decision maker about as above. Details of this discussion including any medical advice provided: As above    Total Time: minutes: 11-20 minutes    Venessa Rojas was evaluated through a synchronous (real-time) audio encounter. Patient identification was verified at the start of the visit. He (or guardian if applicable) is aware that this is a billable service, which includes applicable co-pays. This visit was conducted with the patient's (and/or legal guardian's) verbal consent. He has not had a related appointment within my department in the past 7 days or scheduled within the next 24 hours. The patient was located in a state where the provider was licensed to provide care.     Note: not billable if this call serves to triage the patient into an appointment for the relevant concern    Amy Cast MD

## 2022-04-11 ENCOUNTER — TELEMEDICINE (OUTPATIENT)
Dept: HEMATOLOGY | Age: 77
End: 2022-04-11
Payer: MEDICARE

## 2022-04-11 DIAGNOSIS — C67.2 MALIGNANT NEOPLASM OF LATERAL WALL OF URINARY BLADDER (HCC): ICD-10-CM

## 2022-04-11 DIAGNOSIS — C82.03 FOLLICULAR LYMPHOMA GRADE I OF INTRA-ABDOMINAL LYMPH NODES (HCC): ICD-10-CM

## 2022-04-11 DIAGNOSIS — Z71.89 CARE PLAN DISCUSSED WITH PATIENT: ICD-10-CM

## 2022-04-11 DIAGNOSIS — C61 PROSTATE CANCER (HCC): Primary | ICD-10-CM

## 2022-04-11 PROCEDURE — 99442 PR PHYS/QHP TELEPHONE EVALUATION 11-20 MIN: CPT | Performed by: INTERNAL MEDICINE

## 2022-04-29 DIAGNOSIS — Z85.51 HISTORY OF BLADDER CANCER: ICD-10-CM

## 2022-05-12 ENCOUNTER — PROCEDURE VISIT (OUTPATIENT)
Dept: UROLOGY | Age: 77
End: 2022-05-12
Payer: MEDICARE

## 2022-05-12 VITALS — TEMPERATURE: 98.1 F | BODY MASS INDEX: 30.09 KG/M2 | HEIGHT: 73 IN | WEIGHT: 227 LBS

## 2022-05-12 DIAGNOSIS — C61 PROSTATE CANCER (HCC): ICD-10-CM

## 2022-05-12 DIAGNOSIS — Z85.51 HISTORY OF BLADDER CANCER: ICD-10-CM

## 2022-05-12 DIAGNOSIS — R35.0 FREQUENCY OF URINATION: Primary | ICD-10-CM

## 2022-05-12 LAB
BILIRUBIN, POC: NORMAL
BLOOD URINE, POC: NORMAL
CLARITY, POC: CLEAR
COLOR, POC: YELLOW
GLUCOSE URINE, POC: NORMAL
KETONES, POC: NORMAL
LEUKOCYTE EST, POC: NORMAL
NITRITE, POC: NORMAL
PH, POC: 5.5
PROTEIN, POC: NORMAL
SPECIFIC GRAVITY, POC: >=1.03
UROBILINOGEN, POC: 0.2

## 2022-05-12 PROCEDURE — 81003 URINALYSIS AUTO W/O SCOPE: CPT | Performed by: UROLOGY

## 2022-05-12 PROCEDURE — 52000 CYSTOURETHROSCOPY: CPT | Performed by: UROLOGY

## 2022-05-12 PROCEDURE — 51798 US URINE CAPACITY MEASURE: CPT | Performed by: UROLOGY

## 2022-05-12 RX ORDER — LISINOPRIL 2.5 MG/1
TABLET ORAL
COMMUNITY
Start: 2022-05-05 | End: 2022-06-24

## 2022-05-12 NOTE — PROGRESS NOTES
BLADDER CANCER  Patient was first diagnosed with bladder cancer approximately 13 month(s) ago. Last Recurrence: He has not had a recurrence initial diagnosis on 4/16/2021  Stage of bladder cancer at last recurrence: 8130/2 - Papillary transitional cell carcinoma, non-invasive, stage TA  Grade: low grade  Last urinary cytology/FISH results: negative; Date: 4/29/2022  Hematuria? Negative  Last upper tract study: 1 month ago  He had a CT of the abdomen pelvis done on 4/4/2022 shows normal kidneys bilaterally   a bilateral retrograde pyelograms done 4/16/2021      Prostate Cancer  Patient is here today for prostate cancer which was first diagnosed approximately 2 years ago. His prostate cancer can be characterized as clinical stage T2b [facility] 3+4 = 7 grade group 2.  PSA was 5.5 diagnosis now undetectable  His last several PSA values are as follows:            Lab Results   Component Value Date     PSA <0.01 12/15/2021     PSA <0.01 06/17/2021     PSA <0.01 03/09/2021      Previous treatment of prostate cancer: External beam radiation therapy completed 9/10/2020 and 6 months of neoadjuvant hormonal therapy  He does complain of some hot flashes which he takes Megace for  Lower urinary tract symptoms: urgency and frequency this is initially improved on Myrbetriq 25 mg but now states it is not working as good. Kyle Diop cannot take anticholinergics due to glaucoma.     He is due his next PSA June 2022        Cystoscopy Operative Note (5/12/22)  Surgeon: Juanpablo Macario MD  Anesthesia: Urethral 2% Xylocaine   Indications: History of bladder cancer  Position: Supine    Findings:   The patient was prepped and draped in the usual sterile fashion. The flexible cystoscope was advanced through the urethra and into the bladder under direct vision.   The bladder was thoroughly inspected and the following was noted:    Residual Urine: none  Urethra: normal appearing urethra with no masses, tenderness or lesions  Prostate: partially obstructing lateral lobes of prostate moderate; median lobe present? no.    Bladder: No tumors or CIS noted. No bladder diverticulum. There was moderate trabeculation noted. He has some dystrophic calcification just inside the bladder neck and the bladder neck and the left ureteral orifice was unable to push this off of the scope. He also had a bladder contraction during cystoscopy no recurrent bladder tumors were seen however  Ureters: Clear efflux from both ureters. Orifices with normal configuration and location. The cystoscope was removed. The patient tolerated the procedure well. The patient was given a post procedure instructions and follow up. Results for orders placed or performed in visit on 05/12/22   POCT Urinalysis No Micro (Auto)   Result Value Ref Range    Color, UA yellow     Clarity, UA clear     Glucose, UA POC -     Bilirubin, UA -     Ketones, UA -     Spec Grav, UA >=1.030     Blood, UA POC -     pH, UA 5.5     Protein, UA POC -     Urobilinogen, UA 0.2     Leukocytes, UA trace     Nitrite, UA -            1. History of bladder cancer  Cystoscopy shows no recurrence she does have some dystrophic calcification hopefully this will spontaneously pass. This may contribute to his bladder frequency  - Cystoscopy  - POCT Urinalysis No Micro (Auto)    2. Frequency of urination  This is primary complaint. Confounded by having bladder cancer and having prior radiation. We will try escalating the dose of Myrbetriq up to 50 mg  - NM Measure, post-void residual, US, non-imaging  - mirabegron (MYRBETRIQ) 50 MG TB24; Take 50 mg by mouth daily  Dispense: 30 tablet; Refill: 11    3. Prostate cancer [facility])  He will be due follow-up PSA next month. - PSA, Diagnostic;  Future      Orders Placed This Encounter   Procedures    PSA, Diagnostic     PSA prior to next visit     Standing Status:   Future     Standing Expiration Date:   5/12/2023    POCT Urinalysis No Micro (Auto)    NM Measure, post-void residual, US, non-imaging     pvr 0ml       Return in about 1 month (around 6/12/2022) for PSA prior to vext visit.         Laith Chester MD

## 2022-06-17 DIAGNOSIS — C61 PROSTATE CANCER (HCC): ICD-10-CM

## 2022-06-17 LAB — PROSTATE SPECIFIC ANTIGEN: <0.01 NG/ML (ref 0–4)

## 2022-06-24 ENCOUNTER — OFFICE VISIT (OUTPATIENT)
Dept: UROLOGY | Age: 77
End: 2022-06-24
Payer: MEDICARE

## 2022-06-24 VITALS
WEIGHT: 227.4 LBS | DIASTOLIC BLOOD PRESSURE: 72 MMHG | OXYGEN SATURATION: 92 % | SYSTOLIC BLOOD PRESSURE: 138 MMHG | HEIGHT: 73 IN | TEMPERATURE: 97.2 F | BODY MASS INDEX: 30.14 KG/M2 | HEART RATE: 74 BPM

## 2022-06-24 DIAGNOSIS — R35.0 FREQUENCY OF URINATION: Primary | ICD-10-CM

## 2022-06-24 DIAGNOSIS — Z85.51 HISTORY OF BLADDER CANCER: ICD-10-CM

## 2022-06-24 DIAGNOSIS — C61 PROSTATE CANCER (HCC): ICD-10-CM

## 2022-06-24 PROCEDURE — 1036F TOBACCO NON-USER: CPT | Performed by: UROLOGY

## 2022-06-24 PROCEDURE — G8417 CALC BMI ABV UP PARAM F/U: HCPCS | Performed by: UROLOGY

## 2022-06-24 PROCEDURE — 51798 US URINE CAPACITY MEASURE: CPT | Performed by: UROLOGY

## 2022-06-24 PROCEDURE — 99214 OFFICE O/P EST MOD 30 MIN: CPT | Performed by: UROLOGY

## 2022-06-24 PROCEDURE — 3017F COLORECTAL CA SCREEN DOC REV: CPT | Performed by: UROLOGY

## 2022-06-24 PROCEDURE — G8427 DOCREV CUR MEDS BY ELIG CLIN: HCPCS | Performed by: UROLOGY

## 2022-06-24 PROCEDURE — 81002 URINALYSIS NONAUTO W/O SCOPE: CPT | Performed by: UROLOGY

## 2022-06-24 PROCEDURE — 1123F ACP DISCUSS/DSCN MKR DOCD: CPT | Performed by: UROLOGY

## 2022-06-24 ASSESSMENT — ENCOUNTER SYMPTOMS
SORE THROAT: 0
VOMITING: 0
NAUSEA: 0
FACIAL SWELLING: 0
WHEEZING: 0
CHEST TIGHTNESS: 0
EYE REDNESS: 0
BACK PAIN: 0
EYE DISCHARGE: 0

## 2022-06-24 NOTE — PROGRESS NOTES
Jesús Duron is a 77 y.o. male who presents today   Chief Complaint   Patient presents with    Follow-up     I am here today for a 1 month FU. I had my PSA done prior. Prostate Cancer  Patient is here today for prostate cancer which was first diagnosed 2 years ago. His prostate cancer can be characterized as clinical stage T2b [facility] 3+4 = 7, grade group 2. His last several PSA values are as follows:  Lab Results   Component Value Date    PSA <0.01 06/17/2022    PSA <0.01 12/15/2021    PSA <0.01 06/17/2021    PSA <0.01 03/09/2021    PSA <0.01 12/09/2020     Previous treatment of prostate cancer: Neoadjuvant hormonal therapy x6 months last given April 27, 2020  External beam radiation therapy completed 9/10/2020. Lower urinary tract symptoms: nocturia, 2 times per night. His PVR today was 42 he also complains of feeling of incomplete emptying he takes Myrbetriq 50 mg which gives him improvement    Patient just seen for his surveillance cystoscopy for bladder cancer on 5/12/2022 please see the documentation below recurrent cancer was seen at that time he is due his next surveillance cystoscopy in August 2022      [facility]  Patient was first diagnosed with bladder cancer approximately 13 month(s) ago. Last Recurrence: He has not had a recurrence initial diagnosis on 4/16/2021  Stage of bladder cancer at last recurrence: 8130/2 - Papillary transitional cell carcinoma, non-invasive, stage TA  Grade: low grade  Last urinary cytology/FISH results: negative; Date: 4/29/2022  Hematuria?   Negative  Last upper tract study: 1 month ago  He had a CT of the abdomen pelvis done on 4/4/2022 shows normal kidneys bilaterally   a bilateral retrograde pyelograms done 4/16/2021       Past Medical History:   Diagnosis Date    [facility] [address]     Elevated PSA     Hypertension     Kidney stone     Lymphoma ([facility] [address]     Prostate cancer [facility])        Past Surgical History:   Procedure Laterality Date    APPENDECTOMY      CYSTOSCOPY N/A 4/15/2021    CYSTOSCOPY, BILATERAL URETERAL CATHETERIZATIONS WITH RETROGRADES, URETHRAL DILATATION, CLOT EVACUATION AND FULGURATION OF BLEEDING, TRANSURETHRAL RESECTION OF BLADDER TUMOR performed by Mone Molina MD at [facility]  2014    LAPAROTOMY      Resection of retroperitoneal lymphadenopathy    SHOULDER SURGERY  2016    VASECTOMY  1979       Current Outpatient Medications   Medication Sig Dispense Refill    mirabegron (MYRBETRIQ) 50 MG TB24 Take 50 mg by mouth daily 30 tablet 11    rivaroxaban (XARELTO) 20 MG TABS tablet Take 1 tablet by mouth daily 30 tablet 0    TRUE METRIX BLOOD GLUCOSE TEST strip USE 1 STRIP TO CHECK GLUCOSE TWICE DAILY      Travoprost, BAK Free, (TRAVATAN Z) 0.004 % SOLN ophthalmic solution Travatan Z 0.004 % eye drops   INSTILL 1 DROP INTO EACH EYE AT BEDTIME      pantoprazole (PROTONIX) 40 MG tablet TAKE 1 TABLET BY MOUTH ONCE DAILY      glipiZIDE (GLUCOTROL) 5 MG tablet Take 5 mg by mouth daily       meclizine (ANTIVERT) 25 MG tablet Take 25 mg by mouth 3 times daily as needed       SITagliptin (JANUVIA) 100 MG tablet Take 100 mg by mouth daily       sertraline (ZOLOFT) 100 MG tablet daily       cetirizine (ZYRTEC ALLERGY) 10 MG tablet Zyrtec 10 mg tablet   Take 1 tablet every day by oral route.  rizatriptan (MAXALT) 10 MG tablet as needed        No current facility-administered medications for this visit.        Allergies   Allergen Reactions    Ticlopidine Hives and Swelling           Flagyl [Metronidazole]      weakness       Social History     Socioeconomic History    Marital status:      Spouse name: None    Number of children: None    Years of education: None    Highest education level: None   Occupational History    None   Tobacco Use    Smoking status: Never Smoker    Smokeless tobacco: Never Used   Vaping Use    Vaping Use: Never used   Substance and Sexual Activity    Alcohol use: None    Drug use: None    Sexual activity: None   Other Topics Concern    None   Social History Narrative    None     Social Determinants of Health     Financial Resource Strain:     Difficulty of Paying Living Expenses: Not on file   Food Insecurity:     Worried About Running Out of Food in the Last Year: Not on file    Leandra of Food in the Last Year: Not on file   Transportation Needs:     Lack of Transportation (Medical): Not on file    Lack of Transportation (Non-Medical): Not on file   Physical Activity:     Days of Exercise per Week: Not on file    Minutes of Exercise per Session: Not on file   Stress:     Feeling of Stress : Not on file   Social Connections:     Frequency of Communication with Friends and Family: Not on file    Frequency of Social Gatherings with Friends and Family: Not on file    Attends Confucianist Services: Not on file    Active Member of [address] [place] [address] [employer] or Organizations: Not on file    Attends Club or Organization Meetings: Not on file    Marital Status: Not on file   Intimate Partner Violence:     Fear of Current or Ex-Partner: Not on file    Emotionally Abused: Not on file    Physically Abused: Not on file    Sexually Abused: Not on file   Housing Stability:     Unable to Pay for Housing in the Last Year: Not on file    Number of [place] in the Last Year: Not on file    Unstable Housing in the Last Year: Not on file       Family History   Problem Relation Age of Onset    Heart Attack Father 72        cause of death    Kidney Disease Mother     Other Mother         renal failure    Cancer Sister [de-identified]        Colon CA- cause of death     Heart Attack Sister     Colon Cancer Sister 68       REVIEW OF SYSTEMS:  Review of Systems   Constitutional: Negative for chills and fever. HENT: Negative for facial swelling and sore throat. Eyes: Negative for discharge and redness. Respiratory: Negative for chest tightness and wheezing.     Cardiovascular: Negative for chest pain and palpitations. Gastrointestinal: Negative for nausea and vomiting. Endocrine: Negative for polyphagia and polyuria. Genitourinary: Negative for decreased urine volume, difficulty urinating, dysuria, enuresis, flank pain, frequency, genital sores, hematuria, penile discharge, penile pain, penile swelling, scrotal swelling, testicular pain and urgency. Musculoskeletal: Negative for back pain and neck stiffness. Skin: Negative for rash and wound. Neurological: Negative for dizziness and headaches. Hematological: Negative for adenopathy. Does not bruise/bleed easily. Psychiatric/Behavioral: Negative for confusion and hallucinations. PHYSICAL EXAM:  /72   Pulse 74   Temp 97.2 °F (36.2 °C)   Ht 6' 1\" (1.854 m)   Wt 227 lb 6.4 oz (103.1 kg)   SpO2 92%   BMI 30.00 kg/m²   Physical Exam  Constitutional:       General: He is not in acute distress. Appearance: Normal appearance. He is well-developed. HENT:      Head: Normocephalic and atraumatic. Nose: Nose normal.   Eyes:      General: No scleral icterus. Conjunctiva/sclera: Conjunctivae normal.      Pupils: Pupils are equal, round, and reactive to light. Neck:      Trachea: No tracheal deviation. Cardiovascular:      Rate and Rhythm: Normal rate and regular rhythm. Pulmonary:      Effort: Pulmonary effort is normal. No respiratory distress. Breath sounds: No stridor. Abdominal:      General: There is no distension. Palpations: Abdomen is soft. There is no mass. Tenderness: There is no abdominal tenderness. Musculoskeletal:         General: No tenderness. Normal range of motion. Cervical back: Normal range of motion and neck supple. Lymphadenopathy:      Cervical: No cervical adenopathy. Skin:     General: Skin is warm and dry. Findings: No erythema. Neurological:      Mental Status: He is alert and oriented to person, place, and time.    Psychiatric:         Behavior: Behavior normal.         Judgment: Judgment normal.             DATA:    Results for orders placed or performed in visit on 06/24/22   POCT Urinalysis no Micro   Result Value Ref Range    Color, UA yellow     Clarity, UA clear     Glucose, UA POC neg     Bilirubin, UA neg     Ketones, UA neg     Spec Grav, UA 1.030     Blood, UA POC neg     pH, UA 5.5     Protein, UA POC neg     Urobilinogen, UA 0.2     Leukocytes, UA neg     Nitrite, UA neg     Appearance, Fluid Clear Clear, Slightly Cloudy     Lab Results   Component Value Date    PSA <0.01 06/17/2022    PSA <0.01 12/15/2021    PSA <0.01 06/17/2021    PSA <0.01 03/09/2021    PSA <0.01 12/09/2020       1. Frequency of urination  This is improved on Myrbetriq she will continue this. Residual today was 42  - POCT Urinalysis no Micro  - AR Measure, post-void residual, US, non-imaging    2. Prostate cancer ([facility] [address]  PSA remains undetectable we do his next PSA in 6 months    3. History of bladder cancer  He will be due his next surveillance cystoscopy August 2022  - Cytology, Non-Gyn; Future  - Cystoscopy; Future      Orders Placed This Encounter   Procedures    Cytology, Non-Gyn     Prior to next visit in august 2022     Standing Status:   Future     Standing Expiration Date:   10/24/2022     Order Specific Question:   PREVIOUS BIOPSY     Answer:   Yes     Order Specific Question:   PREOP DIAGNOSIS     Answer:   History of bladder cancer     Order Specific Question:   FROZEN SECTION - NO OR YES/SPECIMEN     Answer:   No    POCT Urinalysis no Micro    AR Measure, post-void residual, US, non-imaging     45ml    Cystoscopy     Next available in August     Standing Status:   Future     Standing Expiration Date:   6/24/2023     Scheduling Instructions: In August 2022        Return in about 2 months (around 8/24/2022) for Cystoscopy on next visit.     All information inputted into the note by the MA to include chief complaint, past medical history, past surgical history, medications, allergies, social and family history and review of systems has been reviewed and updated as needed by me. EMR Dragon/transcription disclaimer: Much of this documentt is electronic  transcription/translation of spoken language to printed text. The  electronic translation of spoken language may be erroneous, or at times,  nonsensical words or phrases may be inadvertently transcribed.  Although I  have reviewed the document for such errors, some may still exist.

## 2022-08-31 ENCOUNTER — PROCEDURE VISIT (OUTPATIENT)
Dept: UROLOGY | Age: 77
End: 2022-08-31
Payer: MEDICARE

## 2022-08-31 VITALS — TEMPERATURE: 97.9 F | BODY MASS INDEX: 30.35 KG/M2 | WEIGHT: 229 LBS | HEIGHT: 73 IN

## 2022-08-31 DIAGNOSIS — C61 PROSTATE CANCER (HCC): Primary | ICD-10-CM

## 2022-08-31 DIAGNOSIS — Z85.51 HISTORY OF BLADDER CANCER: ICD-10-CM

## 2022-08-31 LAB
BILIRUBIN, POC: NORMAL
BLOOD URINE, POC: NORMAL
CLARITY, POC: CLEAR
COLOR, POC: YELLOW
GLUCOSE URINE, POC: NORMAL
KETONES, POC: NORMAL
LEUKOCYTE EST, POC: NORMAL
NITRITE, POC: NORMAL
PH, POC: 5
PROTEIN, POC: NORMAL
SPECIFIC GRAVITY, POC: 1.02
UROBILINOGEN, POC: 0.2

## 2022-08-31 PROCEDURE — 52000 CYSTOURETHROSCOPY: CPT | Performed by: UROLOGY

## 2022-08-31 PROCEDURE — 81003 URINALYSIS AUTO W/O SCOPE: CPT | Performed by: UROLOGY

## 2022-08-31 RX ORDER — ATORVASTATIN CALCIUM 40 MG/1
TABLET, FILM COATED ORAL
COMMUNITY
Start: 2022-08-29

## 2022-08-31 RX ORDER — FUROSEMIDE 40 MG/1
TABLET ORAL
COMMUNITY
Start: 2022-07-13

## 2022-08-31 RX ORDER — LISINOPRIL 20 MG/1
TABLET ORAL
COMMUNITY
Start: 2022-07-13

## 2022-08-31 RX ORDER — SPIRONOLACTONE 25 MG/1
TABLET ORAL
COMMUNITY
Start: 2022-08-18

## 2022-08-31 NOTE — PROGRESS NOTES
BLADDER CANCER  Patient was first diagnosed with bladder cancer approximately 16 month(s) ago. Initial diagnosis was 4/16/2021  Last Recurrence: He has not had a recurrence since his initial diagnosis  Stage of bladder cancer at last recurrence: 8130/2 - Papillary transitional cell carcinoma, non-invasive, stage TA  Grade: low grade  Last urinary cytology/FISH results: not done; Date:   Hematuria? None  Last upper tract study: 4 month(s) ago. Study was a CT done 4/4/2022    Prostate Cancer  Patient is here today for prostate cancer which was first diagnosed 2 years ago. His prostate cancer can be characterized as clinical stage T2b [facility] 3+4 = 7, grade group 2. His last several PSA values are as follows:        Lab Results   Component Value Date     PSA <0.01 06/17/2022     PSA <0.01 12/15/2021     PSA <0.01 06/17/2021     PSA <0.01 03/09/2021     PSA <0.01 12/09/2020      Previous treatment of prostate cancer: Neoadjuvant hormonal therapy x6 months last given April 27, 2020  External beam radiation therapy completed 9/10/2020. Lower urinary tract symptoms: nocturia, 2 times per night. His PVR today was 42 he also complains of feeling of incomplete emptying he takes Myrbetriq 50 mg which gives him improvement  He is due his next PSA December 2022    Cystoscopy Operative Note (8/31/22)  Surgeon: Brett Collazo MD  Anesthesia: Urethral 2% Xylocaine   Indications: History bladder cancer  Position: Supine    Findings:   The patient was prepped and draped in the usual sterile fashion. The flexible cystoscope was advanced through the urethra and into the bladder under direct vision. The bladder was thoroughly inspected and the following was noted:    Residual Urine: mild  Urethra: normal appearing urethra with no masses, tenderness or lesions  Prostate: No significant obstructing lateral lobes of prostate; median lobe present? no.    Bladder: No tumors or CIS noted. No bladder diverticulum.   There was mild trabeculation noted. No recurrent papillary tumor seen there was a little bit of dystrophic calcification at the bladder neck at 5:00  Ureters: Clear efflux from both ureters. Orifices with normal configuration and location. The cystoscope was removed. The patient tolerated the procedure well. The patient was given a post procedure instructions and follow up. Results for orders placed or performed in visit on 08/31/22   POCT Urinalysis No Micro (Auto)   Result Value Ref Range    Color, UA yellow     Clarity, UA clear     Glucose, UA POC -     Bilirubin, UA -     Ketones, UA -     Spec Grav, UA 1.020     Blood, UA POC -     pH, UA 5.0     Protein, UA POC -     Urobilinogen, UA 0.2     Leukocytes, UA -     Nitrite, UA -            1. History of bladder cancer  Follow-up in 3 months for his next surveillance cystoscopy. - Cystoscopy  - POCT Urinalysis No Micro (Auto)  - Cytology, Non-Gyn  - Cytology, Non-Gyn; Future  - CT CYSTOURETHROSCOPY; Future    2.  Prostate cancer [facility])   follow-up in December for his PSA      Orders Placed This Encounter   Procedures    Cytology, Non-Gyn     Order Specific Question:   PREVIOUS BIOPSY     Answer:   Yes     Order Specific Question:   PREOP DIAGNOSIS     Answer:   hx bladder cancer     Order Specific Question:   FROZEN SECTION - NO OR YES/SPECIMEN     Answer:   No    Cytology, Non-Gyn     Prior to next visit in 3 mos     Standing Status:   Future     Standing Expiration Date:   8/31/2023     Order Specific Question:   PREVIOUS BIOPSY     Answer:   Yes     Order Specific Question:   PREOP DIAGNOSIS     Answer:   History of bladder cancer     Order Specific Question:   FROZEN SECTION - NO OR YES/SPECIMEN     Answer:   No    POCT Urinalysis No Micro (Auto)    CT CYSTOURETHROSCOPY     Cystoscopy in 3 months     Standing Status:   Future     Standing Expiration Date:   8/31/2023       Return in about 3 months (around 11/30/2022) for Cystoscopy on next visit, Urine Cytology prior to next visit.         Billy Barriga MD

## 2022-08-31 NOTE — LETTER
[facility]  [place] [address]  [address] [address]  Phone: [phone]  Fax: [phone]    Kendall Smalls MD    August 31, 2022     [facility], [address]  [place] [address]    Patient: Noa DickinsonMenominee   MR Number: [id number]   YOB: 1945   Date of Visit: 8/31/2022       Dear Pearl Goodwin:    Thank you for referring Tracy Terrazas to me for evaluation/treatment. Below are the relevant portions of my assessment and plan of care. If you have questions, please do not hesitate to call me. I look forward to [facility] along with you.     Sincerely,      Kendall Smalls MD

## 2022-10-05 ENCOUNTER — HOSPITAL ENCOUNTER (OUTPATIENT)
Dept: CT IMAGING | Age: 77
Discharge: HOME | End: 2022-10-05
Payer: MEDICARE

## 2022-10-05 DIAGNOSIS — C61 PROSTATE CANCER (HCC): ICD-10-CM

## 2022-10-05 DIAGNOSIS — C82.03 FOLLICULAR LYMPHOMA GRADE I OF INTRA-ABDOMINAL LYMPH NODES (HCC): ICD-10-CM

## 2022-10-05 DIAGNOSIS — C67.2 MALIGNANT NEOPLASM OF LATERAL WALL OF URINARY BLADDER (HCC): ICD-10-CM

## 2022-10-05 PROCEDURE — 71260 CT THORAX DX C+: CPT

## 2022-10-05 PROCEDURE — 6360000004 HC RX CONTRAST MEDICATION: Performed by: INTERNAL MEDICINE

## 2022-10-05 PROCEDURE — 74177 CT ABD & PELVIS W/CONTRAST: CPT | Performed by: RADIOLOGY

## 2022-10-05 PROCEDURE — 74177 CT ABD & PELVIS W/CONTRAST: CPT

## 2022-10-05 PROCEDURE — 71260 CT THORAX DX C+: CPT | Performed by: RADIOLOGY

## 2022-10-05 RX ADMIN — IOPAMIDOL 75 ML: 755 INJECTION, SOLUTION INTRAVENOUS at 10:20

## 2022-10-10 DIAGNOSIS — C61 PROSTATE CANCER (HCC): Primary | ICD-10-CM

## 2022-10-10 NOTE — PROGRESS NOTES
MEDICAL ONCOLOGY PROGRESS NOTE                                                          Alicia Cabrera   [birth year]  10/12/2022     Chief Complaint   Patient presents with    Follow-up     Prostate cancer ([address]          INTERVAL HISTORY/HISTORY OF PRESENT ILLNESS:  Alicia Cabrera presents today for a surveillance follow up visit. He has a diagnosis of prostate cancer and also follicular lymphoma stage Ia. In addition, he has been diagnosed with stage 0 bladder cancer in April 2021 and is status post transurethral resection of bladder tumor by urology [facility].   This is a telehealth visit. The patient lives in [place], [place]. He had CT scans performed. He is status post RT for his prostate cancer and also stage Ia follicular lymphoma. Denies any new complaints today. Denies any hematuria. Denies any B symptoms. Last PSA <0.01 on 12/15/2021. He had a cystoscopy performed on 8/31/2022 that showed no evidence of tumor or CIS. He had a repeat CT chest abdomen pelvis for surveillance. He denies any new complaints except for significant fatigue after some activity. 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  Stage I A  Noninvasive low-grade papillary urothelial carcinoma, April 2021     Treatment summary   03/24/2020- Retroperitoneal ressection of lymphoma at [facility]  7/6/20-7/31/20-IFRT- 27 Gy in 18 fractions to left retroperitoneal lymph node-[facility]/Dr. Abundio Doherty  6/24/20-8/21/20- 78 Gy in 42 fractions for prostate cancer -[facility]/Dr. Abundio Doherty  4/15/21 Bladder cancer-TURBT by Dr. Rajan Mo      Cancer History:  Mr. Lakshmi Soriano was first seen by me on 2/21/2020 referred by Dr. Damien Jenkins for further evaluation and recommendations regarding findings of retroperitoneal mass/adenopathy. The patient has recently been diagnosed with prostate cancer with 4 out of 12 positive cores from the left prostatic lobe. PSA at diagnosis 5.5. Pathology showed prostatic adenocarcinoma grade group 2 in the [facility] score 3+4 = 7. Clinical T2b. Imaging studies with CT chest abdomen pelvis showed evidence of retroperitoneal mass. 1/29/2020- CT of the abdomen with contrast showed scattered borderline sized mesenteric lymph nodes. Indeterminate 3 cm retroperitoneal soft tissue nodule within the upper left abdomen adjacent to the left renal vein. The mass measures 34 x 30 x 22 mm.  1/29/2020- bone scan showed focal increased activity in the mid thoracic spine in the right seventh posterior medial rib which may represent chronic degenerative changes/large osteophytes. No other areas suggesting bony metastatic disease. 2/13/2020- MRI thoracic spine showed no evidence of metastatic disease identified in the thoracic spine. In particular, there are no enhancing and/or infiltrative lesions identified in the thoracic spine or posterior ribs to correspond with the radiotracer activities from recent bone scan. It is possible that the bone scan findings are secondary to vertebral\" the vertebral degenerative change. 2/13/2020- CT chest showed no evidence of metastatic disease in the chest.  Nodule in the retroperitoneum of the upper abdomen presumably a lymph node. Additional borderline lymph nodes seen within the central mesenteric root. 2/21/2020- we discussed the findings of the images above. Recommend a PET scan to rule out metastatic disease. 03/04/200- Pet Ct- Isolated neoplastic focus within the upper abdomen left-sided retroperitoneum SUV 11. Neoplastic lymphadenopathy is favored. This is not in a location which can be sampled by percutaneous biopsy. Otherwise no bone lesion or chest abnormality is seen.  3. Other mildly prominent mesenteric lymphadenopathy shows low level FDG uptake and reactive nodes are favored over neoplastic mesenteric nodes. 3/24/2020- retroperitoneal mass resection3 cm mass off of the overlying the left renal vein at [facility] by Dr. Gwynn Lundborg, surgical oncology. Path compatible with follicular lymphoma, grade 1-2 out of 3. CD20-positive B cells that are positive for PAX-5, CD10, BCL6, and BCL-2 and negative for Cyclin D1. A CD21 stain highlights follicular dendritic meshworks associated with the B cell nodules. The Ki-67 proliferative index is approximately 10% within the nodules. CD3 highlights background T cells in interfollicular areas. 4/13/2020- recommend bone marrow to complete staging for his lymphoma. Recommended to follow-up with urology for discussion of treatment for his prostate cancer. If bone marrow shows no evidence of lymphoma involvement then will recommend involved field radiation therapy to the retroperitoneal lymph nodes. 4/13/2020-bone marrow biopsy/aspirate was unremarkable. No evidence of lymphomatous involvement. 4/24/2020- I discussed with the patient the findings of bone marrow PET scan. Therefore, stage I A. Recommended involved field RT to the retroperitoneal nodes. 7/6/20-7/31/20 Radiation therapy-1.5cGy to left retroperitoneal lymph node-[facility]/Dr. Mark Brown  6/24/20-8/21/20- 78 cGy in 43 fractions for prostate cancer -[facility]/Dr. Mark Brown  12/09/2020- PSA <0.01  01/11/2021- Ct Chest W Contrast Stable chest CT. 2. No new or acute abnormality. 01/11/2021-Ct Abdomen Pelvis W Contrast The resolution of the previously seen enlarged left retroperitoneal lymph node. The persistent mild to moderate mesenteric lymphadenopathy. The persistent mesenteric thickening in the upper to mid abdomen. 4/15/21 Bladder cancer-TURBT by Dr. Alok Blue: Urinary bladder, transurethral resection of left lateral wall bladder tumor: Noninvasive low-grade papillary urothelial carcinoma. Muscularis propria is identified, negative for evidence of malignancy. AJCC STAGE: pTa, pNx   4/20/21 CT abdomen/pelvis: Valle catheter within the bladder with diffuse bladder wall thickening and mild perivesicular fat stranding. Correlation for cystitis recommended. Radiation seeds prostate. No loculated pelvic fluid collection. Hepatic steatosis. Chronic findings of the spleen described above. Avascular necrosis of the femoral heads. 6/17/21 PSA <0.01  6/30/21 Urine, ThinPrep preparation (1): Benign squamous epithelial cells and benign urothelial cells. Rare atypical urothelial cells identified, nondiagnostic of malignancy. Scattered neutrophils. 10/4/21-CT chest/abdomen/pelvis showed no evidence of chest, abdominal or pelvic lymphadenopathy. Persistent mesenteric thickening which may represent retractile mesenteritis/mesenteric panniculitis. A persistent mild mesenteric adenopathy. The diverticulosis of the colon. No finding to suggest diverticulitis. A stable low-density nodule in the spleen. A Small paraumbilical hernia containing a loop of transverse colon and small bowel. No obstruction. 10/11/2021-essentially, CT chest abdomen pelvis showed no evidence of lymphoma recurrence. Continue clinical/radiologic surveillance  1/14/2022 Urine, ThinPrep: Benign squamous and urothelial cells. 1/31/2022-cystoscopy by Dr. Shlomo Frias showed no evidence of tumor or CIS.  4/4/2022 CT Chest W Contrast No thoracic lymphadenopathy. New 3 mm RIGHT upper lobe pulmonary nodule. This is likely represents an infectious or inflammatory process but a small metastatic lesion in also within the differential.Recommend special attention on follow-up imaging to confirm stability or resolution. 4/4/2022 CT Abd/Pelvis W IV Contrast (Oral) No lymphadenopathy in the abdomen or pelvis. No change in diffuse fat stranding throughout the mesentery,likely resenting meseneritis.   4/29/2022 Urine, clean catch: Negative foe high-grade urothelial carcinoma. Benign urothelial cells present. 6/17/22 PSA <0.01  8/30/2022 Urine, Thin Prep:Benign squamous and urothelial cells  10/5/2022 CT Chest W Contrast  No acute intrathoracic pathology, or evidence of intrathoracic malignancy appreciated, with findings, as above  10/5/2022 CT Abd/Pelvis W IV Contrast Persistent, and may be minimally increased mild mesenteric adenopathy, with adjacent stranding, a finding that can be seen with intra-abdominal lymphoma, with differential being mesenteric adenitis, correlate clinically.     PAST MEDICAL HISTORY:   Past Medical History:   Diagnosis Date    Diabetes mellitus ([address]     Elevated PSA     Hypertension     Kidney stone     Lymphoma ([address]     Prostate cancer ([facility] [address]           PAST SURGICAL HISTORY:  Past Surgical History:   Procedure Laterality Date    APPENDECTOMY      CYSTOSCOPY N/A 4/15/2021    CYSTOSCOPY, BILATERAL URETERAL CATHETERIZATIONS WITH RETROGRADES, URETHRAL DILATATION, CLOT EVACUATION AND FULGURATION OF BLEEDING, TRANSURETHRAL RESECTION OF BLADDER TUMOR performed by Yisel Jorgensen MD at [address] [place] [address]  2014    LAPAROTOMY      Resection of retroperitoneal lymphadenopathy    SHOULDER SURGERY  2016    VASECTOMY  1979        SOCIAL HISTORY:  Social History     Socioeconomic History    Marital status:    Tobacco Use    Smoking status: Never    Smokeless tobacco: Never   Vaping Use    Vaping Use: Never used       FAMILY HISTORY:  Family History   Problem Relation Age of Onset    Heart Attack Father 72        cause of death    Kidney Disease Mother     Other Mother         renal failure    Cancer Sister [de-identified]        Colon CA- cause of death     Heart Attack Sister     Colon Cancer Sister 68        Current Outpatient Medications   Medication Sig Dispense Refill    atorvastatin (LIPITOR) 40 MG tablet       furosemide (LASIX) 40 MG tablet       lisinopril (PRINIVIL;ZESTRIL) 20 MG tablet       spironolactone (ALDACTONE) 25 MG tablet mirabegron (MYRBETRIQ) 50 MG TB24 Take 50 mg by mouth daily 30 tablet 11    rivaroxaban (XARELTO) 20 MG TABS tablet Take 1 tablet by mouth daily 30 tablet 0    TRUE METRIX BLOOD GLUCOSE TEST strip USE 1 STRIP TO CHECK GLUCOSE TWICE DAILY      Travoprost, BAK Free, (TRAVATAN Z) 0.004 % SOLN ophthalmic solution Travatan Z 0.004 % eye drops   INSTILL 1 DROP INTO EACH EYE AT BEDTIME      pantoprazole (PROTONIX) 40 MG tablet TAKE 1 TABLET BY MOUTH ONCE DAILY      glipiZIDE (GLUCOTROL) 5 MG tablet Take 5 mg by mouth daily       meclizine (ANTIVERT) 25 MG tablet Take 25 mg by mouth 3 times daily as needed       SITagliptin (JANUVIA) 100 MG tablet Take 100 mg by mouth daily       sertraline (ZOLOFT) 100 MG tablet daily       cetirizine (ZYRTEC) 10 MG tablet Zyrtec 10 mg tablet   Take 1 tablet every day by oral route. rizatriptan (MAXALT) 10 MG tablet as needed        No current facility-administered medications for this visit.         REVIEW OF SYSTEMS:   CONSTITUTIONAL: no fever, no night sweats, fatigue;  HEENT: no blurring of vision, no double vision, no hearing difficulty, no tinnitus, no ulceration, no dysplasia, no epistaxis;   LUNGS: no cough, no hemoptysis, no wheeze,   shortness of breath;  CARDIOVASCULAR: no palpitation, no chest pain, shortness of breath;  GI: no abdominal pain, no nausea, no vomiting, no diarrhea, no constipation;  NIKHIL: no dysuria, no hematuria, no frequency or urgency, no nephrolithiasis;  MUSCULOSKELETAL: no joint pain, no swelling, no stiffness;  ENDOCRINE: no polyuria, no polydipsia, no cold or heat intolerance;  HEMATOLOGY: no easy bruising or bleeding, no history of clotting disorder;  DERMATOLOGY: no skin rash, no eczema, no pruritus;  PSYCHIATRY: no depression, no anxiety, no panic attacks, no suicidal ideation, no homicidal ideation;  NEUROLOGY: no syncope, no seizures, no numbness or tingling of hands, no numbness or tingling of feet, no paresis;     /62   Pulse 76   Ht 6' 1\" (1.854 m)   Wt 232 lb (105.2 kg)   SpO2 99%   BMI 30.61 kg/m²     PHYSICAL EXAM:  CONSTITUTIONAL: Alert, appropriate, no acute distress  EYES: Non icteric, EOM intact, pupils equal round   ENT: Mucus membranes moist, no oral pharyngeal lesions, external inspection of ears and nose are normal.  NECK: Supple, no masses. No palpable thyroid mass  CHEST/LUNGS: CTA bilaterally, normal respiratory effort   CARDIOVASCULAR: RRR, no murmurs. No lower extremity edema  ABDOMEN: soft non-tender, active bowel sounds, no HSM. No palpable masses  EXTREMITIES: warm, full ROM in all 4 extremities, no focal weakness. SKIN: warm, dry with no rashes or lesions  LYMPH: No cervical, clavicular, axillary, or inguinal lymphadenopathy  NEUROLOGIC: follows commands, non focal   PSYCH: mood and affect appropriate. Alert and oriented to time, place, person      Relevant Lab findings/reviewed by me:  6/17/22 PSA <0.01    Lab Results   Component Value Date    WBC 7.15 10/12/2022    HGB 13.9 10/12/2022    HCT 43.2 10/12/2022    .8 (H) 10/12/2022     (L) 10/12/2022     Lab Results   Component Value Date    NEUTROABS 4.66 10/12/2022     Relevant Imaging studies/reviewed by me:  10/5/2022 CT Chest W Contrast  No acute intrathoracic pathology, or evidence of intrathoracic malignancy appreciated, with findings, as above    10/5/2022 CT Abd/Pelvis W IV Contrast Persistent, and may be minimally increased mild mesenteric adenopathy, with adjacent stranding, a finding that can be seen with intra-abdominal lymphoma, with differential being mesenteric adenitis, correlate clinically. ASSESSMENT:    No orders of the defined types were placed in this encounter. Meaghan Smith was seen today for follow-up.     Diagnoses and all orders for this visit:    Prostate cancer ([address]    Follicular lymphoma grade I of intra-abdominal lymph nodes ([address]    Malignant neoplasm of lateral wall of urinary bladder [facility])    Care plan discussed with patient      Prostate cancer [id number] status post RT  6/24/20-8/21/20- 78 cGy in 42 fractions for prostate cancer -[facility]/Dr. Basia Lerma  Last PSA <0.01 Dec 2021  CT chest abdomen pelvis January 2021-stable retroperitoneal adenopathy. CT chest abdomen pelvis October 2021-no evidence of lymphoma recurrence. No evidence of metastatic disease. CT chest abdomen pelvis April 2022-no evidence of metastatic disease, lymphoma recurrence. 3 mm right upper lobe nodule. -CT C/A/P showed no evidence of progression. Follicular lymphoma grade 1-2, Ki-67 10%, Stage I  Essentially, retroperitoneal adenopathy consistent with follicular lymphoma grade 1-2 with low Ki-67. The patient had a significant debulking surgery at [facility]. Bone marrow biopsy showed no evidence of lymphomatous involvement. PET scan showed no other evidence of metastatic disease. Therefore stage I disease. He received involved field RT as per NCCN guidelines completed July 2020. Recommended surveillance NCCN recommendation. CT chest abdomen pelvis January 2021-stable retroperitoneal adenopathy. CT chest abdomen pelvis October 2021-no evidence of lymphoma recurrence. No evidence of metastatic disease. CT scans chest abdomen pelvis every 6 months x2 years through July 2022. -CT C/A/P-3 mm right upper lobe nodule (new). Attention to follow-up in 6 months  -Repeat CT scans 6-month around October 2022      Hot flashes- off oxybutinin. Discuss with primary care Effexor    Stage 0 bladder cancer -continue cystoscopy surveillance with urology  1/31/2022-cystoscopy by Dr. Marquis Wood showed no evidence of tumor or CIS. Cytology negative. -Next cystoscopy end of April    Sub-centimeter pulmonary nodule  -3 mm new right upper lobe nodule. Differential diagnosis include inflammatory/infectious. Cannot rule out metastatic disease but less likely.   -CT C/AP    PLAN:  RTC with MD 6 months  CBC PSA today  Follow-up with Dr. Michael Patel for prostate cancer and bladder cancer surveillance      Follow Up:     Return for CBC, Appointment with Dr. Kylee Purcell. Irina Gamble am pre charting  as Medical Assistant for Akiko Whelan MD. Electronically signed by Polly Perez MA on 10/12/2022 at 2:49 PM CDT. Byron Upton am scribing for Akiko Whelan MD. Electronically signed by Erik Day RN on 10/12/2022 at 11:10 AM CDT. I, Dr Melissa Arguelles, personally performed the services described in this documentation as scribed by Erik Day RN in my presence and is both accurate and complete. I have seen, examined and reviewed this patient medication list, appropriate labs and imaging studies. I reviewed relevant medical records and others physicians notes. I discussed the plans of care with the patient. I answered all the questions to the patients satisfaction. I have also reviewed the chief complaint (CC) and part of the history (History of Present Illness (HPI), Past Family Social History [facility]), or Review of Systems (ROS) and made changes when appropriated.        (Please note that portions of this note were completed with a voice recognition program. Efforts were made to edit the dictations but occasionally words are mis-transcribed.)  Electronically signed by Akiko Whelan MD on 10/12/2022 at 11:10 AM

## 2022-10-12 ENCOUNTER — HOSPITAL ENCOUNTER (OUTPATIENT)
Dept: INFUSION THERAPY | Age: 77
Discharge: HOME | End: 2022-10-12
Payer: MEDICARE

## 2022-10-12 ENCOUNTER — OFFICE VISIT (OUTPATIENT)
Dept: HEMATOLOGY | Age: 77
End: 2022-10-12
Payer: MEDICARE

## 2022-10-12 VITALS
OXYGEN SATURATION: 99 % | BODY MASS INDEX: 30.75 KG/M2 | DIASTOLIC BLOOD PRESSURE: 62 MMHG | HEART RATE: 76 BPM | HEIGHT: 73 IN | WEIGHT: 232 LBS | SYSTOLIC BLOOD PRESSURE: 110 MMHG

## 2022-10-12 DIAGNOSIS — C67.2 MALIGNANT NEOPLASM OF LATERAL WALL OF URINARY BLADDER (HCC): ICD-10-CM

## 2022-10-12 DIAGNOSIS — Z71.89 CARE PLAN DISCUSSED WITH PATIENT: ICD-10-CM

## 2022-10-12 DIAGNOSIS — C61 PROSTATE CANCER (HCC): Primary | ICD-10-CM

## 2022-10-12 DIAGNOSIS — C61 PROSTATE CANCER (HCC): ICD-10-CM

## 2022-10-12 DIAGNOSIS — C82.03 FOLLICULAR LYMPHOMA GRADE I OF INTRA-ABDOMINAL LYMPH NODES (HCC): ICD-10-CM

## 2022-10-12 LAB
BASOPHILS ABSOLUTE: 0.03 K/UL (ref 0.01–0.08)
BASOPHILS RELATIVE PERCENT: 0.4 % (ref 0.1–1.2)
EOSINOPHILS ABSOLUTE: 0.12 K/UL (ref 0.04–0.54)
EOSINOPHILS RELATIVE PERCENT: 1.7 % (ref 0.7–7)
HCT VFR BLD CALC: 43.2 % (ref 40.1–51)
HEMOGLOBIN: 13.9 G/DL (ref 13.7–17.5)
LYMPHOCYTES ABSOLUTE: 1.56 K/UL (ref 1.18–3.74)
LYMPHOCYTES RELATIVE PERCENT: 21.8 % (ref 19.3–53.1)
MCH RBC QN AUTO: 33.4 PG (ref 25.7–32.2)
MCHC RBC AUTO-ENTMCNC: 32.2 G/DL (ref 32.3–36.5)
MCV RBC AUTO: 103.8 FL (ref 79–92.2)
MONOCYTES ABSOLUTE: 0.75 K/UL (ref 0.24–0.82)
MONOCYTES RELATIVE PERCENT: 10.5 % (ref 4.7–12.5)
NEUTROPHILS ABSOLUTE: 4.66 K/UL (ref 1.56–6.13)
NEUTROPHILS RELATIVE PERCENT: 65.2 % (ref 34–71.1)
PDW BLD-RTO: 13.3 % (ref 11.6–14.4)
PLATELET # BLD: 154 K/UL (ref 163–337)
PMV BLD AUTO: 9.6 FL (ref 7.4–10.4)
PROSTATE SPECIFIC ANTIGEN: <0.01 NG/ML (ref 0–4)
RBC # BLD: 4.16 M/UL (ref 4.63–6.08)
WBC # BLD: 7.15 K/UL (ref 4.23–9.07)

## 2022-10-12 PROCEDURE — 1036F TOBACCO NON-USER: CPT | Performed by: INTERNAL MEDICINE

## 2022-10-12 PROCEDURE — 1123F ACP DISCUSS/DSCN MKR DOCD: CPT | Performed by: INTERNAL MEDICINE

## 2022-10-12 PROCEDURE — G8417 CALC BMI ABV UP PARAM F/U: HCPCS | Performed by: INTERNAL MEDICINE

## 2022-10-12 PROCEDURE — G8427 DOCREV CUR MEDS BY ELIG CLIN: HCPCS | Performed by: INTERNAL MEDICINE

## 2022-10-12 PROCEDURE — G8484 FLU IMMUNIZE NO ADMIN: HCPCS | Performed by: INTERNAL MEDICINE

## 2022-10-12 PROCEDURE — 85025 COMPLETE CBC W/AUTO DIFF WBC: CPT

## 2022-10-12 PROCEDURE — 99212 OFFICE O/P EST SF 10 MIN: CPT

## 2022-10-12 PROCEDURE — 36415 COLL VENOUS BLD VENIPUNCTURE: CPT

## 2022-10-12 PROCEDURE — 99213 OFFICE O/P EST LOW 20 MIN: CPT | Performed by: INTERNAL MEDICINE

## 2022-12-07 ENCOUNTER — PROCEDURE VISIT (OUTPATIENT)
Dept: UROLOGY | Age: 77
End: 2022-12-07
Payer: MEDICARE

## 2022-12-07 VITALS — HEIGHT: 73 IN | BODY MASS INDEX: 30.83 KG/M2 | TEMPERATURE: 97.3 F | WEIGHT: 232.6 LBS

## 2022-12-07 DIAGNOSIS — R39.15 URGENCY OF URINATION: ICD-10-CM

## 2022-12-07 DIAGNOSIS — Z85.51 HISTORY OF BLADDER CANCER: ICD-10-CM

## 2022-12-07 DIAGNOSIS — N21.0 BLADDER CALCULUS: ICD-10-CM

## 2022-12-07 DIAGNOSIS — C61 PROSTATE CANCER (HCC): Primary | ICD-10-CM

## 2022-12-07 LAB
APPEARANCE FLUID: CLEAR
BILIRUBIN, POC: NORMAL
BLOOD URINE, POC: NORMAL
CLARITY, POC: CLEAR
COLOR, POC: YELLOW
GLUCOSE URINE, POC: NORMAL
KETONES, POC: NORMAL
LEUKOCYTE EST, POC: NORMAL
NITRITE, POC: NORMAL
PH, POC: 5.5
PROTEIN, POC: NORMAL
SPECIFIC GRAVITY, POC: 1.01
UROBILINOGEN, POC: 0.2

## 2022-12-07 PROCEDURE — 99214 OFFICE O/P EST MOD 30 MIN: CPT | Performed by: UROLOGY

## 2022-12-07 PROCEDURE — 52000 CYSTOURETHROSCOPY: CPT | Performed by: UROLOGY

## 2022-12-07 PROCEDURE — 1123F ACP DISCUSS/DSCN MKR DOCD: CPT | Performed by: UROLOGY

## 2022-12-07 PROCEDURE — 1036F TOBACCO NON-USER: CPT | Performed by: UROLOGY

## 2022-12-07 PROCEDURE — 81002 URINALYSIS NONAUTO W/O SCOPE: CPT | Performed by: UROLOGY

## 2022-12-07 PROCEDURE — G8484 FLU IMMUNIZE NO ADMIN: HCPCS | Performed by: UROLOGY

## 2022-12-07 PROCEDURE — G8427 DOCREV CUR MEDS BY ELIG CLIN: HCPCS | Performed by: UROLOGY

## 2022-12-07 PROCEDURE — G8417 CALC BMI ABV UP PARAM F/U: HCPCS | Performed by: UROLOGY

## 2022-12-07 ASSESSMENT — ENCOUNTER SYMPTOMS
EYE DISCHARGE: 0
CHEST TIGHTNESS: 0
BACK PAIN: 0
NAUSEA: 0
FACIAL SWELLING: 0
WHEEZING: 0
VOMITING: 0
ABDOMINAL PAIN: 1
EYE REDNESS: 0
SORE THROAT: 0

## 2022-12-07 NOTE — PROGRESS NOTES
Vazquez Rodriguez is a 69 y.o. male who presents today   Chief Complaint   Patient presents with    Cystoscopy     I am here today for a 3 month cystoscopy. I had my urine cytology done prior. Prostate Cancer  Patient is here today for prostate cancer which was first diagnosed approximately 3 years ago. January 2020  His prostate cancer can be characterized as clinical stage T2b [facility] 3+4 = 7 grade group 2. Hormone sensitive. Undetectable PSA  His last several PSA values are as follows:  Lab Results   Component Value Date    PSA <0.01 10/12/2022    PSA <0.01 06/17/2022    PSA <0.01 12/15/2021    PSA <0.01 06/17/2021    PSA <0.01 03/09/2021     Previous treatment of prostate cancer: Neoadjuvant hormonal therapy x6 months last given April 27, 2020  External beam radiation therapy completed 9/10/2020  Lower urinary tract symptoms: urgency, frequency, he is on Myrbetriq he feels like his voiding is improved and asks about stopping it today     BLADDER CANCER  Patient was first diagnosed with bladder cancer approximately 18 month(s) ago. Last Recurrence: His initial diagnosis was 4/16/2021. He has not had a recurrence he is here for surveillance cystoscopy  Stage of bladder cancer at last recurrence: 8130/2 - Papillary transitional cell carcinoma, non-invasive, stage TA  Grade: low grade  Last urinary cytology/FISH results: negative; Date: 8/31/2022  Hematuria? Last upper tract study: 1 month(s) ago. Study was a CT for 10/05/2022  CT scan showed a small calcification appears to be in the wall of the bladder right at the bladder neck on the left side. Cystoscopy Operative Note (12/7/22)  Surgeon: Marnie Gupta MD  Anesthesia: Urethral 2% Xylocaine   Indications: History of bladder cancer  Position: Supine    Findings:   The patient was prepped and draped in the usual sterile fashion. The flexible cystoscope was advanced through the urethra and into the bladder under direct vision.   The bladder was thoroughly inspected and the following was noted:    Residual Urine: mild  Urethra: normal appearing urethra with no masses, tenderness or lesions  Prostate: partially obstructing lateral lobes of prostate; median lobe present? no.  Calcification at the bladder neck on the left side this is consistent with what was seen on the CT scan. I was able to dislodge this it is now free-floating the bladder. He was not able to pass this when he emptied his bladder post cystoscopy with a strainer. A sending home with a strainer  Bladder: No tumors or CIS noted. No bladder diverticulum. There was moderate trabeculation noted. No recurrent papillary tumor seen today. Ureters: Clear efflux from both ureters. Orifices with normal configuration and location. The cystoscope was removed. The patient tolerated the procedure well. The patient was given a post procedure instructions and follow up. Past Medical History:   Diagnosis Date    Diabetes mellitus ([facility] [address]     Elevated PSA     Hypertension     Kidney stone     Lymphoma ([facility] [address]     Prostate cancer [facility])        Past Surgical History:   Procedure Laterality Date    ABDOMINAL HERNIA REPAIR      middle of abdomen.  Dr. Diego Hebert in [address] N/A 04/15/2021    CYSTOSCOPY, BILATERAL URETERAL CATHETERIZATIONS WITH RETROGRADES, URETHRAL DILATATION, CLOT EVACUATION AND FULGURATION OF BLEEDING, TRANSURETHRAL RESECTION OF BLADDER TUMOR performed by Ileana Zhu MD at [address] [place] [address]  2014    LAPAROTOMY      Resection of retroperitoneal lymphadenopathy    SHOULDER SURGERY  2016    VASECTOMY  1979       Current Outpatient Medications   Medication Sig Dispense Refill    atorvastatin (LIPITOR) 40 MG tablet       furosemide (LASIX) 40 MG tablet       lisinopril (PRINIVIL;ZESTRIL) 20 MG tablet       spironolactone (ALDACTONE) 25 MG tablet       rivaroxaban (XARELTO) 20 MG TABS tablet Take 1 tablet by mouth daily 30 tablet 0    TRUE METRIX BLOOD GLUCOSE TEST strip USE 1 STRIP TO CHECK GLUCOSE TWICE DAILY      Travoprost, BAK Free, (TRAVATAN Z) 0.004 % SOLN ophthalmic solution Travatan Z 0.004 % eye drops   INSTILL 1 DROP INTO EACH EYE AT BEDTIME      pantoprazole (PROTONIX) 40 MG tablet TAKE 1 TABLET BY MOUTH ONCE DAILY      glipiZIDE (GLUCOTROL) 5 MG tablet Take 5 mg by mouth daily       meclizine (ANTIVERT) 25 MG tablet Take 25 mg by mouth 3 times daily as needed       SITagliptin (JANUVIA) 100 MG tablet Take 100 mg by mouth daily       sertraline (ZOLOFT) 100 MG tablet daily       cetirizine (ZYRTEC) 10 MG tablet Zyrtec 10 mg tablet   Take 1 tablet every day by oral route. rizatriptan (MAXALT) 10 MG tablet as needed        No current facility-administered medications for this visit. Allergies   Allergen Reactions    Ticlopidine Hives and Swelling           Flagyl [Metronidazole]      weakness       Social History     Socioeconomic History    Marital status:      Spouse name: None    Number of children: None    Years of education: None    Highest education level: None   Tobacco Use    Smoking status: Never    Smokeless tobacco: Never   Vaping Use    Vaping Use: Never used       Family History   Problem Relation Age of Onset    Heart Attack Father 72        cause of death    Kidney Disease Mother     Other Mother         renal failure    Cancer Sister [de-identified]        Colon CA- cause of death     Heart Attack Sister     Colon Cancer Sister 68       REVIEW OF SYSTEMS:  Review of Systems   Constitutional:  Negative for chills and fever. HENT:  Negative for facial swelling and sore throat. Eyes:  Negative for discharge and redness. Respiratory:  Negative for chest tightness and wheezing. Cardiovascular:  Negative for chest pain and palpitations. Gastrointestinal:  Positive for abdominal pain (He had just had a ventral hernia repair). Negative for nausea and vomiting. Endocrine: Negative for polyphagia and polyuria. Genitourinary:  Negative for decreased urine volume, difficulty urinating, dysuria, enuresis, flank pain, frequency, genital sores, hematuria, penile discharge, penile pain, penile swelling, scrotal swelling, testicular pain and urgency. Musculoskeletal:  Negative for back pain and neck stiffness. Skin:  Negative for rash and wound. Neurological:  Negative for dizziness and headaches. Hematological:  Negative for adenopathy. Does not bruise/bleed easily. Psychiatric/Behavioral:  Negative for confusion and hallucinations. PHYSICAL EXAM:  Temp 97.3 °F (36.3 °C)   Ht 6' 1\" (1.854 m)   Wt 232 lb 9.6 oz (105.5 kg)   BMI 30.69 kg/m²   Physical Exam  Constitutional:       General: He is not in acute distress. Appearance: Normal appearance. He is well-developed. HENT:      Head: Normocephalic and atraumatic. Nose: Nose normal.   Eyes:      General: No scleral icterus. Conjunctiva/sclera: Conjunctivae normal.      Pupils: Pupils are equal, round, and reactive to light. Neck:      Trachea: No tracheal deviation. Cardiovascular:      Rate and Rhythm: Normal rate and regular rhythm. Pulses: Normal pulses. Pulmonary:      Effort: Pulmonary effort is normal. No respiratory distress. Breath sounds: No stridor. Abdominal:      General: There is no distension. Palpations: Abdomen is soft. There is no mass. Tenderness: There is abdominal tenderness. Genitourinary:     Penis: Normal and uncircumcised. Testes: Normal.   Musculoskeletal:         General: No tenderness or deformity. Normal range of motion. Cervical back: Normal range of motion and neck supple. Lymphadenopathy:      Cervical: No cervical adenopathy. Skin:     General: Skin is warm and dry. Findings: No erythema. Neurological:      General: No focal deficit present. Mental Status: He is alert and oriented to person, place, and time.    Psychiatric:         Behavior: Behavior normal. Judgment: Judgment normal.           DATA:  CMP:    Lab Results   Component Value Date/Time     04/20/2021 07:15 AM    K 3.9 04/20/2021 07:15 AM     04/20/2021 07:15 AM    CO2 25 04/20/2021 07:15 AM    BUN 18 04/20/2021 07:15 AM    CREATININE 1.2 04/04/2022 09:40 AM    CREATININE 1.0 04/20/2021 07:15 AM    GFRAA >60 04/04/2022 09:40 AM    AGRATIO 1.9 01/11/2021 12:18 PM    LABGLOM 59 04/04/2022 09:40 AM    GLUCOSE 158 04/20/2021 07:15 AM    PROT 7.5 04/20/2021 07:15 AM    LABALBU 4.0 04/20/2021 07:15 AM    CALCIUM 9.5 04/20/2021 07:15 AM    BILITOT 0.4 04/20/2021 07:15 AM    ALKPHOS 72 04/20/2021 07:15 AM    AST 22 04/20/2021 07:15 AM    ALT 22 04/20/2021 07:15 AM     Results for orders placed or performed in visit on 12/07/22   POCT Urinalysis no Micro   Result Value Ref Range    Color, UA yellow     Clarity, UA clear     Glucose, UA POC neg     Bilirubin, UA neg     Ketones, UA neg     Spec Grav, UA 1.015     Blood, UA POC neg     pH, UA 5.5     Protein, UA POC neg     Urobilinogen, UA 0.2     Leukocytes, UA neg     Nitrite, UA eng     Appearance, Fluid Clear Clear, Slightly Cloudy     Lab Results   Component Value Date    PSA <0.01 10/12/2022    PSA <0.01 06/17/2022    PSA <0.01 12/15/2021    PSA <0.01 06/17/2021    PSA <0.01 03/09/2021         IMAGING:  I reviewed the abdominal CT scan he had done on 10/5/2022. Regarding his upper tracts this was done with contrast on delayed imaging. This showed normal kidneys bilaterally no hydronephrosis no abnormality in the course the ureters. There is a small 2 to 3 mm calcification in the wall of the left side of the bladder towards the bladder neck. 1. History of bladder cancer  Cystoscopy today showed no recurrence. He will follow-up in 3 months for next surveillance cystoscopy. - OR CYSTOURETHROSCOPY  - POCT Urinalysis no Micro  - Cytology, Non-Gyn; Future  - OR CYSTOURETHROSCOPY; Future    2.  Prostate cancer ([facility] [address]  PSA done 10/12/2022 was undetectable. He will be due his next PSA in 6 months    3. Urgency of urination  He thinks his urgency and frequency has improved I told him he can go ahead and stop Myrbetriq and see how he does. 4. Bladder calculus  This was attached to the bladder neck I was able to dislodge it with end of the scope was now free-floating in the bladder. He was given a strainer should be small enough that he should be able to pass this should he not pass it  we can remove it with a grasper at the neck cystoscopy      Orders Placed This Encounter   Procedures    Cytology, Non-Gyn     Prior to next visit in 3 mos     Standing Status:   Future     Standing Expiration Date:   12/7/2023     Order Specific Question:   PREVIOUS BIOPSY     Answer:   Yes     Order Specific Question:   PREOP DIAGNOSIS     Answer:   History of bladder cancer     Order Specific Question:   FROZEN SECTION - NO OR YES/SPECIMEN     Answer:   No    POCT Urinalysis no Micro    [place] CYSTOURETHROSCOPY     Cystoscopy in 3 months     Standing Status:   Future     Standing Expiration Date:   12/7/2023        Return for Cystoscopy on next visit, Urine Cytology prior to next visit. All information inputted into the note by the MA to include chief complaint, past medical history, past surgical history, medications, allergies, social and family history and review of systems has been reviewed and updated as needed by me. EMR Dragon/transcription disclaimer: Much of this documentt is electronic  transcription/translation of spoken language to printed text. The  electronic translation of spoken language may be erroneous, or at times,  nonsensical words or phrases may be inadvertently transcribed.  Although I  have reviewed the document for such errors, some may still exist.

## 2023-02-23 DIAGNOSIS — Z85.51 HISTORY OF BLADDER CANCER: ICD-10-CM

## 2023-03-09 ENCOUNTER — PROCEDURE VISIT (OUTPATIENT)
Dept: UROLOGY | Age: 78
End: 2023-03-09
Payer: MEDICARE

## 2023-03-09 VITALS — WEIGHT: 228 LBS | TEMPERATURE: 98.2 F | HEIGHT: 73 IN | BODY MASS INDEX: 30.22 KG/M2

## 2023-03-09 DIAGNOSIS — Z85.51 HISTORY OF BLADDER CANCER: ICD-10-CM

## 2023-03-09 DIAGNOSIS — C61 PROSTATE CANCER (HCC): Primary | ICD-10-CM

## 2023-03-09 LAB
BACTERIA URINE, POC: 0
BILIRUBIN URINE: 0 MG/DL
BLOOD, URINE: POSITIVE
CASTS URINE, POC: 0
CLARITY: CLEAR
COLOR: YELLOW
CRYSTALS URINE, POC: 0
EPI CELLS URINE, POC: ABNORMAL
GLUCOSE URINE: ABNORMAL
KETONES, URINE: NEGATIVE
LEUKOCYTE EST, POC: ABNORMAL
NITRITE, URINE: NEGATIVE
PH UA: 5.5 (ref 4.5–8)
PROTEIN UA: POSITIVE
RBC URINE, POC: ABNORMAL
SPECIFIC GRAVITY UA: 1.03 (ref 1–1.03)
UROBILINOGEN, URINE: NORMAL
WBC URINE, POC: 0
YEAST URINE, POC: 0

## 2023-03-09 PROCEDURE — 52000 CYSTOURETHROSCOPY: CPT | Performed by: UROLOGY

## 2023-03-09 PROCEDURE — 81001 URINALYSIS AUTO W/SCOPE: CPT | Performed by: UROLOGY

## 2023-03-09 NOTE — PROGRESS NOTES
BLADDER CANCER  Patient was first diagnosed with bladder cancer approximately 2 years(s) ago. Last Recurrence: Initial diagnosis was 4/16/2021 he has not had a recurrence he is here for his surveillance cystoscopy today. Stage of bladder cancer at last recurrence: 8130/2 - Papillary transitional cell carcinoma, non-invasive, stage TA  Grade: low grade  Last urinary cytology/FISH results: negative; Date: 2/22/2023  Hematuria? None  Last upper tract study: 6 month(s) ago. Study was a CT with and without contrast on 10/5/2020    Prostate Cancer  Patient is here today for prostate cancer which was first diagnosed approximately 3 years ago. January 2020  His prostate cancer can be characterized as clinical stage T2b [facility] 3+4 = 7 grade group 2. Hormone sensitive. Undetectable PSA  His last several PSA values are as follows:        Lab Results   Component Value Date     PSA <0.01 10/12/2022     PSA <0.01 06/17/2022     PSA <0.01 12/15/2021     PSA <0.01 06/17/2021     PSA <0.01 03/09/2021      Previous treatment of prostate cancer: Neoadjuvant hormonal therapy x6 months last given April 27, 2020  External beam radiation therapy completed 9/10/2020  Lower urinary tract symptoms: urgency, frequency, he is on Myrbetriq he feels like his voiding is improved. His last visit he has not stopped the Myrbetriq but apparently he still taking it. Cystoscopy Operative Note (3/9/23)  Surgeon: Kirk Loomis MD  Anesthesia: Urethral 2% Xylocaine   Indications: History of bladder cancer  Position: Supine    Findings:   The patient was prepped and draped in the usual sterile fashion. The flexible cystoscope was advanced through the urethra and into the bladder under direct vision.   The bladder was thoroughly inspected and the following was noted:    Residual Urine: mild  Urethra: normal appearing urethra with no masses, tenderness or lesions  Prostate: completely obstructing lateral lobes of prostate moderate size mild intravesical protrusion; median lobe present? no.  But does have somewhat of a high central zone of bladder neck with some mild intravesical protrusion  Bladder: No tumors or CIS noted. No bladder diverticulum. There was mild trabeculation noted. No recurrent papillary tumor seen no bladder stone seen. There are some slight dystrophic calcification at 3:00 at the bladder neck seen previously  Ureters: Clear efflux from both ureters. Orifices with normal configuration and location. The cystoscope was removed. The patient tolerated the procedure well. The patient was given a post procedure instructions and follow up. Results for orders placed or performed in visit on 03/09/23   POCT Urinalysis Dipstick w/ Micro (Auto)   Result Value Ref Range    Color, UA Yellow     Clarity, UA Clear Clear    Glucose, Ur neg     Bilirubin Urine 0 mg/dL    Ketones, Urine Negative     Specific Gravity, UA 1.030 1.005 - 1.030    Blood, Urine Positive     pH, UA 5.5 4.5 - 8.0    Protein, UA Positive (A) Negative    Nitrite, Urine Negative     Leukocytes, UA neg     Urobilinogen, Urine Normal     RBC Urine, POC 0-1     WBC Urine, POC 0     Bacteria Urine, POC 0     yeast urine, poc 0     Casts Urine, POC 0     Epi Cells Urine, POC rare     crystals urine, poc 0            1. History of bladder cancer  No recurrence. He is now 2 years out we will extend his interval to every 6 months. - IN CYSTOURETHROSCOPY  - Cytology, Non-Gyn; Future  - IN CYSTOURETHROSCOPY; Future  - POCT Urinalysis Dipstick w/ Micro (Auto)    2. Prostate cancer [facility])  He will be due his follow-up for prostate cancer PSA at the next visit. He will try stopping the Myrbetriq he can restart this if needed  - PSA, Diagnostic;  Future  - POCT Urinalysis Dipstick w/ Micro (Auto)      Orders Placed This Encounter   Procedures    PSA, Diagnostic     PSA in 6 month     Standing Status:   Future     Standing Expiration Date:   3/8/2024    Cytology, Non-Gyn Prior to next visit in 6 mos     Standing Status:   Future     Standing Expiration Date:   3/8/2024     Order Specific Question:   PREVIOUS BIOPSY     Answer:   Yes     Order Specific Question:   PREOP DIAGNOSIS     Answer:   History of bladder cancer     Order Specific Question:   FROZEN SECTION - NO OR YES/SPECIMEN     Answer:   No    POCT Urinalysis Dipstick w/ Micro (Auto)    [place] CYSTOURETHROSCOPY     Cystoscopy in 6 months     Standing Status:   Future     Standing Expiration Date:   3/9/2024       Return in about 6 months (around 9/9/2023) for PSA prior to vext visit, Cystoscopy on next visit, Urine Cytology prior to next visit.         Demian Sifuentes MD

## 2023-04-12 ENCOUNTER — HOSPITAL ENCOUNTER (OUTPATIENT)
Dept: INFUSION THERAPY | Age: 78
Discharge: HOME | End: 2023-04-12
Payer: MEDICARE

## 2023-04-12 DIAGNOSIS — C67.2 MALIGNANT NEOPLASM OF LATERAL WALL OF URINARY BLADDER (HCC): ICD-10-CM

## 2023-04-12 DIAGNOSIS — C82.03 FOLLICULAR LYMPHOMA GRADE I OF INTRA-ABDOMINAL LYMPH NODES (HCC): ICD-10-CM

## 2023-04-12 DIAGNOSIS — C61 PROSTATE CANCER (HCC): ICD-10-CM

## 2023-04-12 LAB
ALBUMIN SERPL-MCNC: 4.8 G/DL (ref 3.5–5.2)
ALP SERPL-CCNC: 73 U/L (ref 40–130)
ALT SERPL-CCNC: 29 U/L (ref 21–72)
ANION GAP SERPL CALCULATED.3IONS-SCNC: 17 MMOL/L (ref 7–19)
AST SERPL-CCNC: 38 U/L (ref 17–59)
BASOPHILS # BLD: 0.04 K/UL (ref 0.01–0.08)
BASOPHILS NFR BLD: 0.7 % (ref 0.1–1.2)
BILIRUB SERPL-MCNC: 0.5 MG/DL (ref 0.2–1.3)
BUN SERPL-MCNC: 28 MG/DL (ref 9–20)
CALCIUM SERPL-MCNC: 9.8 MG/DL (ref 8.4–10.2)
CHLORIDE SERPL-SCNC: 98 MMOL/L (ref 98–111)
CO2 SERPL-SCNC: 25 MMOL/L (ref 22–29)
CREAT SERPL-MCNC: 1.6 MG/DL (ref 0.6–1.2)
EOSINOPHIL # BLD: 0.13 K/UL (ref 0.04–0.54)
EOSINOPHIL NFR BLD: 2.1 % (ref 0.7–7)
ERYTHROCYTE [DISTWIDTH] IN BLOOD BY AUTOMATED COUNT: 13.5 % (ref 11.6–14.4)
GLUCOSE SERPL-MCNC: 125 MG/DL (ref 74–106)
HCT VFR BLD AUTO: 45.1 % (ref 40.1–51)
HGB BLD-MCNC: 14 G/DL (ref 13.7–17.5)
LDH SERPL-CCNC: 225 U/L (ref 120–246)
LYMPHOCYTES # BLD: 1.53 K/UL (ref 1.18–3.74)
LYMPHOCYTES NFR BLD: 24.9 % (ref 19.3–53.1)
MCH RBC QN AUTO: 32.8 PG (ref 25.7–32.2)
MCHC RBC AUTO-ENTMCNC: 31 G/DL (ref 32.3–36.5)
MCV RBC AUTO: 105.6 FL (ref 79–92.2)
MONOCYTES # BLD: 0.58 K/UL (ref 0.24–0.82)
MONOCYTES NFR BLD: 9.4 % (ref 4.7–12.5)
NEUTROPHILS # BLD: 3.85 K/UL (ref 1.56–6.13)
NEUTS SEG NFR BLD: 62.6 % (ref 34–71.1)
PLATELET # BLD AUTO: 155 K/UL (ref 163–337)
PMV BLD AUTO: 10.2 FL (ref 7.4–10.4)
POTASSIUM SERPL-SCNC: 4.8 MMOL/L (ref 3.5–5.1)
PROT SERPL-MCNC: 8.1 G/DL (ref 6.3–8.2)
RBC # BLD AUTO: 4.27 M/UL (ref 4.63–6.08)
SODIUM SERPL-SCNC: 140 MMOL/L (ref 137–145)
WBC # BLD AUTO: 6.15 K/UL (ref 4.23–9.07)

## 2023-04-12 PROCEDURE — 36415 COLL VENOUS BLD VENIPUNCTURE: CPT

## 2023-04-12 PROCEDURE — 85025 COMPLETE CBC W/AUTO DIFF WBC: CPT

## 2023-04-12 PROCEDURE — 99212 OFFICE O/P EST SF 10 MIN: CPT

## 2023-04-12 PROCEDURE — 83615 LACTATE (LD) (LDH) ENZYME: CPT

## 2023-04-12 PROCEDURE — 80053 COMPREHEN METABOLIC PANEL: CPT

## 2023-05-22 NOTE — TELEPHONE ENCOUNTER
I have attempted to do a PA on Myrbetriq, it has came back saying no PA is needed for this medication. Will send to provider to send in refills.

## 2023-05-23 RX ORDER — MIRABEGRON 50 MG/1
TABLET, FILM COATED, EXTENDED RELEASE ORAL
Qty: 30 TABLET | Refills: 10 | Status: SHIPPED | OUTPATIENT
Start: 2023-05-23

## 2023-09-06 DIAGNOSIS — C61 PROSTATE CANCER (HCC): ICD-10-CM

## 2023-09-06 LAB — PSA SERPL-MCNC: <0.01 NG/ML (ref 0–4)

## 2023-09-08 ENCOUNTER — TELEPHONE (OUTPATIENT)
Dept: HEMATOLOGY | Age: 78
End: 2023-09-08

## 2023-09-08 NOTE — PROGRESS NOTES
MEDICAL ONCOLOGY PROGRESS NOTE                                                          Devante Wagoner   [birth year]  9/11/2023     Chief Complaint   Patient presents with    Follow-up     Prostate cancer         INTERVAL HISTORY/HISTORY OF PRESENT ILLNESS:  Devante Wagoner presents today for a surveillance follow up visit. He has a diagnosis of prostate cancer and also follicular lymphoma stage Ia. In addition, he has been diagnosed with stage 0 bladder cancer in April 2021 and is status post transurethral resection of bladder tumor by urology [facility].   This is a telehealth visit. The patient lives in [place], [place]. He had CT scans performed. He is status post RT for his prostate cancer and also stage Ia follicular lymphoma. The patient denies any new symptoms since last visit other than chronic symptoms of fatigue, muscle pain. This is likely due to a prior diagnosis of [facility] spotted fever patient had a recent labs performed to include a PSA. He also follows with urology for cystoscopy for stage 0 bladder cancer. He has no B symptoms. No new urinary complaints. Denies any hematuria. 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  Stage I A  Noninvasive low-grade papillary urothelial carcinoma, April 2021     Treatment summary   03/24/2020- Retroperitoneal [facility]section of lymphoma at [facility]  7/6/20-7/31/20-IFRT- 27 Gy in 18 fractions to left retroperitoneal lymph node-[facility]/Dr. Daphne Crespo  6/24/20-8/21/20- 78 Gy in 42 fractions for prostate cancer -[facility]/Dr. Daphne Crespo  4/15/21 Bladder cancer-TURBT by Dr. Lenard Jackson      Cancer History:  Mr. Buddy Saenz was first seen by me on 2/21/2020 referred by Dr. Roni Plascencia for further evaluation and

## 2023-09-08 NOTE — TELEPHONE ENCOUNTER
Called pt to remind of appt date and time. Pt voiced understanding.     Electronically signed by Brendan Avalos MA on 9/8/2023 at 3:19 PM

## 2023-09-11 ENCOUNTER — OFFICE VISIT (OUTPATIENT)
Dept: HEMATOLOGY | Age: 78
End: 2023-09-11
Payer: MEDICARE

## 2023-09-11 ENCOUNTER — HOSPITAL ENCOUNTER (OUTPATIENT)
Dept: INFUSION THERAPY | Age: 78
Discharge: HOME | End: 2023-09-11
Payer: MEDICARE

## 2023-09-11 VITALS
OXYGEN SATURATION: 92 % | DIASTOLIC BLOOD PRESSURE: 64 MMHG | WEIGHT: 223.4 LBS | SYSTOLIC BLOOD PRESSURE: 118 MMHG | HEART RATE: 78 BPM | TEMPERATURE: 97.9 F | HEIGHT: 73 IN | BODY MASS INDEX: 29.61 KG/M2

## 2023-09-11 DIAGNOSIS — C61 PROSTATE CANCER (HCC): ICD-10-CM

## 2023-09-11 DIAGNOSIS — C82.03 FOLLICULAR LYMPHOMA GRADE I OF INTRA-ABDOMINAL LYMPH NODES (HCC): ICD-10-CM

## 2023-09-11 DIAGNOSIS — N28.9 RENAL IMPAIRMENT: ICD-10-CM

## 2023-09-11 DIAGNOSIS — Z71.89 CARE PLAN DISCUSSED WITH PATIENT: Primary | ICD-10-CM

## 2023-09-11 LAB
ERYTHROCYTE [DISTWIDTH] IN BLOOD BY AUTOMATED COUNT: 14.7 % (ref 11.6–14.4)
HCT VFR BLD AUTO: 36.2 % (ref 40.1–51)
HGB BLD-MCNC: 12 G/DL (ref 13.7–17.5)
LYMPHOCYTES # BLD: 1.09 K/UL (ref 1.18–3.74)
LYMPHOCYTES NFR BLD: 12 % (ref 19.3–53.1)
MCH RBC QN AUTO: 31.4 PG (ref 25.7–32.2)
MCHC RBC AUTO-ENTMCNC: 33.1 G/DL (ref 32.3–36.5)
MCV RBC AUTO: 94.8 FL (ref 79–92.2)
MONOCYTES # BLD: 0.74 K/UL (ref 0.24–0.82)
MONOCYTES NFR BLD: 8.2 % (ref 4.7–12.5)
NEUTROPHILS # BLD: 7 K/UL (ref 1.56–6.13)
NEUTS SEG NFR BLD: 77.3 % (ref 34–71.1)
PLATELET # BLD AUTO: 151 K/UL (ref 163–337)
PMV BLD AUTO: 10.4 FL (ref 7.4–10.4)
RBC # BLD AUTO: 3.82 M/UL (ref 4.63–6.08)
WBC # BLD AUTO: 9.06 K/UL (ref 4.23–9.07)

## 2023-09-11 PROCEDURE — 1123F ACP DISCUSS/DSCN MKR DOCD: CPT | Performed by: INTERNAL MEDICINE

## 2023-09-11 PROCEDURE — 1036F TOBACCO NON-USER: CPT | Performed by: INTERNAL MEDICINE

## 2023-09-11 PROCEDURE — 99212 OFFICE O/P EST SF 10 MIN: CPT

## 2023-09-11 PROCEDURE — 85025 COMPLETE CBC W/AUTO DIFF WBC: CPT

## 2023-09-11 PROCEDURE — 99213 OFFICE O/P EST LOW 20 MIN: CPT | Performed by: INTERNAL MEDICINE

## 2023-09-11 PROCEDURE — G8427 DOCREV CUR MEDS BY ELIG CLIN: HCPCS | Performed by: INTERNAL MEDICINE

## 2023-09-11 PROCEDURE — G8417 CALC BMI ABV UP PARAM F/U: HCPCS | Performed by: INTERNAL MEDICINE

## 2023-09-11 PROCEDURE — 36415 COLL VENOUS BLD VENIPUNCTURE: CPT

## 2023-09-11 RX ORDER — PREDNISONE 5 MG/1
5 TABLET ORAL DAILY
COMMUNITY
Start: 2023-09-05

## 2023-09-11 RX ORDER — PREGABALIN 100 MG/1
100 CAPSULE ORAL 3 TIMES DAILY
COMMUNITY
Start: 2023-09-05

## 2023-09-11 RX ORDER — ONDANSETRON 4 MG/1
TABLET, ORALLY DISINTEGRATING ORAL
COMMUNITY
Start: 2023-09-02

## 2023-09-27 ENCOUNTER — CLINICAL DOCUMENTATION (OUTPATIENT)
Dept: HEMATOLOGY | Age: 78
End: 2023-09-27

## 2023-09-27 NOTE — PROGRESS NOTES
Received call from [facility] discharge coordinator stating pt has been an inpt there for increase in headaches and double vision. Work-up there with MRI brain that revealed a left orbital lesion and PET scan that confirmed that orbital lesion with an additional mediastinal, mesenteric, retroperitoneal and left pelvic lymphadenopathy. Pt was seen by ophthalmology, who recommended pelvic LN biopsy as it is easier to do than the orbital lesion. A biopsy was completed on the pelvic LN that revealed follicular lymphoma with large B-cell features. Ophthalmology will determine if the orbit will be biopsied prior to discharge. Pt has been started on high dose steroids for 5 days and will be d/c with tapering dose. D/C coordinator is requesting a f/u appt with Dr Eliseo Abrams for further management. Appt sched for 10/3/23. Dr Eliseo Abrams informed of above.

## 2023-09-29 ENCOUNTER — TELEPHONE (OUTPATIENT)
Dept: HEMATOLOGY | Age: 78
End: 2023-09-29

## 2023-09-29 NOTE — TELEPHONE ENCOUNTER
Called patient and reminded pt of appt on 10/03/23. Patient voiced their understanding.         Electronically signed by Risa Doe MA on 9/29/2023 at 3:18 PM

## 2023-10-03 ENCOUNTER — OFFICE VISIT (OUTPATIENT)
Dept: HEMATOLOGY | Age: 78
End: 2023-10-03
Payer: MEDICARE

## 2023-10-03 ENCOUNTER — HOSPITAL ENCOUNTER (OUTPATIENT)
Dept: INFUSION THERAPY | Age: 78
Discharge: HOME | End: 2023-10-03
Payer: MEDICARE

## 2023-10-03 VITALS
BODY MASS INDEX: 28.88 KG/M2 | TEMPERATURE: 98 F | DIASTOLIC BLOOD PRESSURE: 62 MMHG | WEIGHT: 217.9 LBS | HEIGHT: 73 IN | HEART RATE: 76 BPM | SYSTOLIC BLOOD PRESSURE: 108 MMHG

## 2023-10-03 DIAGNOSIS — C61 PROSTATE CANCER (HCC): ICD-10-CM

## 2023-10-03 DIAGNOSIS — Z71.89 COORDINATION OF COMPLEX CARE: ICD-10-CM

## 2023-10-03 DIAGNOSIS — Z85.46 HISTORY OF PROSTATE CANCER: ICD-10-CM

## 2023-10-03 DIAGNOSIS — Z78.9 MEDICALLY COMPLEX PATIENT: ICD-10-CM

## 2023-10-03 DIAGNOSIS — R91.1 PULMONARY NODULE: ICD-10-CM

## 2023-10-03 DIAGNOSIS — Z71.89 CARE PLAN DISCUSSED WITH PATIENT: ICD-10-CM

## 2023-10-03 DIAGNOSIS — R93.0 ABNORMAL X-RAY OF ORBITS: ICD-10-CM

## 2023-10-03 DIAGNOSIS — R54 FRAILTY SYNDROME IN GERIATRIC PATIENT: ICD-10-CM

## 2023-10-03 DIAGNOSIS — C82.03 FOLLICULAR LYMPHOMA GRADE I OF INTRA-ABDOMINAL LYMPH NODES (HCC): Primary | ICD-10-CM

## 2023-10-03 PROCEDURE — G8484 FLU IMMUNIZE NO ADMIN: HCPCS | Performed by: INTERNAL MEDICINE

## 2023-10-03 PROCEDURE — G8417 CALC BMI ABV UP PARAM F/U: HCPCS | Performed by: INTERNAL MEDICINE

## 2023-10-03 PROCEDURE — 1036F TOBACCO NON-USER: CPT | Performed by: INTERNAL MEDICINE

## 2023-10-03 PROCEDURE — 1123F ACP DISCUSS/DSCN MKR DOCD: CPT | Performed by: INTERNAL MEDICINE

## 2023-10-03 PROCEDURE — 99212 OFFICE O/P EST SF 10 MIN: CPT

## 2023-10-03 PROCEDURE — 99215 OFFICE O/P EST HI 40 MIN: CPT | Performed by: INTERNAL MEDICINE

## 2023-10-03 PROCEDURE — G8427 DOCREV CUR MEDS BY ELIG CLIN: HCPCS | Performed by: INTERNAL MEDICINE

## 2023-10-03 RX ORDER — ACETAMINOPHEN 325 MG/1
TABLET ORAL
COMMUNITY
Start: 2023-09-28

## 2023-10-03 RX ORDER — DEXAMETHASONE 4 MG/1
TABLET ORAL
COMMUNITY
Start: 2023-09-28

## 2023-10-03 RX ORDER — GLUCAGON INJECTION, SOLUTION 1 MG/.2ML
INJECTION, SOLUTION SUBCUTANEOUS
COMMUNITY
Start: 2023-09-28

## 2023-10-03 RX ORDER — GABAPENTIN 300 MG/1
CAPSULE ORAL
COMMUNITY
Start: 2023-09-28

## 2023-10-03 RX ORDER — DIVALPROEX SODIUM 500 MG/1
TABLET, DELAYED RELEASE ORAL
COMMUNITY
Start: 2023-09-28

## 2023-10-03 RX ORDER — FERROUS SULFATE 325(65) MG
1 TABLET ORAL DAILY
COMMUNITY
Start: 2023-08-17

## 2023-10-12 DIAGNOSIS — C85.99: ICD-10-CM

## 2023-10-12 DIAGNOSIS — C82.03 FOLLICULAR LYMPHOMA GRADE I OF INTRA-ABDOMINAL LYMPH NODES (HCC): Primary | ICD-10-CM

## 2023-10-12 NOTE — TELEPHONE ENCOUNTER
Referral to Dr. Nguyen Barba in [place].  Electronically signed by Meir Guzman RN on 10/12/2023 at 4:29 PM

## 2023-10-16 DIAGNOSIS — G89.3 CANCER-RELATED PAIN: Primary | ICD-10-CM

## 2023-10-16 RX ORDER — OXYCODONE AND ACETAMINOPHEN 7.5; 325 MG/1; MG/1
1 TABLET ORAL EVERY 6 HOURS PRN
Qty: 120 TABLET | Refills: 0 | Status: SHIPPED | OUTPATIENT
Start: 2023-10-16 | End: 2023-11-15

## 2023-10-16 NOTE — TELEPHONE ENCOUNTER
Patients wife called and states patient is having headaches and has no pain medication to take at this time. I spoke with MD and new orders received for Percocet 7.5 mg every 6 hours. I called and notified patients wife. Medication will be sent to [facility] pharmacy.  Electronically signed by Elizabeth Dominique RN on 10/16/2023 at 2:40 PM

## 2023-12-04 ENCOUNTER — TELEPHONE (OUTPATIENT)
Dept: HEMATOLOGY | Age: 78
End: 2023-12-04

## 2023-12-04 NOTE — TELEPHONE ENCOUNTER
Patient cancelled appt with Dr Danielle Mora because he is going to Dr Yosef Benson at [facility].

## 2024-06-17 RX ORDER — MIRABEGRON 50 MG/1
50 TABLET, FILM COATED, EXTENDED RELEASE ORAL DAILY
Qty: 30 TABLET | Refills: 8 | Status: SHIPPED | OUTPATIENT
Start: 2024-06-17

## 2024-07-24 DIAGNOSIS — C61 PROSTATE CANCER (HCC): ICD-10-CM

## 2024-07-24 LAB — PSA SERPL-MCNC: <0.01 NG/ML (ref 0–4)

## 2024-07-31 ENCOUNTER — PROCEDURE VISIT (OUTPATIENT)
Dept: UROLOGY | Age: 79
End: 2024-07-31
Payer: MEDICARE

## 2024-07-31 DIAGNOSIS — R39.15 URGENCY OF URINATION: ICD-10-CM

## 2024-07-31 DIAGNOSIS — Z85.51 HISTORY OF BLADDER CANCER: ICD-10-CM

## 2024-07-31 DIAGNOSIS — C61 PROSTATE CANCER (HCC): Primary | ICD-10-CM

## 2024-07-31 LAB
APPEARANCE FLUID: CLEAR
BILIRUBIN, POC: NORMAL
BLOOD URINE, POC: NORMAL
CLARITY, POC: CLEAR
COLOR, POC: YELLOW
GLUCOSE URINE, POC: NORMAL
KETONES, POC: NORMAL
LEUKOCYTE EST, POC: NORMAL
NITRITE, POC: NORMAL
PH, POC: 6
POST VOID RESIDUAL (PVR): NORMAL ML
PROTEIN, POC: NORMAL
SPECIFIC GRAVITY, POC: 1.02
UROBILINOGEN, POC: 0.2

## 2024-07-31 PROCEDURE — 51798 US URINE CAPACITY MEASURE: CPT | Performed by: UROLOGY

## 2024-07-31 PROCEDURE — 81002 URINALYSIS NONAUTO W/O SCOPE: CPT | Performed by: UROLOGY

## 2024-07-31 PROCEDURE — 52000 CYSTOURETHROSCOPY: CPT | Performed by: UROLOGY

## 2024-07-31 RX ORDER — TOCILIZUMAB 180 MG/ML
162 INJECTION, SOLUTION SUBCUTANEOUS
COMMUNITY
Start: 2024-06-11

## 2024-12-03 ENCOUNTER — TELEPHONE (OUTPATIENT)
Dept: UROLOGY | Age: 79
End: 2024-12-03

## 2024-12-03 NOTE — TELEPHONE ENCOUNTER
Spoke to patient's wife and let her know that the appointment in December is just for a follow up that a Dr. Helton from [facility] has requested after abnormal CT. I have asked the  to please have that imaging pushed through.

## 2024-12-03 NOTE — TELEPHONE ENCOUNTER
Mary Carmen called to speak with a nurse regarding cysto. Wanting to know if it will be done during appointment on 12/26. Please advise.

## 2024-12-26 ENCOUNTER — OFFICE VISIT (OUTPATIENT)
Dept: UROLOGY | Age: 79
End: 2024-12-26
Payer: MEDICARE

## 2024-12-26 VITALS — WEIGHT: 243.4 LBS | TEMPERATURE: 97.8 F | BODY MASS INDEX: 32.26 KG/M2 | HEIGHT: 73 IN

## 2024-12-26 DIAGNOSIS — R39.15 URGENCY OF URINATION: ICD-10-CM

## 2024-12-26 DIAGNOSIS — Z85.51 HISTORY OF BLADDER CANCER: Primary | ICD-10-CM

## 2024-12-26 DIAGNOSIS — N32.89 BLADDER WALL THICKENING: ICD-10-CM

## 2024-12-26 DIAGNOSIS — C61 PROSTATE CANCER (HCC): ICD-10-CM

## 2024-12-26 LAB
APPEARANCE FLUID: CLEAR
BILIRUBIN, POC: NORMAL
BLOOD URINE, POC: NORMAL
CLARITY, POC: CLEAR
COLOR, POC: YELLOW
GLUCOSE URINE, POC: NORMAL MG/DL
KETONES, POC: NORMAL MG/DL
LEUKOCYTE EST, POC: NORMAL
NITRITE, POC: NORMAL
PH, POC: 5.5
PROTEIN, POC: 30 MG/DL
SPECIFIC GRAVITY, POC: 1.03
UROBILINOGEN, POC: 0.2 MG/DL

## 2024-12-26 PROCEDURE — 81002 URINALYSIS NONAUTO W/O SCOPE: CPT | Performed by: UROLOGY

## 2024-12-26 PROCEDURE — 1036F TOBACCO NON-USER: CPT | Performed by: UROLOGY

## 2024-12-26 PROCEDURE — G8484 FLU IMMUNIZE NO ADMIN: HCPCS | Performed by: UROLOGY

## 2024-12-26 PROCEDURE — G8417 CALC BMI ABV UP PARAM F/U: HCPCS | Performed by: UROLOGY

## 2024-12-26 PROCEDURE — 99214 OFFICE O/P EST MOD 30 MIN: CPT | Performed by: UROLOGY

## 2024-12-26 PROCEDURE — 1123F ACP DISCUSS/DSCN MKR DOCD: CPT | Performed by: UROLOGY

## 2024-12-26 PROCEDURE — 1159F MED LIST DOCD IN RCRD: CPT | Performed by: UROLOGY

## 2024-12-26 PROCEDURE — G8427 DOCREV CUR MEDS BY ELIG CLIN: HCPCS | Performed by: UROLOGY

## 2024-12-26 NOTE — PROGRESS NOTES
disease.    Peritoneum: Within normal limits    Vasculature: Calcified aortic atherosclerotic plaque. No aneurysmal dilation.    Lymph Nodes: Within normal limits    Urinary Bladder: Plaque-like irregular enhancement at the bladder base is of uncertain significance best seen on image 205/3 and on sagittal images there is a trigone. Urinary bladder is underdistended limiting assessment. Area measuring approximately 8  mm greatest thickness. Not visible on prior imaging.    Reproductive organs: Brachytherapy seeds in the prostate.    Abdominal Wall: . Fat-containing LEFT inguinal hernia. Postoperative changes in the LEFT groin related to prior lymphadenectomy.      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 Findings are similar to prior imaging.  Exam End: 11/14/24 09:54    Specimen Collected: 11/14/24 10:01 Last Resulted: 11/14/24 10:15   Received From: [facility]  Result Received: 12/03/24 11:02        1. Bladder wall thickening  Outside x-rays reviewed by me this does show small area of thickening.  Patient will need cystoscopy he was already scheduled for his routine surveillance cystoscopy in February we will see if we can move this up to an earlier date.  - Cytology, Non-Gyn    2. History of bladder cancer  Will send cytology today follow-up for cystoscopy  - POCT Urinalysis no Micro  - Cytology, Non-Gyn  - Cytology, Non-Gyn    3. Prostate cancer (HCC)  PSA undetectable he will be due his follow-up PSA in July 2025  - POCT Urinalysis no Micro    4. Urgency of urination  Unchanged continue Myrbetriq  - POCT Urinalysis no Micro      Orders Placed This Encounter   Procedures    Cytology, Non-Gyn     Order Specific Question:   PREVIOUS BIOPSY     Answer:   Yes     Order Specific Question:   PREOP DIAGNOSIS     Answer:   History of Bladder

## 2025-01-27 ENCOUNTER — PROCEDURE VISIT (OUTPATIENT)
Dept: UROLOGY | Age: 80
End: 2025-01-27
Payer: MEDICARE

## 2025-01-27 VITALS — BODY MASS INDEX: 32.05 KG/M2 | TEMPERATURE: 97.2 F | WEIGHT: 241.8 LBS | HEIGHT: 73 IN

## 2025-01-27 DIAGNOSIS — Z85.51 HISTORY OF BLADDER CANCER: ICD-10-CM

## 2025-01-27 DIAGNOSIS — C61 PROSTATE CANCER (HCC): Primary | ICD-10-CM

## 2025-01-27 DIAGNOSIS — N32.89 BLADDER WALL THICKENING: ICD-10-CM

## 2025-01-27 LAB
APPEARANCE FLUID: CLEAR
BILIRUBIN, POC: NORMAL
BLOOD URINE, POC: NORMAL
CLARITY, POC: CLEAR
COLOR, POC: YELLOW
GLUCOSE URINE, POC: NORMAL MG/DL
KETONES, POC: NORMAL MG/DL
LEUKOCYTE EST, POC: NORMAL
NITRITE, POC: NORMAL
PH, POC: 6
PROTEIN, POC: NORMAL MG/DL
SPECIFIC GRAVITY, POC: 1.03
UROBILINOGEN, POC: 0.2 MG/DL

## 2025-01-27 PROCEDURE — 81002 URINALYSIS NONAUTO W/O SCOPE: CPT | Performed by: UROLOGY

## 2025-01-27 PROCEDURE — 52000 CYSTOURETHROSCOPY: CPT | Performed by: UROLOGY

## 2025-01-27 RX ORDER — CLONAZEPAM 0.5 MG/1
TABLET ORAL
COMMUNITY

## 2025-01-27 RX ORDER — CEPHALEXIN 500 MG/1
500 CAPSULE ORAL 2 TIMES DAILY
COMMUNITY
Start: 2025-01-17

## 2025-01-27 RX ORDER — CEFDINIR 300 MG/1
300 CAPSULE ORAL 2 TIMES DAILY
COMMUNITY
Start: 2024-10-18

## 2025-01-27 RX ORDER — GABAPENTIN 300 MG/1
CAPSULE ORAL
COMMUNITY

## 2025-01-27 RX ORDER — CARIPRAZINE 1.5 MG/1
CAPSULE, GELATIN COATED ORAL
COMMUNITY
Start: 2025-01-14

## 2025-01-27 RX ORDER — ESCITALOPRAM OXALATE 20 MG
TABLET ORAL
COMMUNITY

## 2025-01-27 RX ORDER — APIXABAN 5 MG/1
TABLET, FILM COATED ORAL
COMMUNITY

## 2025-01-27 RX ORDER — ATORVASTATIN CALCIUM 80 MG/1
TABLET, FILM COATED ORAL
COMMUNITY

## 2025-01-27 RX ORDER — LISINOPRIL 10 MG/1
10 TABLET ORAL DAILY
COMMUNITY
Start: 2025-01-14

## 2025-01-27 RX ORDER — ERGOCALCIFEROL 1.25 MG/1
CAPSULE, LIQUID FILLED ORAL
COMMUNITY

## 2025-01-27 NOTE — PROGRESS NOTES
BLADDER CANCER  Patient was first diagnosed with bladder cancer approximately 3.5 years(s) ago.   April 2021  Last Recurrence: He is never had a recurrence.  Initial diagnosis was on 4/16/2021  Stage of bladder cancer at last recurrence: 8130/2 - Papillary transitional cell carcinoma, non-invasive, stage TA  Grade: low grade, low risk  Last urinary cytology/FISH results: negative; Date: 12/26/2024  Hematuria?  None  Last upper tract study: 2 month(s) ago.  Study was a CT done on 11/14/2024 at [place].  This revealed a plaque-like nodular enhancement of the base of the bladder.  He initially was scheduled for surveillance cystoscopy in February we will remove this up to today because of this CT finding of bladder wall thickening      Prostate Cancer  Patient is here today for prostate cancer which was first diagnosed 5 years ago.  His prostate cancer can be characterized as clinical stage T2b [place] 3+4 = 7 grade group 2.  Hormone sensitive with undetectable PSA.  His last several PSA values are as follows:        Lab Results   Component Value Date     PSA <0.01 07/24/2024     PSA <0.01 09/06/2023     PSA <0.01 04/12/2023     PSA <0.01 10/12/2022     PSA <0.01 06/17/2022      Previous treatment of prostate cancer: Neoadjuvant hormonal therapy for 6 months.  His initial 6-month Lupron injection was given in April 2020.  External beam radiation therapy completed 9/10/2020  Lower urinary tract symptoms: urgency and frequency he is on Myrbetriq 50 mg     He is due his next annual PSA in 6 months        Cystoscopy Operative Note (1/27/25)  Surgeon: Denys Costa MD  Anesthesia: Urethral 2% Xylocaine   Indications: History bladder cancer, bladder wall thickening nodular plaque seen on outside CT  Position: Supine    Findings:   The patient was prepped and draped in the usual sterile fashion.  The flexible cystoscope was advanced through the urethra and into the bladder under direct vision.  The bladder was

## 2025-03-25 ENCOUNTER — OFFICE VISIT (OUTPATIENT)
Dept: UROLOGY | Age: 80
End: 2025-03-25
Payer: MEDICARE

## 2025-03-25 VITALS — WEIGHT: 243 LBS | BODY MASS INDEX: 32.2 KG/M2 | HEIGHT: 73 IN | TEMPERATURE: 97.3 F

## 2025-03-25 DIAGNOSIS — Z85.51 HISTORY OF BLADDER CANCER: ICD-10-CM

## 2025-03-25 DIAGNOSIS — R39.15 URGENCY OF URINATION: Primary | ICD-10-CM

## 2025-03-25 DIAGNOSIS — C61 PROSTATE CANCER (HCC): ICD-10-CM

## 2025-03-25 LAB
APPEARANCE FLUID: CLEAR
BILIRUBIN, POC: NORMAL
BLOOD URINE, POC: NORMAL
CLARITY, POC: CLEAR
COLOR, POC: YELLOW
GLUCOSE URINE, POC: NORMAL MG/DL
KETONES, POC: NORMAL MG/DL
LEUKOCYTE EST, POC: NORMAL
NITRITE, POC: NORMAL
PH, POC: 5.5
PROTEIN, POC: NORMAL MG/DL
SPECIFIC GRAVITY, POC: 1.03
UROBILINOGEN, POC: 0.2 MG/DL

## 2025-03-25 PROCEDURE — 99214 OFFICE O/P EST MOD 30 MIN: CPT | Performed by: NURSE PRACTITIONER

## 2025-03-25 PROCEDURE — 51798 US URINE CAPACITY MEASURE: CPT | Performed by: NURSE PRACTITIONER

## 2025-03-25 PROCEDURE — 1159F MED LIST DOCD IN RCRD: CPT | Performed by: NURSE PRACTITIONER

## 2025-03-25 PROCEDURE — G8417 CALC BMI ABV UP PARAM F/U: HCPCS | Performed by: NURSE PRACTITIONER

## 2025-03-25 PROCEDURE — G8427 DOCREV CUR MEDS BY ELIG CLIN: HCPCS | Performed by: NURSE PRACTITIONER

## 2025-03-25 PROCEDURE — 81002 URINALYSIS NONAUTO W/O SCOPE: CPT | Performed by: NURSE PRACTITIONER

## 2025-03-25 PROCEDURE — 1036F TOBACCO NON-USER: CPT | Performed by: NURSE PRACTITIONER

## 2025-03-25 PROCEDURE — 1123F ACP DISCUSS/DSCN MKR DOCD: CPT | Performed by: NURSE PRACTITIONER

## 2025-03-25 RX ORDER — ALENDRONATE SODIUM 70 MG/1
TABLET ORAL
COMMUNITY
Start: 2025-02-27

## 2025-03-25 ASSESSMENT — ENCOUNTER SYMPTOMS
NAUSEA: 0
BACK PAIN: 0
ABDOMINAL PAIN: 0
ABDOMINAL DISTENTION: 0
VOMITING: 0

## 2025-03-25 NOTE — PROGRESS NOTES
Go Rajan is a 79 y.o. male who presents today   Chief Complaint   Patient presents with    Follow-up     I am here today for frequency.  Patient states that he is urinating ever 20-30minutes and is also experiencing urgency.   He states the Myrbetric is no longer helping.        Urinary urgency  Patient with history of urinary urgency however has gotten worse over the last 3 weeks.  History of prostate and bladder cancer followed by Dr. Costa.  He was maintained on Myrbetriq 50 mg and doing well on this however symptoms started to worsen and he ran out of his medication about a week ago.  He is requesting this to be increased.  Denies any dysuria, flank pain, hematuria.  UA today appears negative he is emptying his bladder well with 8 mL PVR.  He has a history of diabetes usually will void small to large amounts.  Also with a history of sleep apnea supposed to wear his CPAP but has had issues with the machine.  Drinks primarily coffee in the morning, some water tea and pop in the afternoons.      Note from Dr. Costa copied over below from 1/27/2025.  BLADDER CANCER  Patient was first diagnosed with bladder cancer approximately 3.5 years(s) ago.   April 2021  Last Recurrence: He is never had a recurrence.  Initial diagnosis was on 4/16/2021  Stage of bladder cancer at last recurrence: 8130/2 - Papillary transitional cell carcinoma, non-invasive, stage TA  Grade: low grade, low risk  Last urinary cytology/FISH results: negative; Date: 12/26/2024  Hematuria?  None  Last upper tract study: 2 month(s) ago.  Study was a CT done on 11/14/2024 at [place].  This revealed a plaque-like nodular enhancement of the base of the bladder.  He initially was scheduled for surveillance cystoscopy in February we will remove this up to today because of this CT finding of bladder wall thickening        Prostate Cancer  Patient is here today for prostate cancer which was first diagnosed 5 years ago.  His prostate cancer can

## 2025-04-30 ENCOUNTER — CLINICAL SUPPORT (OUTPATIENT)
Dept: UROLOGY | Age: 80
End: 2025-04-30
Payer: MEDICARE

## 2025-04-30 DIAGNOSIS — R39.15 URGENCY OF URINATION: Primary | ICD-10-CM

## 2025-04-30 DIAGNOSIS — Z85.51 HISTORY OF BLADDER CANCER: ICD-10-CM

## 2025-04-30 DIAGNOSIS — C61 PROSTATE CANCER (HCC): ICD-10-CM

## 2025-04-30 DIAGNOSIS — N32.89 BLADDER WALL THICKENING: ICD-10-CM

## 2025-04-30 PROCEDURE — 51798 US URINE CAPACITY MEASURE: CPT | Performed by: NURSE PRACTITIONER

## 2025-07-14 ENCOUNTER — APPOINTMENT (OUTPATIENT)
Dept: GENERAL RADIOLOGY | Age: 80
DRG: 842 | End: 2025-07-14
Payer: MEDICARE

## 2025-07-14 ENCOUNTER — HOSPITAL ENCOUNTER (INPATIENT)
Age: 80
LOS: 2 days | Discharge: HOME | DRG: 842 | End: 2025-07-16
Attending: EMERGENCY MEDICINE | Admitting: HOSPITALIST
Payer: MEDICARE

## 2025-07-14 ENCOUNTER — APPOINTMENT (OUTPATIENT)
Dept: CT IMAGING | Age: 80
DRG: 842 | End: 2025-07-14
Payer: MEDICARE

## 2025-07-14 DIAGNOSIS — A41.9 SEPSIS WITHOUT ACUTE ORGAN DYSFUNCTION, DUE TO UNSPECIFIED ORGANISM (HCC): Primary | ICD-10-CM

## 2025-07-14 DIAGNOSIS — R10.84 GENERALIZED ABDOMINAL PAIN: ICD-10-CM

## 2025-07-14 DIAGNOSIS — R00.2 PALPITATIONS: ICD-10-CM

## 2025-07-14 PROBLEM — R10.9 ABDOMINAL PAIN: Status: ACTIVE | Noted: 2025-07-14

## 2025-07-14 PROBLEM — E11.9 DIABETES (HCC): Status: ACTIVE | Noted: 2025-07-14

## 2025-07-14 PROBLEM — I10 HTN (HYPERTENSION): Status: ACTIVE | Noted: 2025-07-14

## 2025-07-14 PROBLEM — Z86.718 HISTORY OF DVT OF LOWER EXTREMITY: Status: ACTIVE | Noted: 2025-07-14

## 2025-07-14 LAB
ALBUMIN SERPL-MCNC: 4.5 G/DL (ref 3.5–5.2)
ALP SERPL-CCNC: 55 U/L (ref 40–129)
ALT SERPL-CCNC: 72 U/L (ref 10–50)
ANION GAP SERPL CALCULATED.3IONS-SCNC: 15 MMOL/L (ref 8–16)
AST SERPL-CCNC: 65 U/L (ref 10–50)
B PARAP IS1001 DNA NPH QL NAA+NON-PROBE: NOT DETECTED
B PERT.PT PRMT NPH QL NAA+NON-PROBE: NOT DETECTED
BACTERIA URNS QL MICRO: NEGATIVE /HPF
BASOPHILS # BLD: 0 K/UL (ref 0–0.2)
BASOPHILS NFR BLD: 0.7 % (ref 0–1)
BILIRUB SERPL-MCNC: 0.5 MG/DL (ref 0.2–1.2)
BILIRUB UR QL STRIP: NEGATIVE
BUN SERPL-MCNC: 20 MG/DL (ref 8–23)
C PNEUM DNA NPH QL NAA+NON-PROBE: NOT DETECTED
CALCIUM SERPL-MCNC: 9.4 MG/DL (ref 8.8–10.2)
CHLORIDE SERPL-SCNC: 102 MMOL/L (ref 98–107)
CLARITY UR: CLEAR
CO2 SERPL-SCNC: 21 MMOL/L (ref 22–29)
COLOR UR: YELLOW
CREAT SERPL-MCNC: 1.1 MG/DL (ref 0.7–1.2)
CRYSTALS URNS MICRO: NORMAL /HPF
EOSINOPHIL # BLD: 0.1 K/UL (ref 0–0.6)
EOSINOPHIL NFR BLD: 2.1 % (ref 0–5)
EPI CELLS #/AREA URNS AUTO: 8 /HPF (ref 0–5)
ERYTHROCYTE [DISTWIDTH] IN BLOOD BY AUTOMATED COUNT: 12.8 % (ref 11.5–14.5)
FLUAV RNA NPH QL NAA+NON-PROBE: NOT DETECTED
FLUBV RNA NPH QL NAA+NON-PROBE: NOT DETECTED
GLUCOSE BLD-MCNC: 120 MG/DL (ref 70–99)
GLUCOSE BLD-MCNC: 177 MG/DL (ref 70–99)
GLUCOSE SERPL-MCNC: 163 MG/DL (ref 70–99)
GLUCOSE UR STRIP.AUTO-MCNC: NEGATIVE MG/DL
HADV DNA NPH QL NAA+NON-PROBE: NOT DETECTED
HCOV 229E RNA NPH QL NAA+NON-PROBE: NOT DETECTED
HCOV HKU1 RNA NPH QL NAA+NON-PROBE: NOT DETECTED
HCOV NL63 RNA NPH QL NAA+NON-PROBE: NOT DETECTED
HCOV OC43 RNA NPH QL NAA+NON-PROBE: NOT DETECTED
HCT VFR BLD AUTO: 42.7 % (ref 42–52)
HGB BLD-MCNC: 14.8 G/DL (ref 14–18)
HGB UR STRIP.AUTO-MCNC: NEGATIVE MG/L
HMPV RNA NPH QL NAA+NON-PROBE: NOT DETECTED
HPIV1 RNA NPH QL NAA+NON-PROBE: NOT DETECTED
HPIV2 RNA NPH QL NAA+NON-PROBE: NOT DETECTED
HPIV3 RNA NPH QL NAA+NON-PROBE: NOT DETECTED
HPIV4 RNA NPH QL NAA+NON-PROBE: NOT DETECTED
HYALINE CASTS #/AREA URNS AUTO: 1 /HPF (ref 0–8)
IMM GRANULOCYTES # BLD: 0 K/UL
KETONES UR STRIP.AUTO-MCNC: NEGATIVE MG/DL
LACTATE BLDV-SCNC: 2 MMOL/L (ref 0.5–1.9)
LACTATE BLDV-SCNC: 2.2 MG/DL (ref 0.5–1.9)
LACTATE BLDV-SCNC: 3.6 MG/DL (ref 0.5–1.9)
LEUKOCYTE ESTERASE UR QL STRIP.AUTO: ABNORMAL
LYMPHOCYTES # BLD: 1.3 K/UL (ref 1.1–4.5)
LYMPHOCYTES NFR BLD: 44.1 % (ref 20–40)
M PNEUMO DNA NPH QL NAA+NON-PROBE: NOT DETECTED
MCH RBC QN AUTO: 34.2 PG (ref 27–31)
MCHC RBC AUTO-ENTMCNC: 34.7 G/DL (ref 33–37)
MCV RBC AUTO: 98.6 FL (ref 80–94)
MONOCYTES # BLD: 0.5 K/UL (ref 0–0.9)
MONOCYTES NFR BLD: 15.6 % (ref 0–10)
NEUTROPHILS # BLD: 1.1 K/UL (ref 1.5–7.5)
NEUTS SEG NFR BLD: 37.2 % (ref 50–65)
NITRITE UR QL STRIP.AUTO: NEGATIVE
PERFORMED ON: ABNORMAL
PERFORMED ON: ABNORMAL
PH UR STRIP.AUTO: 6.5 [PH] (ref 5–8)
PLATELET # BLD AUTO: 150 K/UL (ref 130–400)
PMV BLD AUTO: 9.8 FL (ref 9.4–12.4)
POTASSIUM SERPL-SCNC: 4.1 MMOL/L (ref 3.5–5.1)
PROT SERPL-MCNC: 6.3 G/DL (ref 6.4–8.3)
PROT UR STRIP.AUTO-MCNC: NEGATIVE MG/DL
RBC # BLD AUTO: 4.33 M/UL (ref 4.7–6.1)
RBC #/AREA URNS AUTO: 2 /HPF (ref 0–4)
RSV RNA NPH QL NAA+NON-PROBE: NOT DETECTED
RV+EV RNA NPH QL NAA+NON-PROBE: NOT DETECTED
SARS-COV-2 RNA NPH QL NAA+NON-PROBE: NOT DETECTED
SODIUM SERPL-SCNC: 138 MMOL/L (ref 136–145)
SP GR UR STRIP.AUTO: 1.02 (ref 1–1.03)
UROBILINOGEN UR STRIP.AUTO-MCNC: 1 E.U./DL
WBC # BLD AUTO: 2.9 K/UL (ref 4.8–10.8)
WBC #/AREA URNS AUTO: 1 /HPF (ref 0–5)

## 2025-07-14 PROCEDURE — 71045 X-RAY EXAM CHEST 1 VIEW: CPT

## 2025-07-14 PROCEDURE — 85025 COMPLETE CBC W/AUTO DIFF WBC: CPT

## 2025-07-14 PROCEDURE — 1200000000 HC SEMI PRIVATE

## 2025-07-14 PROCEDURE — 6370000000 HC RX 637 (ALT 250 FOR IP): Performed by: NURSE PRACTITIONER

## 2025-07-14 PROCEDURE — 94760 N-INVAS EAR/PLS OXIMETRY 1: CPT

## 2025-07-14 PROCEDURE — 74177 CT ABD & PELVIS W/CONTRAST: CPT

## 2025-07-14 PROCEDURE — 82962 GLUCOSE BLOOD TEST: CPT

## 2025-07-14 PROCEDURE — 99285 EMERGENCY DEPT VISIT HI MDM: CPT

## 2025-07-14 PROCEDURE — 6360000004 HC RX CONTRAST MEDICATION: Performed by: EMERGENCY MEDICINE

## 2025-07-14 PROCEDURE — 2580000003 HC RX 258: Performed by: EMERGENCY MEDICINE

## 2025-07-14 PROCEDURE — 80053 COMPREHEN METABOLIC PANEL: CPT

## 2025-07-14 PROCEDURE — 6360000002 HC RX W HCPCS: Performed by: EMERGENCY MEDICINE

## 2025-07-14 PROCEDURE — 0202U NFCT DS 22 TRGT SARS-COV-2: CPT

## 2025-07-14 PROCEDURE — 36415 COLL VENOUS BLD VENIPUNCTURE: CPT

## 2025-07-14 PROCEDURE — 83605 ASSAY OF LACTIC ACID: CPT

## 2025-07-14 PROCEDURE — 93005 ELECTROCARDIOGRAM TRACING: CPT | Performed by: EMERGENCY MEDICINE

## 2025-07-14 PROCEDURE — 2580000003 HC RX 258: Performed by: NURSE PRACTITIONER

## 2025-07-14 PROCEDURE — 96365 THER/PROPH/DIAG IV INF INIT: CPT

## 2025-07-14 PROCEDURE — 87086 URINE CULTURE/COLONY COUNT: CPT

## 2025-07-14 PROCEDURE — 87040 BLOOD CULTURE FOR BACTERIA: CPT

## 2025-07-14 PROCEDURE — 81001 URINALYSIS AUTO W/SCOPE: CPT

## 2025-07-14 PROCEDURE — 2500000003 HC RX 250 WO HCPCS: Performed by: NURSE PRACTITIONER

## 2025-07-14 RX ORDER — CETIRIZINE HYDROCHLORIDE 10 MG/1
10 TABLET ORAL DAILY
Status: DISCONTINUED | OUTPATIENT
Start: 2025-07-14 | End: 2025-07-16 | Stop reason: HOSPADM

## 2025-07-14 RX ORDER — GLUCAGON 1 MG/ML
1 KIT INJECTION PRN
Status: DISCONTINUED | OUTPATIENT
Start: 2025-07-14 | End: 2025-07-16 | Stop reason: HOSPADM

## 2025-07-14 RX ORDER — SODIUM CHLORIDE 9 MG/ML
INJECTION, SOLUTION INTRAVENOUS PRN
Status: DISCONTINUED | OUTPATIENT
Start: 2025-07-14 | End: 2025-07-16 | Stop reason: HOSPADM

## 2025-07-14 RX ORDER — POLYETHYLENE GLYCOL 3350 17 G/17G
17 POWDER, FOR SOLUTION ORAL DAILY PRN
Status: DISCONTINUED | OUTPATIENT
Start: 2025-07-14 | End: 2025-07-16 | Stop reason: HOSPADM

## 2025-07-14 RX ORDER — POTASSIUM CHLORIDE 7.45 MG/ML
10 INJECTION INTRAVENOUS PRN
Status: DISCONTINUED | OUTPATIENT
Start: 2025-07-14 | End: 2025-07-16 | Stop reason: HOSPADM

## 2025-07-14 RX ORDER — PANTOPRAZOLE SODIUM 40 MG/1
40 TABLET, DELAYED RELEASE ORAL DAILY
Status: DISCONTINUED | OUTPATIENT
Start: 2025-07-14 | End: 2025-07-16 | Stop reason: HOSPADM

## 2025-07-14 RX ORDER — ACETAMINOPHEN 325 MG/1
650 TABLET ORAL EVERY 6 HOURS PRN
Status: DISCONTINUED | OUTPATIENT
Start: 2025-07-14 | End: 2025-07-14 | Stop reason: ALTCHOICE

## 2025-07-14 RX ORDER — GABAPENTIN 300 MG/1
300 CAPSULE ORAL 3 TIMES DAILY
Status: DISCONTINUED | OUTPATIENT
Start: 2025-07-14 | End: 2025-07-16 | Stop reason: HOSPADM

## 2025-07-14 RX ORDER — ENOXAPARIN SODIUM 100 MG/ML
40 INJECTION SUBCUTANEOUS DAILY
Status: CANCELLED | OUTPATIENT
Start: 2025-07-14

## 2025-07-14 RX ORDER — INSULIN LISPRO 100 [IU]/ML
0-4 INJECTION, SOLUTION INTRAVENOUS; SUBCUTANEOUS
Status: DISCONTINUED | OUTPATIENT
Start: 2025-07-14 | End: 2025-07-16 | Stop reason: HOSPADM

## 2025-07-14 RX ORDER — IOPAMIDOL 755 MG/ML
90 INJECTION, SOLUTION INTRAVASCULAR
Status: COMPLETED | OUTPATIENT
Start: 2025-07-14 | End: 2025-07-14

## 2025-07-14 RX ORDER — DEXTROSE MONOHYDRATE 100 MG/ML
INJECTION, SOLUTION INTRAVENOUS CONTINUOUS PRN
Status: DISCONTINUED | OUTPATIENT
Start: 2025-07-14 | End: 2025-07-16 | Stop reason: HOSPADM

## 2025-07-14 RX ORDER — CLONAZEPAM 1 MG/1
0.5 TABLET ORAL 2 TIMES DAILY PRN
Status: DISCONTINUED | OUTPATIENT
Start: 2025-07-14 | End: 2025-07-16 | Stop reason: HOSPADM

## 2025-07-14 RX ORDER — 0.9 % SODIUM CHLORIDE 0.9 %
1000 INTRAVENOUS SOLUTION INTRAVENOUS ONCE
Status: COMPLETED | OUTPATIENT
Start: 2025-07-14 | End: 2025-07-14

## 2025-07-14 RX ORDER — TROSPIUM CHLORIDE 20 MG/1
20 TABLET, FILM COATED ORAL NIGHTLY
Status: DISCONTINUED | OUTPATIENT
Start: 2025-07-14 | End: 2025-07-16 | Stop reason: HOSPADM

## 2025-07-14 RX ORDER — SODIUM CHLORIDE 9 MG/ML
INJECTION, SOLUTION INTRAVENOUS CONTINUOUS
Status: ACTIVE | OUTPATIENT
Start: 2025-07-14 | End: 2025-07-15

## 2025-07-14 RX ORDER — ACETAMINOPHEN 325 MG/1
325 TABLET ORAL EVERY 6 HOURS PRN
Status: DISCONTINUED | OUTPATIENT
Start: 2025-07-14 | End: 2025-07-16 | Stop reason: HOSPADM

## 2025-07-14 RX ORDER — ONDANSETRON 4 MG/1
4 TABLET, ORALLY DISINTEGRATING ORAL EVERY 8 HOURS PRN
Status: DISCONTINUED | OUTPATIENT
Start: 2025-07-14 | End: 2025-07-16 | Stop reason: HOSPADM

## 2025-07-14 RX ORDER — SODIUM CHLORIDE 0.9 % (FLUSH) 0.9 %
5-40 SYRINGE (ML) INJECTION EVERY 12 HOURS SCHEDULED
Status: DISCONTINUED | OUTPATIENT
Start: 2025-07-14 | End: 2025-07-16 | Stop reason: HOSPADM

## 2025-07-14 RX ORDER — SODIUM CHLORIDE 0.9 % (FLUSH) 0.9 %
5-40 SYRINGE (ML) INJECTION PRN
Status: DISCONTINUED | OUTPATIENT
Start: 2025-07-14 | End: 2025-07-16 | Stop reason: HOSPADM

## 2025-07-14 RX ORDER — ONDANSETRON 2 MG/ML
4 INJECTION INTRAMUSCULAR; INTRAVENOUS EVERY 6 HOURS PRN
Status: DISCONTINUED | OUTPATIENT
Start: 2025-07-14 | End: 2025-07-16 | Stop reason: HOSPADM

## 2025-07-14 RX ORDER — ACETAMINOPHEN 650 MG/1
650 SUPPOSITORY RECTAL EVERY 6 HOURS PRN
Status: DISCONTINUED | OUTPATIENT
Start: 2025-07-14 | End: 2025-07-16 | Stop reason: HOSPADM

## 2025-07-14 RX ORDER — LISINOPRIL 10 MG/1
10 TABLET ORAL DAILY
Status: DISCONTINUED | OUTPATIENT
Start: 2025-07-14 | End: 2025-07-16 | Stop reason: HOSPADM

## 2025-07-14 RX ORDER — MAGNESIUM SULFATE IN WATER 40 MG/ML
2000 INJECTION, SOLUTION INTRAVENOUS PRN
Status: DISCONTINUED | OUTPATIENT
Start: 2025-07-14 | End: 2025-07-16 | Stop reason: HOSPADM

## 2025-07-14 RX ORDER — ATORVASTATIN CALCIUM 80 MG/1
80 TABLET, FILM COATED ORAL NIGHTLY
Status: DISCONTINUED | OUTPATIENT
Start: 2025-07-14 | End: 2025-07-16 | Stop reason: HOSPADM

## 2025-07-14 RX ADMIN — LISINOPRIL 10 MG: 10 TABLET ORAL at 13:00

## 2025-07-14 RX ADMIN — ATORVASTATIN CALCIUM 80 MG: 80 TABLET, FILM COATED ORAL at 20:37

## 2025-07-14 RX ADMIN — APIXABAN 5 MG: 5 TABLET, FILM COATED ORAL at 13:00

## 2025-07-14 RX ADMIN — CEFEPIME 2000 MG: 2 INJECTION, POWDER, FOR SOLUTION INTRAVENOUS at 04:58

## 2025-07-14 RX ADMIN — SODIUM CHLORIDE: 0.9 INJECTION, SOLUTION INTRAVENOUS at 12:59

## 2025-07-14 RX ADMIN — IOPAMIDOL 90 ML: 755 INJECTION, SOLUTION INTRAVENOUS at 06:32

## 2025-07-14 RX ADMIN — CETIRIZINE HYDROCHLORIDE 10 MG: 10 TABLET, FILM COATED ORAL at 13:00

## 2025-07-14 RX ADMIN — PANTOPRAZOLE SODIUM 40 MG: 40 TABLET, DELAYED RELEASE ORAL at 13:00

## 2025-07-14 RX ADMIN — APIXABAN 5 MG: 5 TABLET, FILM COATED ORAL at 20:37

## 2025-07-14 RX ADMIN — SODIUM CHLORIDE 1000 ML: 0.9 INJECTION, SOLUTION INTRAVENOUS at 04:59

## 2025-07-14 RX ADMIN — TROSPIUM CHLORIDE 20 MG: 20 TABLET, FILM COATED ORAL at 20:38

## 2025-07-14 RX ADMIN — GABAPENTIN 300 MG: 300 CAPSULE ORAL at 13:01

## 2025-07-14 RX ADMIN — GABAPENTIN 300 MG: 300 CAPSULE ORAL at 20:37

## 2025-07-14 RX ADMIN — SODIUM CHLORIDE, PRESERVATIVE FREE 10 ML: 5 INJECTION INTRAVENOUS at 20:38

## 2025-07-14 ASSESSMENT — ENCOUNTER SYMPTOMS
COUGH: 0
SHORTNESS OF BREATH: 0
DIARRHEA: 0
ABDOMINAL PAIN: 1
VOMITING: 0
NAUSEA: 0
ABDOMINAL DISTENTION: 0

## 2025-07-14 ASSESSMENT — PAIN SCALES - GENERAL: PAINLEVEL_OUTOF10: 0

## 2025-07-14 NOTE — ED NOTES
ED TO INPATIENT SBAR HANDOFF    Patient Name: Go Rajan   : 1945  79 y.o.   Family/Caregiver Present: No  Code Status Order: Full Code    C-SSRS: Risk of Suicide: No Risk  Sitter No  Restraints:         Situation  Chief Complaint:   Chief Complaint   Patient presents with    Cold Symptoms    Abdominal Pain     States he is unable to void, unable to tell me the last time he voided.      Patient Diagnosis: Abdominal pain [R10.9]     Brief Description of Patient's Condition: Pt arrived from home c/o cold like symtoms and unable to void.  Pt  Has been voiding while in ED.  Sepsis bolus and antibiotics have been given.  Pt is resting comfortably.  Pt is able to get up to the restroom on his own.  Pt is alert and oriented x4. Pt VSS.  Pt wife went home to rest and will return later today.        Mental Status: oriented, alert, coherent, logical, thought processes intact, and able to concentrate and follow conversation  Arrived from: home    Imaging:   CT ABDOMEN PELVIS W IV CONTRAST Additional Contrast? None   Final Result       Hiatal hernia.       Stable benign hypoattenuating lesion in the spleen.       Diverticulosis without diverticulitis       Small periumbilical hernia containing non incarcerated small bowel        All CT scans are performed using dose optimization techniques as appropriate to the performed exam and include    at least one of the following: Automated exposure control, adjustment of the mA and/or kV according to size, and the use of iterative reconstruction technique.        ______________________________________    Electronically signed by: AYDEN ANGEL M.D.   Date:     2025   Time:    06:35       XR CHEST PORTABLE   Final Result   FINDINGS / impression::  The heart is normal in size.  Aortic arch is atherosclerotic.  The lungs are hyperinflated.  There is no consolidation or effusion.  There are no acute osseous abnormalities.

## 2025-07-14 NOTE — PROGRESS NOTES
Go Rajan arrived to room # 428.   Presented with: abdominal pain  Mental Status: Patient is oriented, alert, coherent, and logical.   Vitals:    07/14/25 1129   BP: 137/60   Pulse: 65   Resp: 16   Temp: 97.7 °F (36.5 °C)   SpO2: 97%     Patient safety contract and falls prevention contract reviewed with patient Yes.  Oriented Patient to room.  Call light within reach. Yes.  Needs, issues or concerns expressed at this time: no.      Electronically signed by Tanvi Marinelli RN on 7/14/2025 at 2:39 PM

## 2025-07-14 NOTE — ED PROVIDER NOTES
[facility] EMERGENCY DEPARTMENT  eMERGENCY dEPARTMENT eNCOUnter      Pt Name: Go Rajan  MRN: [id number]  Birthdate 1945  Date of evaluation: 7/14/2025  Provider: Omer Porras MD    CHIEF COMPLAINT       Chief Complaint   Patient presents with    Cold Symptoms    Abdominal Pain     States he is unable to void, unable to tell me the last time he voided.          HISTORY OF PRESENT ILLNESS   (Location/Symptom, Timing/Onset,Context/Setting, Quality, Duration, Modifying Factors, Severity)  Note limiting factors.   Go Rajan is a 79 y.o. male who presents to the emergency department for evaluation regarding lower abdominal pain.  States that the symptoms been ongoing for the past several days.  He admits to feelings of generalized malaise, weakness and some bodyaches.  States that he feels that he has had difficulty urinating.  He has not noticed any blood in his urine.  Patient does have a prior history of prostate cancer and has previously been evaluated by Dr. Costa.  Patient also has a prior history of follicular lymphoma and follows with the oncology team at [facility].  He has not had any episodes of vomiting or diarrhea.  Denies recent oral antibiotics.    HPI    NursingNotes were reviewed.    REVIEW OF SYSTEMS    (2-9 systems for level 4, 10 or more for level 5)     Review of Systems   Constitutional:  Positive for appetite change and fatigue. Negative for fever.   HENT:  Negative for congestion.    Respiratory:  Negative for cough and shortness of breath.    Cardiovascular:  Negative for chest pain and palpitations.   Gastrointestinal:  Positive for abdominal pain. Negative for abdominal distention, diarrhea, nausea and vomiting.   Neurological:  Negative for dizziness and syncope.   All other systems reviewed and are negative.           PAST MEDICALHISTORY     Past Medical History:   Diagnosis Date    Bladder cancer (HCC)     Diabetes mellitus (HCC)     Elevated PSA     Hypertension  limits    Narrative:     CALL  [facility] tel. ,  Chemistry results called to and read back by SHRUTI JORDANRN/ER, 07/14/2025  06:08, by RAYNA   RESPIRATORY PANEL, MOLECULAR, WITH COVID-19   CULTURE, BLOOD 2   CULTURE, BLOOD 1   CULTURE, URINE   MICROSCOPIC URINALYSIS   LACTIC ACID       All other labs were within normal range or not returned as of this dictation.    EMERGENCY DEPARTMENT COURSE and DIFFERENTIAL DIAGNOSIS/MDM:   Vitals:    Vitals:    07/14/25 0532 07/14/25 0601 07/14/25 0602 07/14/25 0642   BP: 108/65 (!) 112/53  (!) 125/53   Pulse: 68 69  73   Resp: 14 15  16   Temp:       TempSrc:       SpO2: 92%  94%    Weight:           MDM    I have independent reviewed patient's laboratory studies, radiographic imaging along with prior records is contained within the EMR.  Initial lactic acid is elevated at 3.6.  WBC count is low at 2.9.  His electrolytes look okay.  Creatinine 1.1.  Urinalysis unremarkable for evidence of acute infection.  Bladder scan reveals about 150 cc of urine in the bladder without evidence of acute urinary retention.  Patient has received a dose of broad-spectrum IV antibiotics with IV cefepime given elevated lactic acid.  CT scan of the abdomen pelvis does not demonstrate any acute intra-abdominal pathology.  Will plan to admit patient to the hospitalist service for observation and further evaluation and management.    Case was discussed with Ilsa regarding admission to the hospitalist service.      Unless otherwise noted below, none     Procedures    FINAL IMPRESSION      1. Sepsis without acute organ dysfunction, due to unspecified organism (HCC)    2. Generalized abdominal pain          DISPOSITION/PLAN   DISPOSITION Decision To Admit 07/14/2025 06:59:16 AM   DISPOSITION CONDITION Stable       (Please note that portions of this note were completed with a voice recognition program.  Efforts were made to edit the dictations but occasionally words are mis-transcribed.)    Omer

## 2025-07-14 NOTE — PROGRESS NOTES
4 Eyes Skin Assessment     NAME:  Go Rajan  YOB: 1945  MEDICAL RECORD NUMBER:  [id number]    The patient is being assessed for  {Reason for Assessment:22795}    I agree that at least one RN has performed a thorough Head to Toe Skin Assessment on the patient. ALL assessment sites listed below have been assessed.      Areas assessed by both nurses:    {Pressure Areas Assessed:15506}        Does the Patient have a Wound? No noted wound(s)       Nelson Prevention initiated by RN: No  Wound Care Orders initiated by RN: No    Pressure Injury (Stage 1,2,3,4, Unstageable, DTI, NWPT, and Complex wounds) if present, place Wound referral order by RN under : No    New Ostomies, if present place, Ostomy referral order under : No     Nurse 1 eSignature: Electronically signed by Tanvi Marinelli RN on 7/14/25 at 2:40 PM CDT    **SHARE this note so that the co-signing nurse can place an eSignature**    Nurse 2 eSignature: {Esignature:[id number]}

## 2025-07-14 NOTE — H&P
[address] [place] [address]    DEPARTMENT OF HOSPITALIST MEDICINE        HISTORY & PHYSICAL:          REASON FOR ADMISSION:  Chief Complaint   Patient presents with    Cold Symptoms    Abdominal Pain     States he is unable to void, unable to tell me the last time he voided.         HISTORY OF PRESENT ILLNESS:  Go Rajan is an 79 y.o. male with PMH listed below.  Pt presented to the ED with complaint of chills, fever and suprapubic abd pain.  States he felt like he had a UTI.   Symptoms presented 3 days ago.  ER eval.  Na 138, k 4.1, Bun 20, creat 1.1, lactic3.6, 2.2 repeat 2.0. glucose 163.  WBC 2.p Hgb 14.8, hct 42.7, plt 150.  Urine unremrkable.  CT abd  unremarkable for acute findings.  CXR Nl.      PAST MEDICAL HISTORY:  Past Medical History:   Diagnosis Date    Bladder cancer (HCC)     Diabetes mellitus (HCC)     Elevated PSA     Hypertension     Kidney stone     Lymphoma (HCC)     Prostate cancer (HCC)          PAST SURGICAL HISTORY:  Past Surgical History:   Procedure Laterality Date    ABDOMINAL HERNIA REPAIR      middle of abdomen. Dr. Herrera in [place]    APPENDECTOMY      CYSTOSCOPY N/A 04/15/2021    CYSTOSCOPY, BILATERAL URETERAL CATHETERIZATIONS WITH RETROGRADES, URETHRAL DILATATION, CLOT EVACUATION AND FULGURATION OF BLEEDING, TRANSURETHRAL RESECTION OF BLADDER TUMOR performed by Denys Costa MD at [facility] OR    JOINT REPLACEMENT  2014    LAPAROTOMY      Resection of retroperitoneal lymphadenopathy    SHOULDER SURGERY  2016    VASECTOMY  1979        SOCIAL HISTORY:  Social History     Socioeconomic History    Marital status:      Spouse name: None    Number of children: None    Years of education: None    Highest education level: None   Tobacco Use    Smoking status: Never    Smokeless tobacco: Never   Vaping Use    Vaping status: Never Used   Substance and Sexual Activity    Alcohol use: Never    Drug use: Never     Social Drivers of Health      Received from [facility]

## 2025-07-15 PROBLEM — Z87.39 HISTORY OF GIANT CELL ARTERITIS: Status: ACTIVE | Noted: 2025-07-15

## 2025-07-15 LAB
ALBUMIN SERPL-MCNC: 3.7 G/DL (ref 3.5–5.2)
ALP SERPL-CCNC: 41 U/L (ref 40–129)
ALT SERPL-CCNC: 63 U/L (ref 10–50)
ANION GAP SERPL CALCULATED.3IONS-SCNC: 11 MMOL/L (ref 8–16)
AST SERPL-CCNC: 64 U/L (ref 10–50)
BASOPHILS # BLD: 0 K/UL (ref 0–0.2)
BASOPHILS NFR BLD: 0 % (ref 0–1)
BILIRUB SERPL-MCNC: 0.3 MG/DL (ref 0.2–1.2)
BUN SERPL-MCNC: 17 MG/DL (ref 8–23)
CALCIUM SERPL-MCNC: 8.2 MG/DL (ref 8.8–10.2)
CHLORIDE SERPL-SCNC: 107 MMOL/L (ref 98–107)
CO2 SERPL-SCNC: 22 MMOL/L (ref 22–29)
CREAT SERPL-MCNC: 1.3 MG/DL (ref 0.7–1.2)
EKG P AXIS: NORMAL DEGREES
EKG P-R INTERVAL: NORMAL MS
EKG Q-T INTERVAL: 328 MS
EKG QRS DURATION: 86 MS
EKG QTC CALCULATION (BAZETT): 417 MS
EKG T AXIS: -29 DEGREES
EOSINOPHIL # BLD: 0.08 K/UL (ref 0–0.6)
EOSINOPHIL NFR BLD: 4 % (ref 0–5)
ERYTHROCYTE [DISTWIDTH] IN BLOOD BY AUTOMATED COUNT: 13.1 % (ref 11.5–14.5)
FOLATE SERPL-MCNC: 4.2 NG/ML (ref 4.5–32.2)
GLUCOSE BLD-MCNC: 131 MG/DL (ref 70–99)
GLUCOSE BLD-MCNC: 138 MG/DL (ref 70–99)
GLUCOSE BLD-MCNC: 139 MG/DL (ref 70–99)
GLUCOSE BLD-MCNC: 165 MG/DL (ref 70–99)
GLUCOSE SERPL-MCNC: 129 MG/DL (ref 70–99)
HCT VFR BLD AUTO: 36.5 % (ref 42–52)
HGB BLD-MCNC: 12.4 G/DL (ref 14–18)
IMM GRANULOCYTES # BLD: 0 K/UL
IRON SATN MFR SERPL: 42 % (ref 20–50)
IRON SERPL-MCNC: 100 UG/DL (ref 59–158)
LYMPHOCYTES # BLD: 0.8 K/UL (ref 1.1–4.5)
LYMPHOCYTES NFR BLD: 40 % (ref 20–40)
MACROCYTES BLD QL SMEAR: ABNORMAL
MCH RBC QN AUTO: 33.8 PG (ref 27–31)
MCHC RBC AUTO-ENTMCNC: 34 G/DL (ref 33–37)
MCV RBC AUTO: 99.5 FL (ref 80–94)
MONOCYTES # BLD: 0.8 K/UL (ref 0–0.9)
MONOCYTES NFR BLD: 38 % (ref 0–10)
NEUTROPHILS # BLD: 0.4 K/UL (ref 1.5–7.5)
NEUTS BAND NFR BLD MANUAL: 2 % (ref 0–5)
NEUTS SEG NFR BLD: 16 % (ref 50–65)
OVALOCYTES BLD QL SMEAR: ABNORMAL
PERFORMED ON: ABNORMAL
PLATELET # BLD AUTO: 114 K/UL (ref 130–400)
PLATELET SLIDE REVIEW: ABNORMAL
PMV BLD AUTO: 9.7 FL (ref 9.4–12.4)
POTASSIUM SERPL-SCNC: 4.1 MMOL/L (ref 3.5–5)
PROT SERPL-MCNC: 5.3 G/DL (ref 6.4–8.3)
RBC # BLD AUTO: 3.67 M/UL (ref 4.7–6.1)
SODIUM SERPL-SCNC: 140 MMOL/L (ref 136–145)
TIBC SERPL-MCNC: 240 UG/DL (ref 250–400)
VIT B12 SERPL-MCNC: 2165 PG/ML (ref 232–1245)
WBC # BLD AUTO: 2 K/UL (ref 4.8–10.8)

## 2025-07-15 PROCEDURE — 82746 ASSAY OF FOLIC ACID SERUM: CPT

## 2025-07-15 PROCEDURE — 93010 ELECTROCARDIOGRAM REPORT: CPT | Performed by: INTERNAL MEDICINE

## 2025-07-15 PROCEDURE — 82962 GLUCOSE BLOOD TEST: CPT

## 2025-07-15 PROCEDURE — 82607 VITAMIN B-12: CPT

## 2025-07-15 PROCEDURE — 6370000000 HC RX 637 (ALT 250 FOR IP): Performed by: NURSE PRACTITIONER

## 2025-07-15 PROCEDURE — 80053 COMPREHEN METABOLIC PANEL: CPT

## 2025-07-15 PROCEDURE — 85025 COMPLETE CBC W/AUTO DIFF WBC: CPT

## 2025-07-15 PROCEDURE — 93005 ELECTROCARDIOGRAM TRACING: CPT | Performed by: NURSE PRACTITIONER

## 2025-07-15 PROCEDURE — 36415 COLL VENOUS BLD VENIPUNCTURE: CPT

## 2025-07-15 PROCEDURE — 83550 IRON BINDING TEST: CPT

## 2025-07-15 PROCEDURE — 1200000000 HC SEMI PRIVATE

## 2025-07-15 PROCEDURE — 6360000002 HC RX W HCPCS: Performed by: NURSE PRACTITIONER

## 2025-07-15 PROCEDURE — 2500000003 HC RX 250 WO HCPCS: Performed by: NURSE PRACTITIONER

## 2025-07-15 PROCEDURE — 83540 ASSAY OF IRON: CPT

## 2025-07-15 PROCEDURE — 94760 N-INVAS EAR/PLS OXIMETRY 1: CPT

## 2025-07-15 PROCEDURE — 2580000003 HC RX 258: Performed by: NURSE PRACTITIONER

## 2025-07-15 RX ADMIN — GABAPENTIN 300 MG: 300 CAPSULE ORAL at 14:02

## 2025-07-15 RX ADMIN — CEFEPIME 2000 MG: 2 INJECTION, POWDER, FOR SOLUTION INTRAVENOUS at 14:03

## 2025-07-15 RX ADMIN — GABAPENTIN 300 MG: 300 CAPSULE ORAL at 21:17

## 2025-07-15 RX ADMIN — CETIRIZINE HYDROCHLORIDE 10 MG: 10 TABLET, FILM COATED ORAL at 08:58

## 2025-07-15 RX ADMIN — APIXABAN 5 MG: 5 TABLET, FILM COATED ORAL at 09:04

## 2025-07-15 RX ADMIN — ATORVASTATIN CALCIUM 80 MG: 80 TABLET, FILM COATED ORAL at 21:17

## 2025-07-15 RX ADMIN — CARIPRAZINE 1.5 MG: 1.5 CAPSULE, GELATIN COATED ORAL at 08:58

## 2025-07-15 RX ADMIN — APIXABAN 5 MG: 5 TABLET, FILM COATED ORAL at 21:17

## 2025-07-15 RX ADMIN — SODIUM CHLORIDE, PRESERVATIVE FREE 10 ML: 5 INJECTION INTRAVENOUS at 08:57

## 2025-07-15 RX ADMIN — LISINOPRIL 10 MG: 10 TABLET ORAL at 08:58

## 2025-07-15 RX ADMIN — GABAPENTIN 300 MG: 300 CAPSULE ORAL at 08:58

## 2025-07-15 RX ADMIN — TROSPIUM CHLORIDE 20 MG: 20 TABLET, FILM COATED ORAL at 21:17

## 2025-07-15 RX ADMIN — SODIUM CHLORIDE, PRESERVATIVE FREE 10 ML: 5 INJECTION INTRAVENOUS at 21:17

## 2025-07-15 RX ADMIN — PANTOPRAZOLE SODIUM 40 MG: 40 TABLET, DELAYED RELEASE ORAL at 08:58

## 2025-07-15 NOTE — PROGRESS NOTES
Pharmacy Adjustment per [facility] protocol    Go Rajan is a 79 y.o. male. Pharmacy has adjusted medications per [facility] protocol.    Recent Labs     07/14/25  0330 07/15/25  0124   BUN 20 17       Recent Labs     07/14/25  0330 07/15/25  0124   CREATININE 1.1 1.3*       Estimated Creatinine Clearance: 58 mL/min (A) (based on SCr of 1.3 mg/dL (H)).    Height:   Ht Readings from Last 1 Encounters:   07/14/25 1.854 m (6' 1\")     Weight:  Wt Readings from Last 1 Encounters:   07/14/25 99.8 kg (220 lb)    BMI:  BMI Readings from Last 1 Encounters:   07/14/25 29.03 kg/m²         Plan: Adjust the following medications based on [facility] protocol:           Cefepime to 2000 mg IV every 24 hours extended infusion over 240 minutes     Electronically signed by Grzegorz Li RPH on 7/15/2025 at 1:40 PM

## 2025-07-15 NOTE — PROGRESS NOTES
[facility] Hospitalists      Progress Note    Patient:  Go Rajan  YOB: 1945  Date of Service: 7/15/2025  MRN: [id number]   Acct: [id number]   Primary Care Physician: Maria Luisa Jeffers APRN  Advance Directive: Full Code  Admit Date: 7/14/2025       Hospital Day: 1    Portions of this note have been copied forward, however, updated to reflect the most current clinical status of this patient.     CHIEF COMPLAINT Abd pain    SUBJECTIVE:  Denies any abd pain today.  Just ate breakfast      CUMULATIVE HOSPITAL COURSE:   Go Rajan is an 79 y.o. male with PMH listed below.  Pt presented to the ED with complaint of chills, fever and suprapubic abd pain.  States he felt like he had a UTI.   Symptoms presented 3 days ago.  ER eval.  Na 138, k 4.1, Bun 20, creat 1.1, lactic3.6, 2.2 repeat 2.0. glucose 163.  WBC 2.p Hgb 14.8, hct 42.7, plt 150.  Urine unremrkable.  CT abd  unremarkable for acute findings.  CXR Nl.         Objective:   VITALS:  BP (!) 133/54   Pulse 65   Temp 97 °F (36.1 °C) (Temporal)   Resp 18   Ht 1.854 m (6' 1\")   Wt 99.8 kg (220 lb)   SpO2 98%   BMI 29.03 kg/m²   24HR INTAKE/OUTPUT:    Intake/Output Summary (Last 24 hours) at 7/15/2025 1326  Last data filed at 7/15/2025 1022  Gross per 24 hour   Intake 525.59 ml   Output 1350 ml   Net -824.41 ml           Physical Exam  Vitals and nursing note reviewed.   Constitutional:       Appearance: He is obese.   HENT:      Head: Normocephalic.      Nose: Nose normal.      Mouth/Throat:      Mouth: Mucous membranes are moist.   Eyes:      Extraocular Movements: Extraocular movements intact.   Cardiovascular:      Rate and Rhythm: Normal rate and regular rhythm.      Pulses: Normal pulses.      Heart sounds: Normal heart sounds.   Pulmonary:      Effort: Pulmonary effort is normal.      Breath sounds: Normal breath sounds.   Abdominal:      General: Bowel sounds are normal.      Palpations: Abdomen is soft.      Comments: Obese and non tender    Musculoskeletal:         General: Normal range of motion.      Cervical back: Neck supple.   Skin:     General: Skin is warm and dry.   Neurological:      General: No focal deficit present.      Mental Status: He is alert.   Psychiatric:         Mood and Affect: Mood normal.            Medications:      sodium chloride      dextrose        atorvastatin  80 mg Oral Nightly    cetirizine  10 mg Oral Daily    sodium chloride flush  5-40 mL IntraVENous 2 times per day    insulin lispro  0-4 Units SubCUTAneous 4x Daily AC & HS    pantoprazole  40 mg Oral Daily    trospium  20 mg Oral Nightly    lisinopril  10 mg Oral Daily    apixaban  5 mg Oral BID    gabapentin  300 mg Oral TID    cariprazine hcl  1.5 mg Oral Daily     acetaminophen, sodium chloride flush, sodium chloride, potassium chloride, magnesium sulfate, polyethylene glycol, [DISCONTINUED] acetaminophen **OR** acetaminophen, ondansetron **OR** ondansetron, glucose, dextrose bolus **OR** dextrose bolus, glucagon (rDNA), dextrose, clonazePAM  ADULT DIET; Regular; 4 carb choices (60 gm/meal)     Lab and other Data:     Recent Labs     07/14/25  0330 07/15/25  0123   WBC 2.9* 2.0*   HGB 14.8 12.4*    114*     Recent Labs     07/14/25  0330 07/15/25  0124    140   K 4.1 4.1    107   CO2 21* 22   BUN 20 17   CREATININE 1.1 1.3*   GLUCOSE 163* 129*     Recent Labs     07/14/25  0330 07/15/25  0124   AST 65* 64*   ALT 72* 63*   BILITOT 0.5 0.3   ALKPHOS 55 41     Troponin T: No results for input(s): \"TROPONINI\" in the last 72 hours.  Pro-BNP: No results for input(s): \"BNP\" in the last 72 hours.  INR: No results for input(s): \"INR\" in the last 72 hours.  UA:  Recent Labs     07/14/25  0430   COLORU YELLOW   PHUR 6.5   WBCUA 1   RBCUA 2   BACTERIA Negative   CLARITYU Clear   LEUKOCYTESUR TRACE*   UROBILINOGEN 1.0   BILIRUBINUR Negative   BLOODU Negative   GLUCOSEU Negative     A1C: No results for input(s): \"LABA1C\" in the last 72 hours.  ABG:No

## 2025-07-16 VITALS
WEIGHT: 220.46 LBS | HEIGHT: 73 IN | RESPIRATION RATE: 16 BRPM | DIASTOLIC BLOOD PRESSURE: 54 MMHG | OXYGEN SATURATION: 94 % | BODY MASS INDEX: 29.22 KG/M2 | TEMPERATURE: 98.1 F | HEART RATE: 57 BPM | SYSTOLIC BLOOD PRESSURE: 130 MMHG

## 2025-07-16 LAB
ALBUMIN SERPL-MCNC: 3.7 G/DL (ref 3.5–5.2)
ALP SERPL-CCNC: 41 U/L (ref 40–129)
ALT SERPL-CCNC: 65 U/L (ref 10–50)
ANION GAP SERPL CALCULATED.3IONS-SCNC: 11 MMOL/L (ref 8–16)
AST SERPL-CCNC: 66 U/L (ref 10–50)
BACTERIA UR CULT: NORMAL
BASOPHILS # BLD: 0 K/UL (ref 0–0.2)
BASOPHILS NFR BLD: 0 % (ref 0–1)
BILIRUB SERPL-MCNC: 0.2 MG/DL (ref 0.2–1.2)
BUN SERPL-MCNC: 16 MG/DL (ref 8–23)
CALCIUM SERPL-MCNC: 8.3 MG/DL (ref 8.8–10.2)
CHLORIDE SERPL-SCNC: 110 MMOL/L (ref 98–107)
CO2 SERPL-SCNC: 22 MMOL/L (ref 22–29)
CREAT SERPL-MCNC: 1.1 MG/DL (ref 0.7–1.2)
EKG P AXIS: 66 DEGREES
EKG P-R INTERVAL: 200 MS
EKG Q-T INTERVAL: 438 MS
EKG QRS DURATION: 88 MS
EKG QTC CALCULATION (BAZETT): 435 MS
EKG T AXIS: 48 DEGREES
EOSINOPHIL # BLD: 0 K/UL (ref 0–0.6)
EOSINOPHIL NFR BLD: 0 % (ref 0–5)
ERYTHROCYTE [DISTWIDTH] IN BLOOD BY AUTOMATED COUNT: 12.9 % (ref 11.5–14.5)
GLUCOSE BLD-MCNC: 120 MG/DL (ref 70–99)
GLUCOSE BLD-MCNC: 128 MG/DL (ref 70–99)
GLUCOSE SERPL-MCNC: 140 MG/DL (ref 70–99)
HCT VFR BLD AUTO: 36.5 % (ref 42–52)
HGB BLD-MCNC: 12.3 G/DL (ref 14–18)
IMM GRANULOCYTES # BLD: 0 K/UL
LYMPHOCYTES # BLD: 0.9 K/UL (ref 1.1–4.5)
LYMPHOCYTES NFR BLD: 44 % (ref 20–40)
MACROCYTES BLD QL SMEAR: ABNORMAL
MCH RBC QN AUTO: 34.5 PG (ref 27–31)
MCHC RBC AUTO-ENTMCNC: 33.7 G/DL (ref 33–37)
MCV RBC AUTO: 102.2 FL (ref 80–94)
MONOCYTES # BLD: 0.2 K/UL (ref 0–0.9)
MONOCYTES NFR BLD: 8 % (ref 0–10)
NEUTROPHILS # BLD: 0.9 K/UL (ref 1.5–7.5)
NEUTS SEG NFR BLD: 46 % (ref 50–65)
OVALOCYTES BLD QL SMEAR: ABNORMAL
PERFORMED ON: ABNORMAL
PERFORMED ON: ABNORMAL
PLATELET # BLD AUTO: 105 K/UL (ref 130–400)
PLATELET SLIDE REVIEW: ABNORMAL
PMV BLD AUTO: 9.8 FL (ref 9.4–12.4)
POTASSIUM SERPL-SCNC: 4.4 MMOL/L (ref 3.5–5)
PROT SERPL-MCNC: 5.3 G/DL (ref 6.4–8.3)
RBC # BLD AUTO: 3.57 M/UL (ref 4.7–6.1)
SODIUM SERPL-SCNC: 143 MMOL/L (ref 136–145)
VARIANT LYMPHS NFR BLD: 2 % (ref 0–8)
WBC # BLD AUTO: 2 K/UL (ref 4.8–10.8)

## 2025-07-16 PROCEDURE — 93010 ELECTROCARDIOGRAM REPORT: CPT | Performed by: INTERNAL MEDICINE

## 2025-07-16 PROCEDURE — 6360000002 HC RX W HCPCS: Performed by: NURSE PRACTITIONER

## 2025-07-16 PROCEDURE — 82962 GLUCOSE BLOOD TEST: CPT

## 2025-07-16 PROCEDURE — 97165 OT EVAL LOW COMPLEX 30 MIN: CPT

## 2025-07-16 PROCEDURE — 85025 COMPLETE CBC W/AUTO DIFF WBC: CPT

## 2025-07-16 PROCEDURE — 36415 COLL VENOUS BLD VENIPUNCTURE: CPT

## 2025-07-16 PROCEDURE — 80053 COMPREHEN METABOLIC PANEL: CPT

## 2025-07-16 PROCEDURE — 97530 THERAPEUTIC ACTIVITIES: CPT

## 2025-07-16 PROCEDURE — 2500000003 HC RX 250 WO HCPCS: Performed by: NURSE PRACTITIONER

## 2025-07-16 PROCEDURE — 6370000000 HC RX 637 (ALT 250 FOR IP): Performed by: NURSE PRACTITIONER

## 2025-07-16 PROCEDURE — 2580000003 HC RX 258: Performed by: NURSE PRACTITIONER

## 2025-07-16 PROCEDURE — 94760 N-INVAS EAR/PLS OXIMETRY 1: CPT

## 2025-07-16 RX ORDER — CETIRIZINE HYDROCHLORIDE 10 MG/1
10 TABLET ORAL DAILY
Qty: 30 TABLET | Refills: 0 | Status: SHIPPED
Start: 2025-07-16 | End: 2025-08-15

## 2025-07-16 RX ADMIN — SODIUM CHLORIDE: 0.9 INJECTION, SOLUTION INTRAVENOUS at 02:59

## 2025-07-16 RX ADMIN — PANTOPRAZOLE SODIUM 40 MG: 40 TABLET, DELAYED RELEASE ORAL at 08:52

## 2025-07-16 RX ADMIN — GABAPENTIN 300 MG: 300 CAPSULE ORAL at 08:52

## 2025-07-16 RX ADMIN — APIXABAN 5 MG: 5 TABLET, FILM COATED ORAL at 08:52

## 2025-07-16 RX ADMIN — CARIPRAZINE 1.5 MG: 1.5 CAPSULE, GELATIN COATED ORAL at 08:52

## 2025-07-16 RX ADMIN — CEFEPIME 2000 MG: 2 INJECTION, POWDER, FOR SOLUTION INTRAVENOUS at 06:47

## 2025-07-16 RX ADMIN — CETIRIZINE HYDROCHLORIDE 10 MG: 10 TABLET, FILM COATED ORAL at 08:52

## 2025-07-16 RX ADMIN — SODIUM CHLORIDE, PRESERVATIVE FREE 10 ML: 5 INJECTION INTRAVENOUS at 08:52

## 2025-07-16 RX ADMIN — LISINOPRIL 10 MG: 10 TABLET ORAL at 08:52

## 2025-07-16 NOTE — PROGRESS NOTES
Spiritual Health History and Assessment/Progress Note  [facility]    (P) Advance Care Planning,  ,  ,      Name: Go Rajan MRN: [id number]    Age: 79 y.o.     Sex: male   Language: English   Confucianist: Presbyterian   Abdominal pain     Date: 7/16/2025            Total Time Calculated: (P) 30 min              Spiritual Assessment began in [facility] 4 ONCOLOGY UNIT        Referral/Consult From: (P) Rounding   Encounter Overview/Reason: (P) Advance Care Planning  Service Provided For: (P) Patient    Sandra, Belief, Meaning:   Patient identifies as spiritual and is connected with a sandra tradition or spiritual practice  Family/Friends No family/friends present      Importance and Influence:  Patient has spiritual/personal beliefs that influence decisions regarding their health  Family/Friends No family/friends present    Community:  Patient is connected with a spiritual community  Family/Friends No family/friends present    Assessment and Plan of Care:   Mr. Rajan is a Adventist with good family support. 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 He is grateful and hopeful.  Patient Interventions include: Facilitated expression of thoughts and feelings, Explored spiritual coping/struggle/distress, Affirmed coping skills/support systems, and Assisted in Advance Care Planning conversation  Family/Friends Interventions include: No family/friends present    Patient Plan of Care: No future visits per patient/family request  Family/Friends Plan of Care: No family/friends present    Electronically signed by Chaplain Kumar Mai on 7/16/2025 at 5:17 PM

## 2025-07-16 NOTE — PROGRESS NOTES
Physical Therapy    Pt awaiting d/c home. Denies need for therapy eval at this time. Seen by OT, found to be SB-IND with mobility.    Electronically signed by Joanna Moore PT on 7/16/2025 at 1:29 PM

## 2025-07-16 NOTE — ACP (ADVANCE CARE PLANNING)
Advance Care Planning     Advance Care Planning Inpatient Note  [facility]    Today's Date: 7/16/2025  Unit: L 4 ONCOLOGY UNIT    Received request from rounding.  Upon review of chart and communication with care team, patient's decision making abilities are not in question.. Patient was/were present in the room during visit.    Goals of ACP Conversation:  Facilitate a discussion related to patient's goals of care as they align with the patient's values and beliefs.    Health Care Decision Makers:       Primary Decision Maker: Mary Carmen Rajan - [facility] - [phone]  Summary:  Verified Healthcare Decision Maker     Advance Care Planning Documents (Patient Wishes):  None     Assessment:  Mr. Rajan named his wife as PDM.    Interventions:  Encouraged ongoing ACP conversation with future decision makers and loved ones    Care Preferences Communicated:   No    Outcomes/Plan:  Pt named PDM    Electronically signed by Chaplain Kumar Mai on 7/16/2025 at 5:20 PM

## 2025-07-16 NOTE — DISCHARGE INSTRUCTIONS
Fu with PCP  Cultures negative  Good nutrition  Moniter bood sugar  Increase activity as tolerated

## 2025-07-16 NOTE — PROGRESS NOTES
Pharmacy Renal Adjustment    Go Rajan is a 79 y.o. male. Pharmacy has renally adjusted medications per protocol.    Recent Labs     07/15/25  0124 07/16/25  0154   BUN 17 16       Recent Labs     07/15/25  0124 07/16/25  0154   CREATININE 1.3* 1.1       Estimated Creatinine Clearance: 69 mL/min (based on SCr of 1.1 mg/dL).    Height:   Ht Readings from Last 1 Encounters:   07/14/25 1.854 m (6' 1\")     Weight:  Wt Readings from Last 1 Encounters:   07/14/25 99.8 kg (220 lb)       Plan: Adjust the following medications based on renal function:           Cefepime to 2000 mg IV every 12 hours extended infusion over 240 minutes     Electronically signed by BARNEY KAUR RPH on 7/16/2025 at 5:32 AM

## 2025-07-16 NOTE — PROGRESS NOTES
Occupational Therapy  Facility/Department: [facility] 4 ONCOLOGY UNIT  Occupational Therapy Initial Assessment    Name: Go Rajan  : 1945  MRN: [id number]  Date of Service: 2025    Discharge Recommendations:  Home with assist PRN          Patient Diagnosis(es): The primary encounter diagnosis was Sepsis without acute organ dysfunction, due to unspecified organism (HCC). A diagnosis of Generalized abdominal pain was also pertinent to this visit.  Past Medical History:  has a past medical history of Bladder cancer (HCC), Diabetes mellitus (HCC), Elevated PSA, Hypertension, Kidney stone, Lymphoma (HCC), and Prostate cancer (HCC).  Past Surgical History:  has a past surgical history that includes joint replacement (); shoulder surgery (); Appendectomy; Vasectomy (); laparotomy; Cystoscopy (N/A, 04/15/2021); and Abdominal hernia repair.    Treatment Diagnosis: abdominal pain      Assessment  Assessment: Evaluation completed and tx initiated.  All education completed this visit.  No barriers to stated discharge plan identified.  Treatment Diagnosis: abdominal pain  REQUIRES OT FOLLOW-UP: No  Activity Tolerance  Activity Tolerance: Patient Tolerated treatment well     Plan  Occupational Therapy Plan  Additional Comments: No further therapy visits planned    Restrictions  Restrictions/Precautions  Restrictions/Precautions: Fall Risk    Subjective  General  Chart Reviewed: Yes  Patient assessed for rehabilitation services?: Yes  Family / Caregiver Present: No  Diagnosis: Abdominal pain  Subjective  Subjective: Pt in recliner and agreeable to participate in therapy     Social/Functional History  Social/Functional History  Lives With: Spouse  Type of Home: House  Home Layout: One level (2 steps down to den)  Home Access: Stairs to enter without rails  Entrance Stairs - Number of Steps: 1  Bathroom Shower/Tub: Tub/Shower unit, Shower chair with back  Bathroom Equipment: Grab bars in shower  Home Equipment:  Cane, Walker - Rolling  Has the patient had two or more falls in the past year or any fall with injury in the past year?: No  Receives Help From: Family  Prior Level of Assist for ADLs: Independent  Prior Level of Assist for Homemaking: Independent  Homemaking Responsibilities: Yes  Prior Level of Assist for Ambulation: Independent household ambulator, with or without device  Prior Level of Assist for Transfers: Independent  Active : No    Objective  Temp: 98.1 °F (36.7 °C)  Pulse: 57  Heart Rate Source: Monitor  SpO2: 94 %  O2 Device: None (Room air)  BP: (!) 130/54  MAP (Calculated): 79  BP Location: Right upper arm  Vision  Vision: Impaired  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  Hearing Exceptions: Bilateral hearing aid;Hard of hearing/hearing concerns       Observation/Palpation  Observation: telemetry  Safety Devices  Type of Devices: Call light within reach;Left in bed     Toilet Transfers  Toilet - Technique: Ambulating  Toilet Transfer: Stand by assistance  Toilet Transfers Comments: no device; per clinical observation     ADL  Feeding: Independent  Grooming: Independent;Setup  UE Bathing: Independent;Setup  LE Bathing: Stand by assistance  UE Dressing: Independent;Setup  LE Dressing: Stand by assistance  Putting On/Taking Off Footwear: Stand by assistance  Toileting: Stand by assistance           Transfers  Stand Step Transfers: Stand by assistance  Sit to stand: Stand by assistance  Stand to sit: Stand by assistance  Transfer Comments: no device     Cognition  Overall Cognitive Status: WFL  Cognition Comment: Awake, alert, follows simple commands  Orientation  Overall Orientation Status: Within Functional Limits    Education Given To: Patient  Education Provided: Role of Therapy;Plan of Care  Education Method: Verbal  Barriers to Learning: None  Education Outcome: Verbalized understanding  LUE AROM (degrees)  LUE AROM : WFL  RUE AROM (degrees)  RUE AROM : WFL

## 2025-07-16 NOTE — DISCHARGE SUMMARY
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 *      Significant Diagnostic Studies:   CT ABDOMEN PELVIS W IV CONTRAST Additional Contrast? None  Result Date: 2025  EXAM:  CT SCAN ABDOMEN PELVIS WITH CONTRAST  HISTORY:  Abdominal pain  COMPARISON:  CT scan abdomen pelvis 10/05/2022  FINDINGS:  Postcontrast helical imaging was obtained through the abdomen pelvis utilizing 5-mm collimation. The.  Sagittal and coronal reconstructions were imaged and reviewed..  The visualized lung bases are clear.  There is a small hiatal hernia.  The gallbladder is fluid filled without cholelithiasis.. The liver, pancreas and adrenal glands have normal enhanced CT appearance.  There is a stable 10 mm hypoattenuating lesion within the superior aspect of the spleen which contains scattered calcification.  Mild atherosclerotic changes are seen involving the aorta without aneurysm formation.  The kidneys excrete contrast in a normal fashion bilaterally..  There is diverticulosis without diverticulitis  Normal bladder. There is a periumbilical hernia containing incarcerated small bowel..  Prostate gland is normal in size and contains calcification and clips Postoperative changes within the left inguinal region. Avascular necrosis involving both femoral heads.     Hiatal hernia.  Stable benign hypoattenuating lesion in the spleen.  Diverticulosis  creat 1.1, lactic3.6, 2.2 repeat 2.0. glucose 163. WBC 2.p Hgb 14.8, hct 42.7, plt 150. Urine unremrkable. CT abd unremarkable for acute findings. CXR Nl. He had no further abd pain.His urine and blood cultures showed no growth.  He is uriniating without difficulty.  He is ready for discharge.  There was some concern from family about palpitations.  He will dc on zio    Physical Exam:  Vital Signs: BP (!) 130/54   Pulse 57   Temp 98.1 °F (36.7 °C) (Temporal)   Resp 16   Ht 1.854 m (6' 1\")   Wt 100 kg (220 lb 7.4 oz)   SpO2 94%   BMI 29.09 kg/m²   General appearance:.Alert and Cooperative   HEENT: Normocephalic.  Chest: clear to auscultation bilaterally without wheezes or rhonchi.  Cardiac: Normal heart tones with regular rate and rhythm, S1, S2 normal. No murmurs, gallops, or rubs auscultated.   Abdomen:soft, non-tender; normal bowel sounds, no masses, no organomegaly.  Extremities: No clubbing or cyanosis. No peripheral edema. Peripheral pulses palpable.  Neurologic: Grossly intact.    Discharge Medications:         Medication List        CONTINUE taking these medications      BD Pen Needle Suzie U/F 32G X 4 MM Misc  Generic drug: Insulin Pen Needle            ASK your doctor about these medications      acetaminophen 325 MG tablet  Commonly known as: TYLENOL     Actemra ACTPen 162 MG/0.9ML Soaj injection  Generic drug: tocilizumab     alendronate 70 MG Tbef  Ask about: Which instructions should I use?     Eliquis 5 MG Tabs tablet  Generic drug: apixaban     gabapentin 300 MG capsule  Commonly known as: NEURONTIN     glipiZIDE 5 MG tablet  Commonly known as: GLUCOTROL     glucose 4 g chewable tablet     KlonoPIN 0.5 MG tablet  Generic drug: clonazePAM     Lipitor 80 MG tablet  Generic drug: atorvastatin     * lisinopril 20 MG tablet  Commonly known as: PRINIVIL;ZESTRIL     * lisinopril 10 MG tablet  Commonly known as: PRINIVIL;ZESTRIL     meclizine 25 MG tablet  Commonly known as: ANTIVERT     pantoprazole 40

## 2025-07-19 LAB
BACTERIA BLD CULT ORG #2: NORMAL
BACTERIA BLD CULT: NORMAL

## 2025-07-21 DIAGNOSIS — C61 PROSTATE CANCER (HCC): ICD-10-CM

## 2025-07-21 LAB — PSA SERPL-MCNC: 0.02 NG/ML (ref 0–4)

## 2025-07-28 ENCOUNTER — PROCEDURE VISIT (OUTPATIENT)
Dept: UROLOGY | Age: 80
End: 2025-07-28
Payer: MEDICARE

## 2025-07-28 VITALS — TEMPERATURE: 98.1 F | BODY MASS INDEX: 31.07 KG/M2 | HEIGHT: 73 IN | WEIGHT: 234.4 LBS

## 2025-07-28 DIAGNOSIS — R39.15 URGENCY OF URINATION: ICD-10-CM

## 2025-07-28 DIAGNOSIS — C61 PROSTATE CANCER (HCC): Primary | ICD-10-CM

## 2025-07-28 DIAGNOSIS — Z85.51 HISTORY OF BLADDER CANCER: ICD-10-CM

## 2025-07-28 PROCEDURE — G8427 DOCREV CUR MEDS BY ELIG CLIN: HCPCS | Performed by: UROLOGY

## 2025-07-28 PROCEDURE — 52000 CYSTOURETHROSCOPY: CPT | Performed by: UROLOGY

## 2025-07-28 PROCEDURE — G8417 CALC BMI ABV UP PARAM F/U: HCPCS | Performed by: UROLOGY

## 2025-07-28 PROCEDURE — 81002 URINALYSIS NONAUTO W/O SCOPE: CPT | Performed by: UROLOGY

## 2025-07-28 PROCEDURE — 1123F ACP DISCUSS/DSCN MKR DOCD: CPT | Performed by: UROLOGY

## 2025-07-28 PROCEDURE — 1036F TOBACCO NON-USER: CPT | Performed by: UROLOGY

## 2025-07-28 PROCEDURE — 99214 OFFICE O/P EST MOD 30 MIN: CPT | Performed by: UROLOGY

## 2025-07-28 PROCEDURE — 1111F DSCHRG MED/CURRENT MED MERGE: CPT | Performed by: UROLOGY

## 2025-07-28 PROCEDURE — 1159F MED LIST DOCD IN RCRD: CPT | Performed by: UROLOGY

## 2025-07-28 RX ORDER — HYDROCODONE BITARTRATE AND ACETAMINOPHEN 5; 325 MG/1; MG/1
1 TABLET ORAL EVERY 6 HOURS PRN
COMMUNITY
Start: 2025-06-16

## 2025-07-28 RX ORDER — OMEGA-3-ACID ETHYL ESTERS 1 G/1
CAPSULE, LIQUID FILLED ORAL
COMMUNITY
Start: 2025-06-03

## 2025-07-28 RX ORDER — DICYCLOMINE HYDROCHLORIDE 10 MG/1
CAPSULE ORAL
COMMUNITY
Start: 2025-04-29

## 2025-07-28 RX ORDER — FERROUS SULFATE 325(65) MG
1 TABLET ORAL DAILY
COMMUNITY
Start: 2025-06-30

## 2025-07-28 RX ORDER — DEXTROMETHORPHAN HYDROBROMIDE, BUPROPION HYDROCHLORIDE 105; 45 MG/1; MG/1
1 TABLET, MULTILAYER, EXTENDED RELEASE ORAL EVERY MORNING
COMMUNITY
Start: 2025-07-22

## 2025-07-28 RX ORDER — DESVENLAFAXINE 50 MG/1
50 TABLET, FILM COATED, EXTENDED RELEASE ORAL DAILY
COMMUNITY
Start: 2025-05-27

## 2025-07-28 NOTE — PROGRESS NOTES
Go Rajan is a 79 y.o. male who presents today   Chief Complaint   Patient presents with    Cystoscopy     I am here today for my Cystoscopy.      Prostate Cancer  Patient is here today for prostate cancer which was first diagnosed 5 years ago.  His prostate cancer can be characterized clinical stage T2b [facility] 3+4 = 7 grade group 2  Very low PSA after neoadjuvant hormonal therapy and radiation  His last several PSA values are as follows:  Lab Results   Component Value Date    PSA 0.02 07/21/2025    PSA <0.01 07/24/2024    PSA <0.01 09/06/2023     Previous treatment of prostate cancer: Neoadjuvant chemotherapy x 6 months given April 2020  External beam ration therapy completed 9/10/2020  Lower urinary tract symptoms: urgency which he now is taking Gemtesa he feels like this helps him.  He recently was hospitalized.  He says he just feels weak does not have any energy.  Urine culture during the hospitalization grew skin sinan.  UA today is negative        BLADDER CANCER  Patient was first diagnosed with bladder cancer approximately 4 years(s) ago.  Initial diagnosis was April 2021  Last Recurrence: He has not had a recurrence since his initial diagnosis on 4/16/2021  Stage of bladder cancer at last recurrence: 8130/2 - Papillary transitional cell carcinoma, non-invasive, stage TA  Grade: low grade, low risk  Last urinary cytology/FISH results: negative; Date: 12/26/2024  Hematuria?  None  Last upper tract study: 9 month(s) ago.  Study was a CT done 11/14/2024         Cystoscopy Operative Note (7/28/25)  Surgeon: Denys Costa MD  Anesthesia: Urethral 2% Xylocaine   Indications: History of bladder cancer  Position: Supine    Findings:   The patient was prepped and draped in the usual sterile fashion.  The flexible cystoscope was advanced through the urethra and into the bladder under direct vision.  The bladder was thoroughly inspected and the following was noted:    Residual Urine: mild  Urethra: stenotic

## 2025-08-07 ENCOUNTER — TELEPHONE (OUTPATIENT)
Dept: UROLOGY | Age: 80
End: 2025-08-07

## 2025-08-11 ENCOUNTER — OFFICE VISIT (OUTPATIENT)
Dept: UROLOGY | Age: 80
End: 2025-08-11
Payer: MEDICARE

## 2025-08-11 VITALS — WEIGHT: 228.2 LBS | HEIGHT: 73 IN | TEMPERATURE: 97.9 F | BODY MASS INDEX: 30.24 KG/M2

## 2025-08-11 DIAGNOSIS — R30.0 DYSURIA: Primary | ICD-10-CM

## 2025-08-11 DIAGNOSIS — N39.46 MIXED STRESS AND URGE URINARY INCONTINENCE: ICD-10-CM

## 2025-08-11 DIAGNOSIS — R39.15 URGENCY OF URINATION: ICD-10-CM

## 2025-08-11 DIAGNOSIS — C61 PROSTATE CANCER (HCC): ICD-10-CM

## 2025-08-11 DIAGNOSIS — Z85.51 HISTORY OF BLADDER CANCER: ICD-10-CM

## 2025-08-11 LAB
BACTERIA URINE, POC: 0
BILIRUBIN URINE: 1 MG/DL
BLOOD, URINE: NEGATIVE
CASTS URINE, POC: 0
CLARITY, UA: CLEAR
COLOR, UA: YELLOW
CRYSTALS URINE, POC: 0
EPI CELLS URINE, POC: ABNORMAL
GLUCOSE URINE: ABNORMAL
KETONES, URINE: POSITIVE
LEUKOCYTE EST, POC: ABNORMAL
NITRITE, URINE: NEGATIVE
PH UA: 5.5 (ref 4.5–8)
PROTEIN UA: POSITIVE
RBC URINE, POC: 0
SPECIFIC GRAVITY UA: 1.02 (ref 1–1.03)
UROBILINOGEN, URINE: ABNORMAL
WBC URINE, POC: 2
YEAST URINE, POC: 0

## 2025-08-11 PROCEDURE — G8427 DOCREV CUR MEDS BY ELIG CLIN: HCPCS | Performed by: NURSE PRACTITIONER

## 2025-08-11 PROCEDURE — 1036F TOBACCO NON-USER: CPT | Performed by: NURSE PRACTITIONER

## 2025-08-11 PROCEDURE — 1159F MED LIST DOCD IN RCRD: CPT | Performed by: NURSE PRACTITIONER

## 2025-08-11 PROCEDURE — 81001 URINALYSIS AUTO W/SCOPE: CPT | Performed by: NURSE PRACTITIONER

## 2025-08-11 PROCEDURE — 99214 OFFICE O/P EST MOD 30 MIN: CPT | Performed by: NURSE PRACTITIONER

## 2025-08-11 PROCEDURE — 1123F ACP DISCUSS/DSCN MKR DOCD: CPT | Performed by: NURSE PRACTITIONER

## 2025-08-11 PROCEDURE — 1111F DSCHRG MED/CURRENT MED MERGE: CPT | Performed by: NURSE PRACTITIONER

## 2025-08-11 PROCEDURE — G8417 CALC BMI ABV UP PARAM F/U: HCPCS | Performed by: NURSE PRACTITIONER

## 2025-08-11 RX ORDER — BUSPIRONE HYDROCHLORIDE 5 MG/1
5 TABLET ORAL 3 TIMES DAILY
COMMUNITY

## 2025-08-11 RX ORDER — ALBUTEROL SULFATE 90 UG/1
2 INHALANT RESPIRATORY (INHALATION) EVERY 6 HOURS PRN
COMMUNITY

## 2025-08-11 ASSESSMENT — ENCOUNTER SYMPTOMS
VOMITING: 0
NAUSEA: 0
ABDOMINAL PAIN: 0
ABDOMINAL DISTENTION: 0
BACK PAIN: 0

## 2025-08-11 NOTE — PROGRESS NOTES
Physician Progress Note      PATIENT:               DINH SEWELL  CSN #:                  [id number]  :                       1945  ADMIT DATE:       2025 4:18 AM  DISCH DATE:        2025 3:16 PM  RESPONDING  PROVIDER #:        ILSA HERRERA          QUERY TEXT:    Abdominal Pain is documented in the Discharge Summary by Ilsa Herrera APRN - CNP.  Please document the most likely cause:    The clinical indicators include:  H&P by Ilsa Herrera APRN - CNP at 2025  \"complaint of chills, fever and suprapubic abd pain.  States he felt like he   had a UTI.   Symptoms presented 3 days ago.  ER eval.  Na 138, k 4.1, Bun 20,   creat 1.1, lactic3.6, 2.2 repeat 2.0. glucose 163.  WBC 2.p Hgb 14.8, hct   42.7, plt 150.  Urine unremrkable.  CT abd  unremarkable for acute findings\"    Progress Notes by Ilsa Herrera APRN - CNP at 7/15/2025  \"Active Problems:  Abd pain  Consider CTA abd if further abd pain given hx of giant cell arteritis\"    CT abdomen/pelvis   Hiatal hernia.  Stable benign hypoattenuating lesion in the spleen.  Diverticulosis without diverticulitis  Small periumbilical hernia containing non incarcerated small bowel    Treatment: IV ceFEPIme (MAXIPIME) 2,000 mg, IV infusion, pantoprazole   (PROTONIX)  Options provided:  -- Abdominal Pain related to Prostate Cancer  -- Abdominal Pain related to Follicular Lymphoma of intraabdominal lymph   nodes.  -- Abdominal Pain related to giant cell arteritis  -- Other - I will add my own diagnosis  -- Disagree - Not applicable / Not valid  -- Disagree - Clinically unable to determine / Unknown  -- Refer to Clinical Documentation Reviewer    PROVIDER RESPONSE TEXT:    This patient has abdominal pain related to Follicular Lymphoma of   intraabdominal lymph nodes.    Query created by: Melinda Gibbons on 2025 1:29 PM      Electronically signed by:  ILSA HERRERA 2025 9:48 AM

## 2025-08-13 DIAGNOSIS — N30.00 ACUTE CYSTITIS WITHOUT HEMATURIA: Primary | ICD-10-CM

## 2025-08-13 LAB
BACTERIA UR CULT: ABNORMAL
BACTERIA UR CULT: ABNORMAL
ORGANISM: ABNORMAL

## 2025-08-13 RX ORDER — AMOXICILLIN 500 MG/1
500 CAPSULE ORAL 3 TIMES DAILY
Qty: 21 CAPSULE | Refills: 0 | Status: SHIPPED | OUTPATIENT
Start: 2025-08-13 | End: 2025-08-20

## (undated) DEVICE — STERILE LATEX POWDER FREE SURGICAL GLOVES WITH HYDROGEL COATING: Brand: PROTEXIS

## (undated) DEVICE — ELECTRODE LOOP 24FR WIRE DIA0.35MM YEL CUT ANG 1 STEM W/

## (undated) DEVICE — CATHETER URET 5FR L70CM OPN END SGL LUMN INJ HUB FLEXIMA

## (undated) DEVICE — Z DISCONTINUED BY MEDLINE USE 2733855 TRAY SKIN SCRB VAG PVP-I

## (undated) DEVICE — BAG URIN DRNAGE 2000ML TB L48IN NDL SAMP ANTIREFLX CHMBR DRN

## (undated) DEVICE — BAG DRNGE COMB PK

## (undated) DEVICE — DRAINBAG,ANTI-REFLUX TOWER,L/F,2000ML,LL: Brand: MEDLINE

## (undated) DEVICE — LARYNGOSCOPE VID MILLER 2 MTL BLADE M HNDL CURAPLEX

## (undated) DEVICE — CONTRAST IOTHALAMATE MEGLUMINE 60% 50 ML INJ CONRAY 60

## (undated) DEVICE — GLOVE SURG SZ 75 CRM LTX FREE POLYISOPRENE POLYMER BEAD ANTI

## (undated) DEVICE — DOVER HYDROGEL COATED LATEX FOLEY CATHETER, 5 ML, 3-WAY 22 FR/CH (7.3 MM): Brand: DOVER

## (undated) DEVICE — SURGICAL PROCEDURE PACK CYTOSCOPY

## (undated) DEVICE — Z INACTIVE USE 2660664 SOLUTION IRRIG 3000ML 0.9% SOD CHL USP UROMATIC PLAS CONT

## (undated) DEVICE — GUIDEWIRE VASC L145CM DIA0.035IN TIP L4CM PTFE S STL SHT

## (undated) DEVICE — TUBE ET 7.5MM NSL ORAL BASIC CUF INTMED MURPHY EYE RADPQ

## (undated) DEVICE — Z INACTIVE USE 2635503 SOLUTION IRRIG 3000ML ST H2O USP UROMATIC PLAS CONT